# Patient Record
Sex: FEMALE | Race: BLACK OR AFRICAN AMERICAN | NOT HISPANIC OR LATINO | Employment: UNEMPLOYED | URBAN - METROPOLITAN AREA
[De-identification: names, ages, dates, MRNs, and addresses within clinical notes are randomized per-mention and may not be internally consistent; named-entity substitution may affect disease eponyms.]

---

## 2017-06-10 ENCOUNTER — HOSPITAL ENCOUNTER (EMERGENCY)
Facility: HOSPITAL | Age: 22
Discharge: HOME | End: 2017-06-10
Attending: EMERGENCY MEDICINE
Payer: MEDICAID

## 2017-06-10 VITALS
OXYGEN SATURATION: 96 % | DIASTOLIC BLOOD PRESSURE: 71 MMHG | BODY MASS INDEX: 42.74 KG/M2 | SYSTOLIC BLOOD PRESSURE: 135 MMHG | RESPIRATION RATE: 20 BRPM | HEART RATE: 101 BPM | HEIGHT: 68 IN | WEIGHT: 282 LBS | TEMPERATURE: 99 F

## 2017-06-10 DIAGNOSIS — B34.9 VIRAL ILLNESS: Primary | ICD-10-CM

## 2017-06-10 LAB
B-HCG UR QL: NEGATIVE
BILIRUB UR QL STRIP: NEGATIVE
CLARITY UR REFRACT.AUTO: CLEAR
COLOR UR AUTO: YELLOW
GLUCOSE UR QL STRIP: NEGATIVE
HGB UR QL STRIP: NEGATIVE
KETONES UR QL STRIP: NEGATIVE
LEUKOCYTE ESTERASE UR QL STRIP: NEGATIVE
NITRITE UR QL STRIP: NEGATIVE
PH UR STRIP: 6 [PH] (ref 5–8)
PROT UR QL STRIP: NEGATIVE
SP GR UR STRIP: >=1.03 (ref 1–1.03)
URN SPEC COLLECT METH UR: ABNORMAL
UROBILINOGEN UR STRIP-ACNC: NEGATIVE EU/DL

## 2017-06-10 PROCEDURE — 99283 EMERGENCY DEPT VISIT LOW MDM: CPT

## 2017-06-10 PROCEDURE — 81003 URINALYSIS AUTO W/O SCOPE: CPT

## 2017-06-10 PROCEDURE — 81025 URINE PREGNANCY TEST: CPT

## 2017-06-12 NOTE — ED PROVIDER NOTES
"Encounter Date: 6/10/2017       History     Chief Complaint   Patient presents with    Dizziness     Pt states, " Starting yesterday, I have been feeling dizzy and lightheaded with sharp pains going through my stomach and side." Pt ambulatory in triage without difficulty, AAOx4.      Review of patient's allergies indicates:   Allergen Reactions    Penicillins      Patient currently presents with complaint of generalized fatigue.  Patient notes that she was having waves of nausea and abdominal cramping that have been intermittent over the past 48 hours.  She denies fever or chills.  There has been no vomiting or diarrhea.  Patient notes that she was feeling weak yesterday but this has improved.  Patient denies excessive vaginal bleeding.  There has been no blood in the stools.  She denies urinary symptoms.          Past Medical History:   Diagnosis Date    Anxiety      Past Surgical History:   Procedure Laterality Date     SECTION       History reviewed. No pertinent family history.  Social History   Substance Use Topics    Smoking status: Never Smoker    Smokeless tobacco: Never Used    Alcohol use No     Review of Systems   Constitutional: Negative for chills and fever.   HENT: Negative for congestion and rhinorrhea.    Eyes: Negative for visual disturbance.   Respiratory: Negative for cough, chest tightness, shortness of breath and wheezing.    Cardiovascular: Negative for chest pain, palpitations and leg swelling.   Gastrointestinal: Positive for nausea. Negative for abdominal distention, abdominal pain, blood in stool, constipation, diarrhea and vomiting.   Genitourinary: Negative for difficulty urinating, dysuria, frequency, urgency, vaginal bleeding and vaginal discharge.   Skin: Negative for color change and rash.   Allergic/Immunologic: Negative for immunocompromised state.   Neurological: Negative for weakness, numbness and headaches.   Hematological: Negative for adenopathy. Does not " bruise/bleed easily.   All other systems reviewed and are negative.      Physical Exam     Initial Vitals [06/10/17 2014]   BP Pulse Resp Temp SpO2   (!) 141/90 108 20 99 °F (37.2 °C) 96 %     Physical Exam    Nursing note and vitals reviewed.  Constitutional: She appears well-developed and well-nourished. She is not diaphoretic. No distress.   Patient currently appears comfortable; smiling; laughing; browsing through her phone during the encounter.   HENT:   Head: Normocephalic and atraumatic.   Right Ear: External ear normal.   Left Ear: External ear normal.   Nose: Nose normal.   Mouth/Throat: Oropharynx is clear and moist.   Eyes: Conjunctivae and EOM are normal. Pupils are equal, round, and reactive to light. No scleral icterus.   Neck: Neck supple. No JVD present.   Cardiovascular: Normal rate, regular rhythm, normal heart sounds and intact distal pulses. Exam reveals no gallop and no friction rub.    No murmur heard.  Pulmonary/Chest: Breath sounds normal. No respiratory distress. She has no wheezes. She has no rhonchi. She has no rales.   Abdominal: Soft. Bowel sounds are normal. She exhibits no distension. There is no tenderness.   Musculoskeletal: Normal range of motion.   Lymphadenopathy:     She has no cervical adenopathy.   Neurological: She is alert and oriented to person, place, and time. No cranial nerve deficit.   Skin: Skin is warm and dry. No rash noted.   Psychiatric: She has a normal mood and affect. Her behavior is normal.         ED Course   Procedures  Labs Reviewed   URINALYSIS - Abnormal; Notable for the following:        Result Value    Specific Gravity, UA >=1.030 (*)     All other components within normal limits   PREGNANCY TEST, URINE RAPID         Based on vital signs taken here in the emergency room today, the patient was counseled regarding an elevated blood pressure concerning for pre-hypertension/hypertension.  Accordingly the patient has been advised to follow with the primary  care physician for reassessment and management as needed.  Ihsan Pritchard MD  4:51 AM       Medical Decision Making:   ED Management:  All findings were reviewed with the patient/family in detail.  At present I see no evidence of an emergency condition.  Patient appears to be recovering from possible viral illness.  All remaining questions and concerns were addressed at that time.  Patient has been counseled regarding the need for follow-up as well as the indication to return to the emergency room should new or worrisome developments occur.  Ihsan Pritchard MD  4:54 AM                     ED Course     Clinical Impression:   The encounter diagnosis was Viral illness.          Ihsan Pritchard MD  06/12/17 0454

## 2018-03-20 ENCOUNTER — HOSPITAL ENCOUNTER (EMERGENCY)
Facility: HOSPITAL | Age: 23
Discharge: HOME | End: 2018-03-20
Payer: MEDICAID

## 2018-03-20 VITALS
OXYGEN SATURATION: 100 % | DIASTOLIC BLOOD PRESSURE: 81 MMHG | TEMPERATURE: 100 F | SYSTOLIC BLOOD PRESSURE: 136 MMHG | BODY MASS INDEX: 46.22 KG/M2 | RESPIRATION RATE: 16 BRPM | HEART RATE: 93 BPM | WEIGHT: 293 LBS

## 2018-03-20 DIAGNOSIS — R53.83 FATIGUE, UNSPECIFIED TYPE: ICD-10-CM

## 2018-03-20 DIAGNOSIS — R03.0 ELEVATED BLOOD PRESSURE READING: ICD-10-CM

## 2018-03-20 DIAGNOSIS — R68.83 CHILLS: Primary | ICD-10-CM

## 2018-03-20 LAB
B-HCG UR QL: NEGATIVE
BILIRUB UR QL STRIP: NEGATIVE
CLARITY UR REFRACT.AUTO: ABNORMAL
COLOR UR AUTO: YELLOW
GLUCOSE UR QL STRIP: NEGATIVE
HGB UR QL STRIP: NEGATIVE
KETONES UR QL STRIP: NEGATIVE
LEUKOCYTE ESTERASE UR QL STRIP: NEGATIVE
NITRITE UR QL STRIP: NEGATIVE
PH UR STRIP: 6 [PH] (ref 5–8)
PROT UR QL STRIP: NEGATIVE
SP GR UR STRIP: 1.02 (ref 1–1.03)
URN SPEC COLLECT METH UR: ABNORMAL
UROBILINOGEN UR STRIP-ACNC: NEGATIVE EU/DL

## 2018-03-20 PROCEDURE — 81003 URINALYSIS AUTO W/O SCOPE: CPT

## 2018-03-20 PROCEDURE — 99283 EMERGENCY DEPT VISIT LOW MDM: CPT

## 2018-03-20 PROCEDURE — 81025 URINE PREGNANCY TEST: CPT

## 2018-03-20 RX ORDER — ONDANSETRON 4 MG/1
4 TABLET, ORALLY DISINTEGRATING ORAL EVERY 6 HOURS PRN
Qty: 10 TABLET | Refills: 0 | Status: SHIPPED | OUTPATIENT
Start: 2018-03-20 | End: 2018-03-25

## 2018-03-20 NOTE — ED PROVIDER NOTES
History      Chief Complaint   Patient presents with    Fatigue     chills and fatigue approx 1.5 weeks       Review of patient's allergies indicates:   Allergen Reactions    Penicillins         HPI   HPI    3/20/2018, 12:22 PM   History obtained from the patient      History of Present Illness: Lise Qureshi is a 22 y.o. female patient who presents to the Emergency Department for intermittent subj fever, tiredness for a week, since starting a new job with 12 hour shifts.  She denies any cough and cold symptoms, abd pain, vomiting, dysuria.  She admits to a few episodes of diarrhea and nausea. Symptoms are moderate in severity.     No further complaints or concerns at this time.           PCP: Sheron Sousa RN       Past Medical History:  Past Medical History:   Diagnosis Date    Anxiety          Past Surgical History:  Past Surgical History:   Procedure Laterality Date     SECTION             Family History:  History reviewed. No pertinent family history.        Social History:  Social History     Social History Main Topics    Smoking status: Never Smoker    Smokeless tobacco: Never Used    Alcohol use No    Drug use: No    Sexual activity: Not on file       ROS     Review of Systems   Constitutional: Positive for chills and fatigue.   HENT: Negative for rhinorrhea and sore throat.    Respiratory: Negative for cough and shortness of breath.    Cardiovascular: Negative for chest pain.   Gastrointestinal: Positive for diarrhea and nausea. Negative for abdominal pain and vomiting.   Endocrine: Negative for polydipsia and polyuria.   Genitourinary: Negative for dysuria, flank pain, pelvic pain and vaginal discharge.   Musculoskeletal: Negative for back pain, neck pain and neck stiffness.   Skin: Negative for color change and rash.   Neurological: Negative for weakness and headaches.   Hematological: Does not bruise/bleed easily.   All other systems reviewed and are  negative.      Physical Exam      Initial Vitals [03/20/18 1212]   BP Pulse Resp Temp SpO2   136/81 93 16 99.5 °F (37.5 °C) 100 %      MAP       99.33         Physical Exam  Vital signs and nursing notes reviewed.  Constitutional: Patient is in NAD. Awake and alert. Well-developed and well-nourished.  Head: Atraumatic. Normocephalic.  Eyes: PERRL. EOM intact. Conjunctivae nl. No scleral icterus.  ENT: Mucous membranes are moist. Oropharynx is clear.  Neck: Supple. No JVD. No lymphadenopathy.  No meningismus  Cardiovascular: Regular rate and rhythm. No murmurs, rubs, or gallops. Distal pulses are 2+ and symmetric.  Pulmonary/Chest: No respiratory distress. Clear to auscultation bilaterally. No wheezing, rales, or rhonchi.  Abdominal: Soft. Non-distended. No TTP. No rebound, guarding, or rigidity. Good bowel sounds.  Genitourinary: No CVA tenderness  Musculoskeletal: Moves all extremities. No edema.   Skin: Warm and dry.  Neurological: Awake and alert. No acute focal neurological deficits are appreciated.  Psychiatric: Normal affect. Good eye contact. Appropriate in content.      ED Course          Procedures  ED Vital Signs:  Vitals:    03/20/18 1212   BP: 136/81   Pulse: 93   Resp: 16   Temp: 99.5 °F (37.5 °C)   TempSrc: Oral   SpO2: 100%   Weight: (!) 137.9 kg (304 lb)         Results for orders placed or performed during the hospital encounter of 03/20/18   Urinalysis   Result Value Ref Range    Specimen UA Urine, Clean Catch     Color, UA Yellow Yellow, Straw, Katherine    Appearance, UA Hazy (A) Clear    pH, UA 6.0 5.0 - 8.0    Specific Gravity, UA 1.025 1.005 - 1.030    Protein, UA Negative Negative    Glucose, UA Negative Negative    Ketones, UA Negative Negative    Bilirubin (UA) Negative Negative    Occult Blood UA Negative Negative    Nitrite, UA Negative Negative    Urobilinogen, UA Negative <2.0 EU/dL    Leukocytes, UA Negative Negative   Pregnancy, urine rapid   Result Value Ref Range    Preg Test, Ur  Negative              Imaging Results:  Imaging Results    None            The Emergency Provider reviewed the vital signs and test results, which are outlined above.    ED Discussion             Medication(s) given in the ER:  Medications - No data to display        Follow-up Information     [facility] [place] in 2 days.    Contact information:  [id number] [facility]  [place] [id number]  [phone]                       New Prescriptions    ONDANSETRON (ZOFRAN-ODT) 4 MG TBDL    Take 1 tablet (4 mg total) by mouth every 6 (six) hours as needed (nausea/vomiting).          Medical Decision Making      Pre-hypertension/Hypertension: The pt has been informed that they may have pre-hypertension or hypertension based on a blood pressure reading in the ED. I recommend that the pt call the PCP listed on their discharge instructions or a physician of their choice this week to arrange f/u for further evaluation of possible pre-hypertension or hypertension.       All findings were reviewed with the patient/family in detail.   All remaining questions and concerns were addressed at that time.  Patient/family has been counseled regarding the need for follow-up as well as the indication to return to the emergency room should new or worrisome developments occur.        MDM   Additional MDM:   Hypertension: The patient has hypertension (no treatment required at this time). The patient's condition was felt to be stable.              Clinical Impression:        ICD-10-CM ICD-9-CM   1. Chills R68.83 780.64   2. Elevated blood pressure reading R03.0 796.2   3. Fatigue, unspecified type R53.83 780.79             Poonam Nixon PA-C  03/20/18 0205

## 2018-03-20 NOTE — ED NOTES
Level of Consciousness: The patient is awake, alert, and oriented with appropriate affect and speech; oriented to person, place and time.  Skin: Skin is warm and dry with good skin turgor; intact; color consistent with ethnicity.  Mucous membranes are moist.   Musculoskeletal: Moves all extremities well in full range of motion. No obvious deformities or swelling noted.  Respiratory: Airway open and patent, respirations spontaneous, even and unlabored. No accessory muscles in use. Breath sounds clear.  Cardiac: Regular rate and rhythm, no peripheral edema noted, good pulses palpated peripherally, capillary refill < 3 seconds.  Abdomen: Soft, non-tender to palpation. No distention noted.  Neurologic: PERRLA, face exhibits symmetrical expression, hand grasps equal and even bilaterally, reports normal sensation to all extremities and face.  Reports generalized weakness and chills. Ambulates with strong, steady gait. Denies pain, SOB, N/V/D.

## 2018-03-25 ENCOUNTER — HOSPITAL ENCOUNTER (EMERGENCY)
Facility: HOSPITAL | Age: 23
Discharge: HOME | End: 2018-03-25
Attending: EMERGENCY MEDICINE
Payer: MEDICAID

## 2018-03-25 VITALS
HEIGHT: 68 IN | BODY MASS INDEX: 44.41 KG/M2 | HEART RATE: 97 BPM | OXYGEN SATURATION: 100 % | WEIGHT: 293 LBS | DIASTOLIC BLOOD PRESSURE: 76 MMHG | TEMPERATURE: 98 F | RESPIRATION RATE: 20 BRPM | SYSTOLIC BLOOD PRESSURE: 119 MMHG

## 2018-03-25 DIAGNOSIS — L50.0 ALLERGIC URTICARIA: Primary | ICD-10-CM

## 2018-03-25 PROCEDURE — 96372 THER/PROPH/DIAG INJ SC/IM: CPT

## 2018-03-25 PROCEDURE — 63600175 PHARM REV CODE 636 W HCPCS: Performed by: EMERGENCY MEDICINE

## 2018-03-25 PROCEDURE — 99283 EMERGENCY DEPT VISIT LOW MDM: CPT | Mod: 25

## 2018-03-25 PROCEDURE — 25000003 PHARM REV CODE 250: Performed by: EMERGENCY MEDICINE

## 2018-03-25 RX ORDER — DIPHENHYDRAMINE HCL 25 MG
50 CAPSULE ORAL
Status: COMPLETED | OUTPATIENT
Start: 2018-03-25 | End: 2018-03-25

## 2018-03-25 RX ORDER — DEXAMETHASONE SODIUM PHOSPHATE 4 MG/ML
8 INJECTION, SOLUTION INTRA-ARTICULAR; INTRALESIONAL; INTRAMUSCULAR; INTRAVENOUS; SOFT TISSUE
Status: COMPLETED | OUTPATIENT
Start: 2018-03-25 | End: 2018-03-25

## 2018-03-25 RX ADMIN — DEXAMETHASONE SODIUM PHOSPHATE 8 MG: 4 INJECTION, SOLUTION INTRAMUSCULAR; INTRAVENOUS at 12:03

## 2018-03-25 RX ADMIN — DIPHENHYDRAMINE HYDROCHLORIDE 50 MG: 25 CAPSULE ORAL at 12:03

## 2018-03-25 NOTE — ED PROVIDER NOTES
Encounter Date: 3/25/2018       History     Chief Complaint   Patient presents with    Allergic Reaction     Patient reports that she ate crawfish around 5 pm and approx 30 mins qgo she noticed hives. hives noted to arms, neck and left cheek. Denies feeling short of breath.     Patient currently presents with concerns regarding rash.  Onset noted a few hours ago.  Process is generalized.  Rash is described as hives.  There is not associated fever.  Patient/family reports associated itching.  There is not a recent history of new medications or foods but the patient does note crawfish intake prior to the onset of symptoms.  This has not occurred previously.           Review of patient's allergies indicates:   Allergen Reactions    Penicillins      Past Medical History:   Diagnosis Date    Anxiety      Past Surgical History:   Procedure Laterality Date     SECTION       History reviewed. No pertinent family history.  Social History   Substance Use Topics    Smoking status: Never Smoker    Smokeless tobacco: Never Used    Alcohol use No     Review of Systems   Constitutional: Negative for chills and fever.   HENT: Negative for congestion and rhinorrhea.    Respiratory: Negative for cough, chest tightness, shortness of breath and wheezing.    Cardiovascular: Negative for chest pain, palpitations and leg swelling.   Gastrointestinal: Negative for abdominal pain, constipation, diarrhea, nausea and vomiting.   Genitourinary: Negative for dysuria, frequency, urgency, vaginal bleeding and vaginal discharge.   Skin: Negative for color change and rash.   Allergic/Immunologic: Negative for immunocompromised state.   Neurological: Negative for dizziness, weakness and numbness.   Hematological: Negative for adenopathy. Does not bruise/bleed easily.   All other systems reviewed and are negative.      Physical Exam     Initial Vitals [18 0010]   BP Pulse Resp Temp SpO2   119/76 97 20 98.3 °F (36.8 °C) 100 %       MAP       90.33             Physical Exam    Nursing note and vitals reviewed.  Constitutional: She appears well-developed and well-nourished. She is not diaphoretic. No distress.   HENT:   Head: Normocephalic and atraumatic.   Right Ear: External ear normal.   Left Ear: External ear normal.   Nose: Nose normal.   Mouth/Throat: Oropharynx is clear and moist.   Eyes: Conjunctivae and EOM are normal. Pupils are equal, round, and reactive to light. No scleral icterus.   Neck: Neck supple. No tracheal deviation present. No JVD present.   Cardiovascular: Normal rate, regular rhythm, normal heart sounds and intact distal pulses. Exam reveals no gallop and no friction rub.    No murmur heard.  Pulmonary/Chest: Breath sounds normal. No respiratory distress. She has no wheezes. She has no rhonchi. She has no rales.   Abdominal: Soft. Bowel sounds are normal. She exhibits no distension. There is no tenderness.   Musculoskeletal: Normal range of motion. She exhibits no edema.   Neurological: She is alert and oriented to person, place, and time. She has normal strength. No cranial nerve deficit or sensory deficit.   Skin: Skin is warm and dry. Rash noted. Rash is urticarial.   Psychiatric: She has a normal mood and affect. Her behavior is normal.         ED Course   Procedures  Labs Reviewed - No data to display          Medical Decision Making:   ED Management:  All findings were reviewed with the patient/family in detail along with the diagnosis of allergic urticaria.  I see no indication of an emergent process beyond that addressed during our encounter but have duly counseled the patient/family regarding the need for prompt follow-up as well as the indications that should prompt immediate return to the emergency room should new or worrisome developments occur.  The patient/family communicates understanding of all this information and all remaining questions and concerns were addressed at this time.                           Clinical Impression:   The encounter diagnosis was Allergic urticaria.                           Ihsan Pritchard MD  03/25/18 0032

## 2018-07-13 ENCOUNTER — HOSPITAL ENCOUNTER (EMERGENCY)
Facility: HOSPITAL | Age: 23
Discharge: HOME | End: 2018-07-13
Attending: EMERGENCY MEDICINE
Payer: MEDICAID

## 2018-07-13 VITALS
DIASTOLIC BLOOD PRESSURE: 86 MMHG | TEMPERATURE: 99 F | RESPIRATION RATE: 18 BRPM | SYSTOLIC BLOOD PRESSURE: 138 MMHG | HEART RATE: 95 BPM | BODY MASS INDEX: 44.41 KG/M2 | WEIGHT: 293 LBS | OXYGEN SATURATION: 100 % | HEIGHT: 68 IN
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
## 2018-07-13 DIAGNOSIS — R03.0 ELEVATED BLOOD PRESSURE READING WITHOUT DIAGNOSIS OF HYPERTENSION: ICD-10-CM

## 2018-07-13 DIAGNOSIS — S09.90XA INJURY OF HEAD, INITIAL ENCOUNTER: ICD-10-CM

## 2018-07-13 DIAGNOSIS — S00.03XA CONTUSION OF SCALP, INITIAL ENCOUNTER: ICD-10-CM

## 2018-07-13 LAB — B-HCG UR QL: NEGATIVE

## 2018-07-13 PROCEDURE — 99284 EMERGENCY DEPT VISIT MOD MDM: CPT | Mod: 25

## 2018-07-13 PROCEDURE — 81025 URINE PREGNANCY TEST: CPT

## 2018-07-13 NOTE — ED PROVIDER NOTES
Encounter Date: 2018       History     Chief Complaint   Patient presents with    Fall     fell and hit head approx 30 min PTA, +nausea and dizziness, denies LOC, pt reports tripped and hit right head near temple      She was walking at home when she tripped over a chair in the kitchen and fell forward, striking the left forehead against the corner of an iron stove as she fell, ultimately landing on the floor on her left shoulder.  No loss of consciousness or laceration.  Light is bothering her eyes and she has minimal nausea.  No neck injury, back pain, or other significant localizing complaints. Denies change in level of consciousness, amnesia, or other localizing neurologic complaint.  Does not believe that she would be pregnant currently.  Did not drive here.  Concerned about the possibility of intracranial injury. No other complaints.      The history is provided by the patient. No  was used.     Review of patient's allergies indicates:   Allergen Reactions    Penicillins      Past Medical History:   Diagnosis Date    Anxiety      Past Surgical History:   Procedure Laterality Date     SECTION       History reviewed. No pertinent family history.  Social History   Substance Use Topics    Smoking status: Current Some Day Smoker    Smokeless tobacco: Never Used    Alcohol use No     Review of Systems   Constitutional: Negative for activity change, fatigue and fever.   HENT: Negative for congestion, ear pain, facial swelling, nosebleeds, sinus pressure and sore throat.    Eyes: Negative for pain, discharge, redness and visual disturbance.   Respiratory: Negative for cough, choking, chest tightness, shortness of breath and wheezing.    Cardiovascular: Negative for chest pain, palpitations and leg swelling.   Gastrointestinal: Negative for abdominal distention, abdominal pain, nausea and vomiting.   Endocrine: Negative for heat intolerance, polydipsia and polyuria.    Genitourinary: Negative for difficulty urinating, dysuria, flank pain, hematuria and urgency.   Musculoskeletal: Negative for back pain, gait problem, joint swelling and myalgias.   Skin: Negative for color change and rash.   Allergic/Immunologic: Negative for environmental allergies and food allergies.   Neurological: Positive for headaches. Negative for dizziness, weakness and numbness.   Hematological: Negative for adenopathy. Does not bruise/bleed easily.   Psychiatric/Behavioral: Negative for agitation and behavioral problems. The patient is not nervous/anxious.    All other systems reviewed and are negative.      Physical Exam     Initial Vitals [07/13/18 1724]   BP Pulse Resp Temp SpO2   (!) 170/98 (!) 122 20 98.6 °F (37 °C) 99 %      MAP       --         Physical Exam    Nursing note and vitals reviewed.  Constitutional: She appears well-developed and well-nourished. She is not diaphoretic. No distress.   HENT:   Head: Normocephalic.   Mouth/Throat: No oropharyngeal exudate.   2.5 cm circular raised tender swelling over the left frontal forehead region without crepitus or laceration.  No other findings.   Eyes: Conjunctivae and EOM are normal. Pupils are equal, round, and reactive to light. Right eye exhibits no discharge. Left eye exhibits no discharge. No scleral icterus.   Neck: Normal range of motion. Neck supple. No thyromegaly present. No tracheal deviation present. No JVD present.   Cardiovascular: Normal rate, regular rhythm, normal heart sounds and intact distal pulses. Exam reveals no gallop and no friction rub.    No murmur heard.  Pulmonary/Chest: Breath sounds normal. No stridor. No respiratory distress. She has no wheezes. She has no rhonchi. She has no rales. She exhibits no tenderness.   Abdominal: Soft. Bowel sounds are normal. She exhibits no distension and no mass. There is no tenderness. There is no rebound and no guarding.   Musculoskeletal: Normal range of motion. She exhibits no  edema or tenderness.   Neurological: She is alert and oriented to person, place, and time. She has normal strength.   Normal exam   Skin: Skin is warm and dry. No rash and no abscess noted. No erythema.   Psychiatric: She has a normal mood and affect. Her behavior is normal. Judgment and thought content normal.         ED Course   Procedures  Labs Reviewed   PREGNANCY TEST, URINE RAPID          Imaging Results          CT Head Without Contrast (Final result)  Result time 07/13/18 18:32:09    Final result by Margarito Torres Jr., MD (07/13/18 18:32:09)                 Impression:      1. No acute intracranial findings.  All CT scans at this facility are performed  using dose modulation techniques as appropriate to performed exam including the following:  automated exposure control; adjustment of mA and/or kV according to the patients size (this includes techniques or standardized protocols for targeted exams where dose is matched to indication/reason for exam: i.e. extremities or head);  iterative reconstruction technique.      Electronically signed by: Margarito Torres Jr., MD  Date:    07/13/2018  Time:    18:32             Narrative:    EXAMINATION:  CT HEAD WITHOUT CONTRAST    CLINICAL HISTORY:  Head trauma, minor, GCS>=13, NOC/NEXUS/CCR neg, first study;    TECHNIQUE:  CT scan was obtained of the head without administration of contrast.    COMPARISON:  None    FINDINGS:  Ventricles and basal cisterns are normal.  No hemorrhage, mass effect or midline shift.  No cerebral or cerebellar parenchymal abnormality.  Paranasal sinuses are clear.  Mastoid air cells are clear.  Calvarium is intact.                                                      Clinical Impression:     1. Fall from standing, initial encounter    2. Injury of head, initial encounter    3. Contusion of scalp, initial encounter    4. Elevated blood pressure reading without diagnosis of hypertension            Disposition:   Condition:  Stable                        Eyal Hinkle MD  07/17/18 6842

## 2018-09-06 ENCOUNTER — HOSPITAL ENCOUNTER (EMERGENCY)
Facility: HOSPITAL | Age: 23
Discharge: HOME | End: 2018-09-06
Attending: EMERGENCY MEDICINE
Payer: MEDICAID

## 2018-09-06 VITALS
HEIGHT: 68 IN | SYSTOLIC BLOOD PRESSURE: 148 MMHG | BODY MASS INDEX: 43.65 KG/M2 | TEMPERATURE: 98 F | OXYGEN SATURATION: 99 % | HEART RATE: 100 BPM | DIASTOLIC BLOOD PRESSURE: 87 MMHG | WEIGHT: 288 LBS | RESPIRATION RATE: 20 BRPM

## 2018-09-06 DIAGNOSIS — R10.2 PELVIC CRAMPING: Primary | ICD-10-CM

## 2018-09-06 LAB — B-HCG UR QL: NEGATIVE

## 2018-09-06 PROCEDURE — 81025 URINE PREGNANCY TEST: CPT

## 2018-09-06 PROCEDURE — 25000003 PHARM REV CODE 250: Performed by: NURSE PRACTITIONER

## 2018-09-06 PROCEDURE — 99283 EMERGENCY DEPT VISIT LOW MDM: CPT

## 2018-09-06 RX ORDER — IBUPROFEN 600 MG/1
600 TABLET ORAL
Status: COMPLETED | OUTPATIENT
Start: 2018-09-06 | End: 2018-09-06

## 2018-09-06 RX ORDER — IBUPROFEN 600 MG/1
600 TABLET ORAL EVERY 8 HOURS PRN
Qty: 20 TABLET | Refills: 0 | Status: SHIPPED | OUTPATIENT
Start: 2018-09-06 | End: 2018-12-27

## 2018-09-06 RX ADMIN — IBUPROFEN 600 MG: 600 TABLET ORAL at 01:09

## 2018-09-06 NOTE — ED PROVIDER NOTES
Encounter Date: 2018       History     Chief Complaint   Patient presents with    Dysmenorrhea     c/o pelvic pain and nausea for the last 3 days following the start of her cycle      22 year old female with complaint of lower pelvic cramping X 3 days.  Pt reports that is started her menses 3 days ago.  Reports light bleeding at present.  Reports no pain radiation.  NO trial of over the counter pain medication.  Reports mild nausea.  NO alleviating factors.           Review of patient's allergies indicates:   Allergen Reactions    Penicillins      Past Medical History:   Diagnosis Date    Anxiety      Past Surgical History:   Procedure Laterality Date     SECTION       History reviewed. No pertinent family history.  Social History     Tobacco Use    Smoking status: Current Some Day Smoker    Smokeless tobacco: Never Used   Substance Use Topics    Alcohol use: No    Drug use: No     Review of Systems   Constitutional: Negative for fever.   HENT: Negative for sore throat.    Respiratory: Negative for shortness of breath.    Cardiovascular: Negative for chest pain.   Gastrointestinal: Negative for nausea.   Genitourinary: Positive for pelvic pain. Negative for dysuria.   Musculoskeletal: Negative for back pain.   Skin: Negative for rash.   Neurological: Negative for weakness.   Hematological: Does not bruise/bleed easily.       Physical Exam     Initial Vitals [18 1315]   BP Pulse Resp Temp SpO2   (!) 148/87 100 20 98.2 °F (36.8 °C) 99 %      MAP       --         Physical Exam    Nursing note and vitals reviewed.  Constitutional: She appears well-developed and well-nourished.   HENT:   Head: Normocephalic and atraumatic.   Eyes: Conjunctivae and EOM are normal. Pupils are equal, round, and reactive to light.   Neck: Normal range of motion. Neck supple.   Cardiovascular: Normal rate, regular rhythm, normal heart sounds and intact distal pulses.   Pulmonary/Chest: Breath sounds normal.    Abdominal: Soft. There is no tenderness. There is no rebound and no guarding.   Musculoskeletal: Normal range of motion.   Neurological: She is alert and oriented to person, place, and time. She has normal strength and normal reflexes.   Skin: Skin is warm and dry.   Psychiatric: She has a normal mood and affect. Her behavior is normal. Thought content normal.         ED Course   Procedures  Labs Reviewed   PREGNANCY TEST, URINE RAPID          Imaging Results    None           Labs Reviewed   PREGNANCY TEST, URINE RAPID     3:05 PM  Pt reports feeling better                       Clinical Impression:   The encounter diagnosis was Pelvic cramping.                             Jefferson Castro NP  09/06/18 [id number]

## 2018-12-27 ENCOUNTER — HOSPITAL ENCOUNTER (EMERGENCY)
Facility: HOSPITAL | Age: 23
Discharge: HOME | End: 2018-12-27
Attending: EMERGENCY MEDICINE
Payer: MEDICAID

## 2018-12-27 VITALS
TEMPERATURE: 99 F | OXYGEN SATURATION: 99 % | SYSTOLIC BLOOD PRESSURE: 168 MMHG | WEIGHT: 293 LBS | RESPIRATION RATE: 18 BRPM | DIASTOLIC BLOOD PRESSURE: 90 MMHG | HEART RATE: 96 BPM | BODY MASS INDEX: 45.59 KG/M2

## 2018-12-27 DIAGNOSIS — M25.572 LEFT ANKLE PAIN: ICD-10-CM

## 2018-12-27 DIAGNOSIS — S93.402A MILD SPRAIN OF LEFT ANKLE, INITIAL ENCOUNTER: Primary | ICD-10-CM

## 2018-12-27 DIAGNOSIS — R03.0 ELEVATED BLOOD PRESSURE READING WITHOUT DIAGNOSIS OF HYPERTENSION: ICD-10-CM

## 2018-12-27 PROCEDURE — 25000003 PHARM REV CODE 250: Performed by: NURSE PRACTITIONER

## 2018-12-27 PROCEDURE — 99283 EMERGENCY DEPT VISIT LOW MDM: CPT

## 2018-12-27 RX ORDER — NAPROXEN 500 MG/1
500 TABLET ORAL 2 TIMES DAILY PRN
Qty: 30 TABLET | Refills: 0 | Status: SHIPPED | OUTPATIENT
Start: 2018-12-27 | End: 2020-03-01

## 2018-12-27 RX ORDER — ACETAMINOPHEN 500 MG
1000 TABLET ORAL
Status: COMPLETED | OUTPATIENT
Start: 2018-12-27 | End: 2018-12-27

## 2018-12-27 RX ADMIN — ACETAMINOPHEN 1000 MG: 500 TABLET ORAL at 03:12

## 2018-12-27 NOTE — ED PROVIDER NOTES
Encounter Date: 2018       History     Chief Complaint   Patient presents with    Ankle Pain     twisted left ankle about an hour ago     The history is provided by the patient.   Leg Pain    The incident occurred in the yard. The injury mechanism was torsion (twisted her left ankle going outdoors in the rain to close the windows of her vehicle). The incident occurred 2 to 3 hours ago. The pain is present in the left ankle. The pain is at a severity of 10/10. The pain has been constant since onset. Pertinent negatives include no numbness, no inability to bear weight, no loss of motion, no muscle weakness, no loss of sensation and no tingling. The symptoms are aggravated by bearing weight, activity and palpation. She has tried NSAIDs and ice (ibuprofen) for the symptoms. The treatment provided no relief.   This is the patient's SIXTH visit to the emergency department in 2018.       PCP:    Primary Doctor No        Review of patient's allergies indicates:   Allergen Reactions    Penicillins Hives     Past Medical History:   Diagnosis Date    Acanthosis nigricans 3/27/2014    Anxiety     Obesity      Past Surgical History:   Procedure Laterality Date     SECTION       History reviewed. No pertinent family history.  Social History     Tobacco Use    Smoking status: Current Some Day Smoker    Smokeless tobacco: Never Used   Substance Use Topics    Alcohol use: No    Drug use: No     Review of Systems   Constitutional: Negative for chills and fever.   HENT: Negative for congestion and sore throat.    Eyes: Negative for visual disturbance.   Respiratory: Negative for cough, chest tightness, shortness of breath and wheezing.    Cardiovascular: Negative for chest pain and palpitations.   Gastrointestinal: Negative for abdominal pain, diarrhea, nausea and vomiting.   Genitourinary: Negative for dysuria.   Musculoskeletal: Negative for back pain and neck pain.        Positive for left ankle pain.     Skin: Negative for rash.   Neurological: Negative for dizziness, tingling, weakness, numbness and headaches.   Hematological: Does not bruise/bleed easily.       Physical Exam     Initial Vitals [12/27/18 1413]   BP Pulse Resp Temp SpO2   (!) 149/101 109 18 99.4 °F (37.4 °C) 98 %      MAP       --         Physical Exam    Nursing note and vitals reviewed.  Constitutional: She appears well-developed and well-nourished. She is Obese . She is cooperative. She does not appear ill. No distress.   HENT:   Head: Normocephalic and atraumatic.   Nose: Nose normal.   Mouth/Throat: Uvula is midline, oropharynx is clear and moist and mucous membranes are normal.   Eyes: Conjunctivae, EOM and lids are normal. Pupils are equal, round, and reactive to light.   Neck: Trachea normal and normal range of motion. Neck supple.   Cardiovascular: Normal rate, regular rhythm, intact distal pulses and normal pulses.   Pulmonary/Chest: Effort normal. No respiratory distress.   Musculoskeletal: Normal range of motion. She exhibits no edema.        Left ankle: She exhibits swelling (mild swelling noted just below lateral malleolus). She exhibits normal range of motion, no ecchymosis, no deformity and normal pulse. Tenderness. Lateral malleolus tenderness found.        Feet:    Neurological: She is alert and oriented to person, place, and time. She has normal strength. No sensory deficit. GCS eye subscore is 4. GCS verbal subscore is 5. GCS motor subscore is 6.   Neurovascular intact to all extremities.    Skin: Skin is warm and dry. Capillary refill takes less than 2 seconds. Abrasion (small abrasion noted to tip of the left third toe - no bleeding or foreign body noted - abrasion measures less than 0.5 cm) noted. No rash noted.   Psychiatric: She has a normal mood and affect. Her speech is normal and behavior is normal. Cognition and memory are normal.         ED Course   Splint Application  Date/Time: 12/27/2018 3:30 PM  Performed by:  "Greg Carter NP  Authorized by: Greg Carter NP   Consent Done: Yes  Consent: Verbal consent obtained.  Risks and benefits: risks, benefits and alternatives were discussed  Consent given by: patient  Patient understanding: patient states understanding of the procedure being performed  Site marked: the operative site was marked  Imaging studies: imaging studies available  Patient identity confirmed: , MRN, name and verbally with patient  Time out: Immediately prior to procedure a "time out" was called to verify the correct patient, procedure, equipment, support staff and site/side marked as required.  Location details: left ankle  Splint type: ace bandage.  Supplies used: elastic bandage  Post-procedure: The splinted body part was neurovascularly unchanged following the procedure.  Patient tolerance: Patient tolerated the procedure well with no immediate complications             ED Imaging Results:   Imaging Results          X-Ray Ankle Complete Left (Final result)  Result time 18 15:06:55    Final result by Endy Jackson MD (18 15:06:55)                 Impression:      No acute fracture or dislocation.      Electronically signed by: Endy Jackson MD  Date:    2018  Time:    15:06             Narrative:    EXAMINATION:  XR ANKLE COMPLETE 3 VIEW LEFT    CLINICAL HISTORY:  XR ANKLE COMPLETE 3 VIEW LEFTPain in left ankle and joints of left foot    COMPARISON:  None    FINDINGS:  Three views of the left ankle were obtained.    No evidence of acute fracture or dislocation.  Bony mineralization is normal.  Soft tissues are unremarkable.                                ED Medications:   Medications   acetaminophen tablet 1,000 mg (1,000 mg Oral Given 18 1539)       ED Course Vitals  Vitals:    18 1413 18 1440   BP: (!) 149/101 (!) 168/90   BP Location: Left arm Right arm   Patient Position: Sitting Sitting   Pulse: 109 96   Resp: 18    Temp: 99.4 °F (37.4 °C)  "   TempSrc: Oral    SpO2: 98% 99%   Weight: 136 kg (299 lb 13.2 oz)          1530 HOURS RE-EVALUATION & DISPOSITION:   Reassessment at the time of disposition demonstrates that the patient is resting comfortably in no acute distress.  She has remained hemodynamically stable throughout the entire ED visit and is without objective evidence for acute process requiring urgent intervention or hospitalization. I discussed test results and provided counseling to patient with regard to condition, the treatment plan, specific conditions for return, and the importance of follow up.  Answered questions at this time. The patient is stable for discharge.       X-Rays:   Independently Interpreted Readings:   Other Readings:  Radiographs of the left ankle reveal no acute findings.     Medical Decision Making:   History:   Old Records Summarized: records from clinic visits.  Clinical Tests:   Radiological Study: Ordered and Reviewed                      Clinical Impression:       ICD-10-CM ICD-9-CM   1. Mild sprain of left ankle, initial encounter S93.402A 845.00   2. Left ankle pain M25.572 719.47   3. Elevated blood pressure reading without diagnosis of hypertension R03.0 796.2           Disposition:   Disposition: Discharged  Condition: Stable  I discussed with patient that the evaluation in the emergency department does not suggest any emergent or life threatening medical condition requiring immediate intervention beyond what was provided in the ED, and I believe patient is safe for discharge.  Regardless, an unremarkable evaluation in the ED does not preclude the development or presence of a serious of life threatening condition. As such, patient was instructed to return immediately for any worsening or change in current symptoms. I also discussed the results of my evaluation and diagnosis with patient and she concurs with the evaluation and management plan.  Detailed written and verbal instructions provided to patient and she  expressed a verbal understanding of the discharge instructions and overall management plan. Reiterated the importance of medication administration and safety and advised patient to follow up with primary care provider in 3-5 days or sooner if needed.  Also advised patient to return to the ER for any complications.     Regarding ANKLE PAIN, for treatment, patient instructed to: rest ankle for several days without applying weight on ankle; use an ACE bandage or ankle brace; consider using crutches or a cane to help take the weight off a sore or unsteady ankle; keep foot/ankle elevated; apply ice to area immediately and for 10-15 minutes every hour for the first day, then, apply ice every 3-4 hours for 2 more days; and try over-the-counter Tylenol or Ibuprofen for pain and swelling. Patient advised to notify primary care provider or return to ED if swelling does not decrease within 2-3 days or notice signs or symptoms of infection (redness, increased pain, fever, and warmth around ankle); or if they notice a popping sound and have immediate trouble using or moving ankle. For prevention, patient advised to obtain/maintain healthy weight; warm up before exercising; avoid sports and activities in which proper conditioning has not been achieved; ensure that shoes fit properly; use ankle support braces, work on balance, and do agility exercises.    Regarding ELEVATED BLOOD PRESSURE WITHOUT DIAGNOSIS OF HYPERTENSION/PRE-HYPERTENSION, I advised patient to: keep a record of blood pressure results; avoid medications that contain heart stimulants, including over-the-counter drugs such as decongestants; maintain a healthy weight; cut back on sodium intake (i.e., limit canned, dried, packaged, and fast foods and dont add salt to food); follow the DASH (Dietary Approaches to Stop Hypertension) eating plan which recommends vegetables, fruits, whole gains, and other heart healthy foods; begin an exercise program that includes   aerobic exercise 3 to 4 times a week for an average of 40 minutes at a time (with approval of cardiologist or primary care provider); limit drinks that contain alcohol and caffeine; control levels of emotional stress; and seek emergency care for any shortness of breath, chest pain, difficulty speaking, confusion, or visual changes.  I also recommended following up with the primary care provider for re-evaluation of blood pressure and determine if further treatment may be required.           Follow-up Information     Schedule an appointment as soon as possible for a visit  with [facility] [place].    Why:  To obtain a primary care provider  Contact information:  [address] [facility]  [place] [address] [phone]             Go to  [facility].    Why:  As needed  Contact information:  [address] [facility] [address]                   Discharge Medication List as of 12/27/2018  3:32 PM      START taking these medications    Details   naproxen (NAPROSYN) 500 MG tablet Take 1 tablet (500 mg total) by mouth 2 (two) times daily as needed (Pain)., Starting Thu 12/27/2018, Normal                          Greg Carter, CHRIS  12/27/18 5558

## 2018-12-27 NOTE — DISCHARGE INSTRUCTIONS
Your prescription has been sent electronically to [facility].     RIGHT CARE - RIGHT PLACE - RIGHT TIME! Save the emergency room for EMERGENCIES! Utilize your primary care provider or urgent care center for non-emergent conditions including those in which you were just treated for.     Your primary care provider is who you should call to schedule checkups and other non-urgent medical appointments. Remember that your primary care provider knows you, your medical history and what medications you are on, providing continuity of care for you and your family.  If you do not have a primary care provider, please refer to the information below on how to obtain a primary care provider.     If it's an emergency, then you should utilize the Emergency Department (ED). An emergency is when a condition arises that you deem severe, oftentimes a life or death situation. Good examples are heart attack symptoms, stroke or a compound fracture - a bone break that protrudes through the skin. The ED is set up with the resources needed to effectively diagnose and treat life or death situations.

## 2019-05-04 ENCOUNTER — HOSPITAL ENCOUNTER (EMERGENCY)
Facility: HOSPITAL | Age: 24
Discharge: HOME | End: 2019-05-04
Attending: EMERGENCY MEDICINE
Payer: MEDICAID

## 2019-05-04 VITALS
HEIGHT: 68 IN | RESPIRATION RATE: 20 BRPM | BODY MASS INDEX: 43.64 KG/M2 | TEMPERATURE: 99 F | WEIGHT: 287.94 LBS | HEART RATE: 86 BPM | SYSTOLIC BLOOD PRESSURE: 147 MMHG | OXYGEN SATURATION: 99 % | DIASTOLIC BLOOD PRESSURE: 79 MMHG

## 2019-05-04 DIAGNOSIS — N89.8 VAGINAL DISCHARGE: Primary | ICD-10-CM

## 2019-05-04 DIAGNOSIS — R03.0 ELEVATED BLOOD PRESSURE READING: ICD-10-CM

## 2019-05-04 DIAGNOSIS — Z72.0 TOBACCO ABUSE: ICD-10-CM

## 2019-05-04 LAB
B-HCG UR QL: POSITIVE
BACTERIA #/AREA URNS AUTO: NORMAL /HPF
BACTERIA GENITAL QL WET PREP: ABNORMAL
BILIRUB UR QL STRIP: NEGATIVE
CLARITY UR REFRACT.AUTO: CLEAR
CLUE CELLS VAG QL WET PREP: ABNORMAL
COLOR UR AUTO: YELLOW
FILAMENT FUNGI VAG WET PREP-#/AREA: ABNORMAL
GLUCOSE UR QL STRIP: NEGATIVE
HGB UR QL STRIP: NEGATIVE
KETONES UR QL STRIP: NEGATIVE
LEUKOCYTE ESTERASE UR QL STRIP: ABNORMAL
MICROSCOPIC COMMENT: NORMAL
NITRITE UR QL STRIP: NEGATIVE
PH UR STRIP: 8 [PH] (ref 5–8)
PROT UR QL STRIP: NEGATIVE
RBC #/AREA URNS AUTO: 3 /HPF (ref 0–4)
SP GR UR STRIP: 1.01 (ref 1–1.03)
SPECIMEN SOURCE: ABNORMAL
SQUAMOUS #/AREA URNS AUTO: 8 /HPF
T VAGINALIS GENITAL QL WET PREP: ABNORMAL
URN SPEC COLLECT METH UR: ABNORMAL
UROBILINOGEN UR STRIP-ACNC: <2 EU/DL
WBC #/AREA URNS AUTO: 4 /HPF (ref 0–5)
WBC #/AREA VAG WET PREP: ABNORMAL
YEAST GENITAL QL WET PREP: ABNORMAL

## 2019-05-04 PROCEDURE — 99284 EMERGENCY DEPT VISIT MOD MDM: CPT | Mod: ER

## 2019-05-04 PROCEDURE — 81000 URINALYSIS NONAUTO W/SCOPE: CPT | Mod: ER

## 2019-05-04 PROCEDURE — 25000003 PHARM REV CODE 250: Mod: ER | Performed by: EMERGENCY MEDICINE

## 2019-05-04 PROCEDURE — 87491 CHLMYD TRACH DNA AMP PROBE: CPT

## 2019-05-04 PROCEDURE — 87210 SMEAR WET MOUNT SALINE/INK: CPT | Mod: ER

## 2019-05-04 PROCEDURE — 81025 URINE PREGNANCY TEST: CPT | Mod: ER

## 2019-05-04 RX ORDER — DOXYCYCLINE 100 MG/1
100 CAPSULE ORAL 2 TIMES DAILY
COMMUNITY
End: 2020-03-01

## 2019-05-04 RX ORDER — SERTRALINE HYDROCHLORIDE 25 MG/1
25 TABLET, FILM COATED ORAL DAILY
COMMUNITY
End: 2020-03-01

## 2019-05-04 RX ORDER — FLUCONAZOLE 50 MG/1
150 TABLET ORAL ONCE
Status: COMPLETED | OUTPATIENT
Start: 2019-05-04 | End: 2019-05-04

## 2019-05-04 RX ORDER — FLUCONAZOLE 200 MG/1
200 TABLET ORAL ONCE
Qty: 1 TABLET | Refills: 0 | Status: SHIPPED | OUTPATIENT
Start: 2019-05-04 | End: 2019-05-04 | Stop reason: CLARIF

## 2019-05-04 RX ADMIN — FLUCONAZOLE 150 MG: 50 TABLET ORAL at 07:05

## 2019-05-05 LAB
C TRACH DNA SPEC QL NAA+PROBE: NOT DETECTED
N GONORRHOEA DNA SPEC QL NAA+PROBE: NOT DETECTED

## 2019-05-05 NOTE — ED PROVIDER NOTES
History     Chief Complaint   Patient presents with    Vaginal Irritation     Patient had a DNC, needed blood now she reports vaginal irritation and thinks she might have an STD and wants to get checked. Denies vaginal discharge but reports irritated and rash.        Review of patient's allergies indicates:   Allergen Reactions    Penicillins Hives       History of Present Illness   HPI    2019, 7:15 PM   The history is provided by the patient    Lise Qureshi is a 23 y.o. female presenting to the ED for vaginal discharge. Patient notes that she started having vaginal irritation approximately 5 days ago.  Patient's past medical history is significant for a D&C performed on or about 2019 at [facility] for spontaneous miscarriage.  Patient then returned back to the treatment center at [facility] for fever, lightheadedness, and dizziness.  She spent 3 days in the hospital and was discharged approximately 1 week ago.  She did receive a blood transfusion.  Currently patient notes that she is having vaginal irritation that started approximately 5 days ago.  She notes a white nonodorous discharge. Patient denies any body aches, chills, shortness of breath, abdominal pain, difficulty breathing, lightheadedness, dizziness, odorous discharge, vaginal bleeding, chest pain or chest pressure.  Nothing makes it better, nothing makes it worse.  Patient currently on doxycycline.      Arrival mode:  Personal Vehicle    PCP: Primary Doctor No     Allergies:  Review of patient's allergies indicates:   Allergen Reactions    Penicillins Hives       Past Medical History:  Past Medical History:   Diagnosis Date    Acanthosis nigricans 3/27/2014    Anxiety     Obesity        Past Surgical History:  Past Surgical History:   Procedure Laterality Date     SECTION      DILATION AND CURETTAGE OF UTERUS           Family History:  History reviewed. No pertinent family history.    Social  History:  Social History     Tobacco Use    Smoking status: Current Some Day Smoker    Smokeless tobacco: Never Used   Substance and Sexual Activity    Alcohol use: No    Drug use: No    Sexual activity: Never        Review of Systems   Review of Systems   Constitutional: Negative for diaphoresis, fatigue and fever.   HENT: Negative for sore throat.    Respiratory: Negative for shortness of breath.    Cardiovascular: Negative for chest pain.   Gastrointestinal: Negative for abdominal distention, abdominal pain, nausea and vomiting.   Genitourinary: Positive for vaginal discharge. Negative for decreased urine volume, difficulty urinating, dysuria, frequency, urgency, vaginal bleeding and vaginal pain.   Musculoskeletal: Negative for back pain.   Skin: Negative for rash.   Neurological: Negative for weakness and light-headedness.   Hematological: Does not bruise/bleed easily.        Physical Exam     Initial Vitals [19 1906]   BP Pulse Resp Temp SpO2   (!) 147/79 86 20 99.1 °F (37.3 °C) 99 %      MAP       --          Physical Exam    Nursing Notes and Vital Signs Reviewed.  Constitutional: Patient is in no apparent distress. Well-developed and well-nourished.  Obese.  Head: Atraumatic. Normocephalic.  Eyes: PERRL. EOM intact. Conjunctivae are not pale. No scleral icterus.  ENT: Mucous membranes are moist. Oropharynx is clear and symmetric.    Neck: Supple. Full ROM. No lymphadenopathy.  Cardiovascular: Regular rate. Regular rhythm. No murmurs, rubs, or gallops. Distal pulses are 2+ and symmetric.  Pulmonary/Chest: No respiratory distress. Clear to auscultation bilaterally. No wheezing or rales.  Abdominal: Soft and non-distended.  There is no tenderness.  No rebound, guarding, or rigidity. Good bowel sounds.  scar.  Genitourinary: No CVA tenderness  Musculoskeletal: Moves all extremities. No obvious deformities. No edema. No calf tenderness.  Skin: Warm and dry. Ingrown hair on  "mons.  Neurological:  Alert, awake, and appropriate.  Normal speech.  No acute focal neurological deficits are appreciated.  Psychiatric: Normal affect. Good eye contact. Appropriate in content.    7:33 PM Chaperoned Pelvic Exam (Dorie Ray): Ingrown hairs on mons,  White, non-odorous discharge at introitus.  Cervix closed.  No bleeding noted. Uterus nontender to palpation. No adnexal tenderness.     ED Course     Procedures    ED Vital Signs:  Vitals:    05/04/19 1906   BP: (!) 147/79   Pulse: 86   Resp: 20   Temp: 99.1 °F (37.3 °C)   TempSrc: Oral   SpO2: 99%   Weight: 130.6 kg (287 lb 14.7 oz)   Height: 5' 8" (1.727 m)       Abnormal Lab Results:  Labs Reviewed   URINALYSIS, REFLEX TO URINE CULTURE - Abnormal; Notable for the following components:       Result Value    Leukocytes, UA 1+ (*)     All other components within normal limits    Narrative:     Preferred Collection Type->Urine, Clean Catch   PREGNANCY TEST, URINE RAPID - Abnormal; Notable for the following components:    Preg Test, Ur Positive (*)     All other components within normal limits   VAGINAL SCREEN - Abnormal; Notable for the following components:    Budding Yeast Rare (*)     WBC - Vaginal Screen Rare (*)     Bacteria - Vaginal Screen Rare (*)     All other components within normal limits   C. TRACHOMATIS/N. GONORRHOEAE BY AMP DNA   URINALYSIS MICROSCOPIC    Narrative:     Preferred Collection Type->Urine, Clean Catch        All Lab Results:  Results for orders placed or performed during the hospital encounter of 05/04/19   Urinalysis, Reflex to Urine Culture Urine, Clean Catch   Result Value Ref Range    Specimen UA Urine, Clean Catch     Color, UA Yellow Yellow, Straw, Katherine    Appearance, UA Clear Clear    pH, UA 8.0 5.0 - 8.0    Specific Gravity, UA 1.015 1.005 - 1.030    Protein, UA Negative Negative    Glucose, UA Negative Negative    Ketones, UA Negative Negative    Bilirubin (UA) Negative Negative    Occult Blood UA Negative " Negative    Nitrite, UA Negative Negative    Urobilinogen, UA <2.0 <2.0 EU/dL    Leukocytes, UA 1+ (A) Negative   Pregnancy, urine rapid   Result Value Ref Range    Preg Test, Ur Positive (A)    Vaginal Screen   Result Value Ref Range    Trichomonas None None    Clue Cells None None    Budding Yeast Rare (A) None    Fungal Hyphae None None    WBC - Vaginal Screen Rare (A) None    Bacteria - Vaginal Screen Rare (A) None    Wet Prep Source VAG None   Urinalysis Microscopic   Result Value Ref Range    RBC, UA 3 0 - 4 /hpf    WBC, UA 4 0 - 5 /hpf    Bacteria Rare None-Occ /hpf    Squam Epithel, UA 8 /hpf    Microscopic Comment SEE COMMENT              Imaging Results:  Imaging Results    None          The Emergency Provider reviewed the vital signs and test results, which are outlined above.     ED Discussion     .7:47 PM  Reassessment: Dr. Yo reassessed the pt.  The pt is resting comfortably and is NAD.  Pt states their sx have improved. Discussed test results, shared treatment plan, specific conditions for return, and the need for f/u.  Answered their questions at this time.  Pt understands and agrees to the plan.  The pt has remained hemodynamically stable through ED course and is stable for discharge.    Note pregnancy test is positive-patient did have a D&C performed approximately 8 days ago.   Believe that this positive pregnancy test is a remnant from the miscarriage.    I discussed with patient and/or family/caretaker that evaluation in the ED does not suggest any emergent or life threatening medical conditions requiring immediate intervention beyond what was provided in the ED, and I believe patient is safe for discharge.  Regardless, an unremarkable evaluation in the ED does not preclude the development or presence of a serious of life threatening condition. As such, patient was instructed to return immediately for any worsening or change in current symptoms.      ED Medication(s):  Medications  "  fluconazole tablet 150 mg (150 mg Oral Given 5/4/19 1948)          Medication List      ASK your doctor about these medications    doxycycline 100 MG Cap  Commonly known as:  VIBRAMYCIN     naproxen 500 MG tablet  Commonly known as:  NAPROSYN  Take 1 tablet (500 mg total) by mouth 2 (two) times daily as needed (Pain).     sertraline 25 MG tablet  Commonly known as:  ZOLOFT            Follow-up Information     [facility] In 2 days.    Specialty:  Obstetrics and Gynecology  Why:  Return to emergency department for:  Fever, odorous vaginal discharge, pelvic pain, fatigue, vaginal bleeding, or other concerns.  Contact information:  [address] [address] [address] [phone]                      MIPS Measures     Smoker? Yes     Hypertension: Pre-hypertension/Hypertension: The pt has been informed that they may have pre-hypertension or hypertension based on a blood pressure reading in the ED. I recommend that the pt call the PCP listed on their discharge instructions or a physician of their choice this week to arrange f/u for further evaluation of possible pre-hypertension or hypertension.          Medical Decision Making           Additional MDM:   Smoking Cessation: The patient is a smoker. The patient was counseled on smoking cessation for: 3 minutes. The patient was counseled on tobacco related  health complications. Appropriate patient literature was given to the patient concerning tobacco cessation.        MDM  Reviewed: nursing note and vitals  Interpretation: labs          Portions of this note may have been created with voice recognition software. Occasional "wrong-word" or "sound-a-like" substitutions may have occurred due to the inherent limitations of voice recognition software. Please, read the note carefully and recognize, using context, where substitutions have occurred.        Clinical Impression       ICD-10-CM ICD-9-CM   1. Vaginal discharge N89.8 623.5   2. Tobacco abuse " Z72.0 305.1   3. Elevated blood pressure reading R03.0 796.2            Disposition: Discharge to home  Patient condition: Good           Ayse Yo,   05/04/19 1959       Ayse Yo,   05/04/19 2205

## 2019-05-05 NOTE — DISCHARGE INSTRUCTIONS
Your blood pressure was elevated in the ED.  You may have high blood pressure.   Keep a log of your blood pressure and follow up with a Primary Care Provider within the next two weeks. Call [phone] for appointment.     Consider using over-the-counter Monistat topically as needed for vaginal itching.

## 2019-05-05 NOTE — ED NOTES
Patient to Er wanting to be examined for a possible STD or reaction to medications due to vaginal irritation. She reports that she miscarried, need to get a blood transfusion post D&C, then started with fevers and was placed on a lot of antibiotics. She denies a vaginal discharge but reports peeling skin possible rash and irritation. Abd soft and non tender, amb with steady gait, skin warm and dry resp even and unlabored. Urine collected, patient placed in hospital gown. Awaiting provider evaluation. Will continue to monitor.

## 2019-11-12 ENCOUNTER — HOSPITAL ENCOUNTER (EMERGENCY)
Facility: HOSPITAL | Age: 24
Discharge: HOME | End: 2019-11-12
Attending: EMERGENCY MEDICINE
Payer: MEDICAID

## 2019-11-12 VITALS
HEART RATE: 100 BPM | DIASTOLIC BLOOD PRESSURE: 73 MMHG | WEIGHT: 293 LBS | BODY MASS INDEX: 44.98 KG/M2 | RESPIRATION RATE: 20 BRPM | SYSTOLIC BLOOD PRESSURE: 146 MMHG | OXYGEN SATURATION: 99 % | TEMPERATURE: 98 F

## 2019-11-12 DIAGNOSIS — T78.40XA ALLERGIC REACTION, INITIAL ENCOUNTER: Primary | ICD-10-CM

## 2019-11-12 PROCEDURE — 99283 EMERGENCY DEPT VISIT LOW MDM: CPT | Mod: ER

## 2019-11-12 RX ORDER — CETIRIZINE HYDROCHLORIDE 10 MG/1
10 TABLET ORAL DAILY
Qty: 30 TABLET | Refills: 0 | COMMUNITY
Start: 2019-11-12 | End: 2020-03-01

## 2019-11-12 NOTE — ED PROVIDER NOTES
Encounter Date: 2019       History     Chief Complaint   Patient presents with    Eye Problem     swelling to right eye for 2 days.      Patient currently presents with concern regarding eyelid swelling. This is localized to the right.  This has been ongoing for about 36 hr.  Patient is unsure what may have caused this to occur.  She notes itching and swelling of the lid but minimal discomfort.  No visual changes or reported.  No discharge from the eyes noted.        Review of patient's allergies indicates:   Allergen Reactions    Penicillins Hives     Past Medical History:   Diagnosis Date    Acanthosis nigricans 3/27/2014    Anxiety     Obesity      Past Surgical History:   Procedure Laterality Date     SECTION      DILATION AND CURETTAGE OF UTERUS       No family history on file.  Social History     Tobacco Use    Smoking status: Current Some Day Smoker    Smokeless tobacco: Never Used   Substance Use Topics    Alcohol use: No    Drug use: No     Review of Systems   Constitutional: Negative for chills and fever.   HENT: Negative for congestion and rhinorrhea.    Eyes: Positive for itching. Negative for photophobia, pain, discharge, redness and visual disturbance.   Respiratory: Negative for cough, chest tightness, shortness of breath and wheezing.    Cardiovascular: Negative for chest pain, palpitations and leg swelling.   Gastrointestinal: Negative for abdominal pain, constipation, diarrhea, nausea and vomiting.   Genitourinary: Negative for dysuria, frequency, urgency, vaginal bleeding and vaginal discharge.   Skin: Negative for color change and rash.   Allergic/Immunologic: Negative for immunocompromised state.   Neurological: Negative for dizziness, weakness and numbness.   Hematological: Negative for adenopathy. Does not bruise/bleed easily.   All other systems reviewed and are negative.    Physical Exam     Initial Vitals [19 0804]   BP Pulse Resp Temp SpO2   (!) 139/101 100  20 98 °F (36.7 °C) 99 %      MAP       --           Physical Exam    Nursing note and vitals reviewed.  Constitutional: She appears well-developed and well-nourished. She is not diaphoretic. No distress.   HENT:   Head: Normocephalic and atraumatic.   Eyes: EOM are normal. Pupils are equal, round, and reactive to light. Right eye exhibits no chemosis, no discharge, no exudate and no hordeolum. No foreign body present in the right eye. Left eye exhibits no chemosis, no discharge, no exudate and no hordeolum. No foreign body present in the left eye. Right conjunctiva is not injected. Right conjunctiva has no hemorrhage. Left conjunctiva is not injected. Left conjunctiva has no hemorrhage.   RIGHT upper lid edema; nontender   Cardiovascular: Normal rate, regular rhythm, normal heart sounds and intact distal pulses.   Pulmonary/Chest: Breath sounds normal. No respiratory distress.   Neurological: She is alert and oriented to person, place, and time.   Skin: Skin is warm and dry.         ED Course   Procedures  Labs Reviewed - No data to display       Imaging Results    None          Medical Decision Making:   ED Management:  All findings were reviewed with the patient/family in detail.  I see no indication of an emergent process beyond that addressed during our encounter but have duly counseled the patient/family regarding the need for prompt follow-up as well as the indications that should prompt immediate return to the emergency room should new or worrisome developments occur.  The patient has additionally been provided with printed information regarding diagnosis as well as instructions regarding follow up and any medications intended to manage the patient's aforementioned conditions.  The patient/family communicates understanding of all this information and all remaining questions and concerns were addressed at this time.                                       Clinical Impression:       ICD-10-CM ICD-9-CM   1.  Allergic reaction, initial encounter T78.40XA 995.3                             Ihsan Pritchard MD  11/12/19 0820

## 2020-01-14 ENCOUNTER — HOSPITAL ENCOUNTER (EMERGENCY)
Facility: HOSPITAL | Age: 25
Discharge: HOME | End: 2020-01-14
Attending: FAMILY MEDICINE
Payer: MEDICAID

## 2020-01-14 VITALS
HEART RATE: 97 BPM | OXYGEN SATURATION: 100 % | TEMPERATURE: 98 F | HEIGHT: 69 IN | SYSTOLIC BLOOD PRESSURE: 161 MMHG | BODY MASS INDEX: 43.4 KG/M2 | RESPIRATION RATE: 18 BRPM | DIASTOLIC BLOOD PRESSURE: 93 MMHG | WEIGHT: 293 LBS

## 2020-01-14 DIAGNOSIS — R33.9 URINARY RETENTION: Primary | ICD-10-CM

## 2020-01-14 LAB
ALBUMIN SERPL BCP-MCNC: 3.4 G/DL (ref 3.5–5.2)
ALP SERPL-CCNC: 83 U/L (ref 55–135)
ALT SERPL W/O P-5'-P-CCNC: 15 U/L (ref 10–44)
ANION GAP SERPL CALC-SCNC: 7 MMOL/L (ref 8–16)
AST SERPL-CCNC: 17 U/L (ref 10–40)
B-HCG UR QL: POSITIVE
BASOPHILS # BLD AUTO: 0.01 K/UL (ref 0–0.2)
BASOPHILS NFR BLD: 0.1 % (ref 0–1.9)
BILIRUB SERPL-MCNC: 0.3 MG/DL (ref 0.1–1)
BILIRUB UR QL STRIP: NEGATIVE
BUN SERPL-MCNC: 11 MG/DL (ref 6–20)
CALCIUM SERPL-MCNC: 9.4 MG/DL (ref 8.7–10.5)
CHLORIDE SERPL-SCNC: 108 MMOL/L (ref 95–110)
CLARITY UR REFRACT.AUTO: CLEAR
CO2 SERPL-SCNC: 26 MMOL/L (ref 23–29)
COLOR UR AUTO: YELLOW
CREAT SERPL-MCNC: 0.7 MG/DL (ref 0.5–1.4)
DIFFERENTIAL METHOD: ABNORMAL
EOSINOPHIL # BLD AUTO: 0.1 K/UL (ref 0–0.5)
EOSINOPHIL NFR BLD: 1 % (ref 0–8)
ERYTHROCYTE [DISTWIDTH] IN BLOOD BY AUTOMATED COUNT: 16.1 % (ref 11.5–14.5)
EST. GFR  (AFRICAN AMERICAN): >60 ML/MIN/1.73 M^2
EST. GFR  (NON AFRICAN AMERICAN): >60 ML/MIN/1.73 M^2
GLUCOSE SERPL-MCNC: 127 MG/DL (ref 70–110)
GLUCOSE UR QL STRIP: NEGATIVE
HCG INTACT+B SERPL-ACNC: 2410 MIU/ML
HCT VFR BLD AUTO: 32.9 % (ref 37–48.5)
HGB BLD-MCNC: 9.9 G/DL (ref 12–16)
HGB UR QL STRIP: NEGATIVE
IMM GRANULOCYTES # BLD AUTO: 0.02 K/UL (ref 0–0.04)
IMM GRANULOCYTES NFR BLD AUTO: 0.3 % (ref 0–0.5)
KETONES UR QL STRIP: NEGATIVE
LEUKOCYTE ESTERASE UR QL STRIP: NEGATIVE
LIPASE SERPL-CCNC: 20 U/L (ref 4–60)
LYMPHOCYTES # BLD AUTO: 2.7 K/UL (ref 1–4.8)
LYMPHOCYTES NFR BLD: 35.2 % (ref 18–48)
MCH RBC QN AUTO: 24.8 PG (ref 27–31)
MCHC RBC AUTO-ENTMCNC: 30.1 G/DL (ref 32–36)
MCV RBC AUTO: 82 FL (ref 82–98)
MONOCYTES # BLD AUTO: 1.1 K/UL (ref 0.3–1)
MONOCYTES NFR BLD: 13.9 % (ref 4–15)
NEUTROPHILS # BLD AUTO: 3.8 K/UL (ref 1.8–7.7)
NEUTROPHILS NFR BLD: 49.5 % (ref 38–73)
NITRITE UR QL STRIP: NEGATIVE
NRBC BLD-RTO: 0 /100 WBC
PH UR STRIP: 6 [PH] (ref 5–8)
PLATELET # BLD AUTO: 361 K/UL (ref 150–350)
PMV BLD AUTO: 10.4 FL (ref 9.2–12.9)
POTASSIUM SERPL-SCNC: 3.8 MMOL/L (ref 3.5–5.1)
PROT SERPL-MCNC: 6.8 G/DL (ref 6–8.4)
PROT UR QL STRIP: NEGATIVE
RBC # BLD AUTO: 4 M/UL (ref 4–5.4)
SODIUM SERPL-SCNC: 141 MMOL/L (ref 136–145)
SP GR UR STRIP: >=1.03 (ref 1–1.03)
URN SPEC COLLECT METH UR: ABNORMAL
UROBILINOGEN UR STRIP-ACNC: <2 EU/DL
WBC # BLD AUTO: 7.65 K/UL (ref 3.9–12.7)

## 2020-01-14 PROCEDURE — 81003 URINALYSIS AUTO W/O SCOPE: CPT | Mod: ER

## 2020-01-14 PROCEDURE — 81025 URINE PREGNANCY TEST: CPT | Mod: ER

## 2020-01-14 PROCEDURE — 83690 ASSAY OF LIPASE: CPT | Mod: ER

## 2020-01-14 PROCEDURE — 85025 COMPLETE CBC W/AUTO DIFF WBC: CPT | Mod: ER

## 2020-01-14 PROCEDURE — 99283 EMERGENCY DEPT VISIT LOW MDM: CPT | Mod: ER

## 2020-01-14 PROCEDURE — 80053 COMPREHEN METABOLIC PANEL: CPT | Mod: ER

## 2020-01-14 PROCEDURE — 84702 CHORIONIC GONADOTROPIN TEST: CPT | Mod: ER

## 2020-01-14 NOTE — ED PROVIDER NOTES
Encounter Date: 2020       History     Chief Complaint   Patient presents with    Abdominal Pain     took a pregnancy test last night and it was positive. 2 miscarriages recently. cramping started about an hour ago.      This is a 24-year-old  female presents emergency department for pelvic discomfort.  Patient states that she took a pregnancy test yesterday and was found positive.  States that she has had 2 miscarriages however has 1 had 1 living birth.  Denies any vaginal bleeding or discharge. Denies any fevers chills or dysuria.  States that she has felt a cramping in her pelvic region however it has resolved after she urinated in the waiting room.  Denies any complaints at this time.        Review of patient's allergies indicates:   Allergen Reactions    Penicillins Hives     Past Medical History:   Diagnosis Date    Acanthosis nigricans 3/27/2014    Anxiety     Miscarriage within last 12 months     Obesity      Past Surgical History:   Procedure Laterality Date     SECTION      DILATION AND CURETTAGE OF UTERUS       History reviewed. No pertinent family history.  Social History     Tobacco Use    Smoking status: Former Smoker    Smokeless tobacco: Never Used   Substance Use Topics    Alcohol use: No    Drug use: No     Review of Systems   Constitutional: Negative for chills, diaphoresis and fever.   HENT: Negative for congestion, postnasal drip, rhinorrhea, sneezing and sore throat.    Eyes: Negative for visual disturbance.   Respiratory: Negative for cough, chest tightness and shortness of breath.    Cardiovascular: Negative for chest pain, palpitations and leg swelling.   Gastrointestinal: Negative for abdominal pain, constipation, diarrhea, nausea and vomiting.   Genitourinary: Positive for pelvic pain. Negative for dysuria, frequency, hematuria and urgency.   Musculoskeletal: Negative for back pain, gait problem and myalgias.   Skin: Negative for rash.   Neurological:  "Negative for weakness, light-headedness, numbness and headaches.   Hematological: Does not bruise/bleed easily.   All other systems reviewed and are negative.      Physical Exam     Initial Vitals [01/14/20 0019]   BP Pulse Resp Temp SpO2   (!) 171/87 (!) 111 18 99 °F (37.2 °C) 100 %      MAP       --         Vitals:    01/14/20 0019 01/14/20 0217   BP: (!) 171/87 (!) 161/93   Pulse: (!) 111 97   Resp: 18 18   Temp: 99 °F (37.2 °C) 98 °F (36.7 °C)   TempSrc: Oral Oral   SpO2: 100% 100%   Weight: (!) 143.2 kg (315 lb 9.4 oz)    Height: 5' 9" (1.753 m)        Physical Exam    Nursing note and vitals reviewed.  Constitutional: She appears well-developed and well-nourished. She is not diaphoretic. No distress.   HENT:   Head: Normocephalic.   Right Ear: External ear normal.   Left Ear: External ear normal.   Mouth/Throat: Oropharynx is clear and moist.   Eyes: Conjunctivae and EOM are normal. Pupils are equal, round, and reactive to light.   Neck: Normal range of motion. Neck supple. No JVD present.   Cardiovascular: Normal rate, regular rhythm and normal heart sounds.   Pulmonary/Chest: Breath sounds normal. No respiratory distress. She has no wheezes.   Abdominal: Soft. Bowel sounds are normal. She exhibits no distension. There is no tenderness. There is no rebound and no guarding.   Musculoskeletal: Normal range of motion. She exhibits no edema or tenderness.   Neurological: She is alert and oriented to person, place, and time. She has normal strength. No cranial nerve deficit or sensory deficit.   Skin: Skin is warm and dry. Capillary refill takes less than 2 seconds. No rash noted.   Psychiatric: She has a normal mood and affect. Thought content normal.         ED Course   Procedures  Labs Reviewed   CBC W/ AUTO DIFFERENTIAL - Abnormal; Notable for the following components:       Result Value    Hemoglobin 9.9 (*)     Hematocrit 32.9 (*)     Mean Corpuscular Hemoglobin 24.8 (*)     Mean Corpuscular Hemoglobin Conc " 30.1 (*)     RDW 16.1 (*)     Platelets 361 (*)     Mono # 1.1 (*)     All other components within normal limits   COMPREHENSIVE METABOLIC PANEL - Abnormal; Notable for the following components:    Glucose 127 (*)     Albumin 3.4 (*)     Anion Gap 7 (*)     All other components within normal limits   URINALYSIS, REFLEX TO URINE CULTURE - Abnormal; Notable for the following components:    Specific Gravity, UA >=1.030 (*)     All other components within normal limits    Narrative:     Preferred Collection Type->Urine, Clean Catch   LIPASE   PREGNANCY TEST, URINE RAPID   HCG, QUANTITATIVE, PREGNANCY           Results for orders placed or performed during the hospital encounter of 01/14/20   CBC auto differential   Result Value Ref Range    WBC 7.65 3.90 - 12.70 K/uL    RBC 4.00 4.00 - 5.40 M/uL    Hemoglobin 9.9 (L) 12.0 - 16.0 g/dL    Hematocrit 32.9 (L) 37.0 - 48.5 %    Mean Corpuscular Volume 82 82 - 98 fL    Mean Corpuscular Hemoglobin 24.8 (L) 27.0 - 31.0 pg    Mean Corpuscular Hemoglobin Conc 30.1 (L) 32.0 - 36.0 g/dL    RDW 16.1 (H) 11.5 - 14.5 %    Platelets 361 (H) 150 - 350 K/uL    MPV 10.4 9.2 - 12.9 fL    Immature Granulocytes 0.3 0.0 - 0.5 %    Gran # (ANC) 3.8 1.8 - 7.7 K/uL    Immature Grans (Abs) 0.02 0.00 - 0.04 K/uL    Lymph # 2.7 1.0 - 4.8 K/uL    Mono # 1.1 (H) 0.3 - 1.0 K/uL    Eos # 0.1 0.0 - 0.5 K/uL    Baso # 0.01 0.00 - 0.20 K/uL    nRBC 0 0 /100 WBC    Gran% 49.5 38.0 - 73.0 %    Lymph% 35.2 18.0 - 48.0 %    Mono% 13.9 4.0 - 15.0 %    Eosinophil% 1.0 0.0 - 8.0 %    Basophil% 0.1 0.0 - 1.9 %    Differential Method Automated    Comprehensive metabolic panel   Result Value Ref Range    Sodium 141 136 - 145 mmol/L    Potassium 3.8 3.5 - 5.1 mmol/L    Chloride 108 95 - 110 mmol/L    CO2 26 23 - 29 mmol/L    Glucose 127 (H) 70 - 110 mg/dL    BUN, Bld 11 6 - 20 mg/dL    Creatinine 0.7 0.5 - 1.4 mg/dL    Calcium 9.4 8.7 - 10.5 mg/dL    Total Protein 6.8 6.0 - 8.4 g/dL    Albumin 3.4 (L) 3.5 - 5.2 g/dL     Total Bilirubin 0.3 0.1 - 1.0 mg/dL    Alkaline Phosphatase 83 55 - 135 U/L    AST 17 10 - 40 U/L    ALT 15 10 - 44 U/L    Anion Gap 7 (L) 8 - 16 mmol/L    eGFR if African American >60.0 >60 mL/min/1.73 m^2    eGFR if non African American >60.0 >60 mL/min/1.73 m^2   Lipase   Result Value Ref Range    Lipase 20 4 - 60 U/L   Urinalysis, Reflex to Urine Culture Urine, Clean Catch   Result Value Ref Range    Specimen UA Urine, Clean Catch     Color, UA Yellow Yellow, Straw, Katherine    Appearance, UA Clear Clear    pH, UA 6.0 5.0 - 8.0    Specific Gravity, UA >=1.030 (A) 1.005 - 1.030    Protein, UA Negative Negative    Glucose, UA Negative Negative    Ketones, UA Negative Negative    Bilirubin (UA) Negative Negative    Occult Blood UA Negative Negative    Nitrite, UA Negative Negative    Urobilinogen, UA <2.0 <2.0 EU/dL    Leukocytes, UA Negative Negative   Pregnancy, urine rapid   Result Value Ref Range    Preg Test, Ur positive    hCG, quantitative, pregnancy   Result Value Ref Range    hCG Quant 2410 See Text mIU/mL       Imaging Results    None                                    2:18 AM RE-EVALUATION & DISPOSITION:   Reassessment at the time of disposition demonstrates that the patient is resting comfortably in no acute distress.  She has remained hemodynamically stable throughout the entire ED visit and is without objective evidence for acute process requiring urgent intervention or hospitalization. I discussed test results and provided counseling to patient with regard to condition, the treatment plan, specific conditions for return, and the importance of follow up.  Answered questions at this time. The patient is stable for discharge.     I discussed with patient that the evaluation in the emergency department does not suggest any emergent or life threatening medical condition requiring immediate intervention beyond what was provided in the ED, and I believe patient is safe for discharge.  Regardless, an  "unremarkable evaluation in the ED does not preclude the development or presence of a serious of life threatening condition. As such, patient was instructed to return immediately for any worsening or change in current symptoms. I also discussed the results of my evaluation and diagnosis with patient and she concurs with the evaluation and management plan.  Detailed written and verbal instructions provided to patient and she expressed a verbal understanding of the discharge instructions and overall management plan. Reiterated the importance of medication administration and safety and advised patient to follow up with primary care provider in 3-5 days or sooner if needed.  Also advised patient to return to the ER for any complications.         Clinical Impression:       ICD-10-CM ICD-9-CM   1. Urinary retention R33.9 788.20         Disposition:   Disposition: Discharged  Condition: Stable    Follow-up Information     [facility]. Schedule an appointment as soon as possible for a visit in 3 days.    Contact information:  [id number]  [place] [address]  [phone]                     Portions of this note may have been created with voice recognition software. Occasional "wrong-word" or "sound-a-like" substitutions may have occurred due to the inherent limitations of voice recognition software. Please, read the note carefully and recognize, using context, where substitutions have occurred.                    Alfreda Rhoades MD  01/14/20 0218    "

## 2020-01-14 NOTE — DISCHARGE INSTRUCTIONS
Please follow up with her primary care physician.  This time your pregnancy test came back positive. You do not have a UTI.  If symptoms worsen you have vaginal bleeding please come back emergency or go to the OBGYN.

## 2020-03-01 ENCOUNTER — HOSPITAL ENCOUNTER (OUTPATIENT)
Facility: HOSPITAL | Age: 25
Discharge: HOME | DRG: 770 | End: 2020-03-03
Attending: EMERGENCY MEDICINE | Admitting: OBSTETRICS & GYNECOLOGY
Payer: MEDICAID

## 2020-03-01 DIAGNOSIS — D64.9 ANEMIA, UNSPECIFIED TYPE: ICD-10-CM

## 2020-03-01 DIAGNOSIS — O03.4 RETAINED PRODUCTS OF CONCEPTION AFTER MISCARRIAGE: ICD-10-CM

## 2020-03-01 DIAGNOSIS — O03.87 SPONTANEOUS ABORTION WITH SEPTICEMIA: ICD-10-CM

## 2020-03-01 DIAGNOSIS — N71.9 ENDOMETRITIS: ICD-10-CM

## 2020-03-01 DIAGNOSIS — O03.39: ICD-10-CM

## 2020-03-01 DIAGNOSIS — R50.9 FEVER: ICD-10-CM

## 2020-03-01 DIAGNOSIS — A41.9 SEPSIS, DUE TO UNSPECIFIED ORGANISM, UNSPECIFIED WHETHER ACUTE ORGAN DYSFUNCTION PRESENT: Primary | ICD-10-CM

## 2020-03-01 LAB
ALBUMIN SERPL BCP-MCNC: 3.1 G/DL (ref 3.5–5.2)
ALP SERPL-CCNC: 71 U/L (ref 55–135)
ALT SERPL W/O P-5'-P-CCNC: 23 U/L (ref 10–44)
AMORPH CRY UR QL COMP ASSIST: ABNORMAL
ANION GAP SERPL CALC-SCNC: 11 MMOL/L (ref 8–16)
AST SERPL-CCNC: 21 U/L (ref 10–40)
B-HCG UR QL: POSITIVE
BACTERIA #/AREA URNS AUTO: ABNORMAL /HPF
BASOPHILS # BLD AUTO: 0.02 K/UL (ref 0–0.2)
BASOPHILS NFR BLD: 0.1 % (ref 0–1.9)
BILIRUB SERPL-MCNC: 0.3 MG/DL (ref 0.1–1)
BILIRUB UR QL STRIP: NEGATIVE
BUN SERPL-MCNC: 6 MG/DL (ref 6–20)
CALCIUM SERPL-MCNC: 9 MG/DL (ref 8.7–10.5)
CHLORIDE SERPL-SCNC: 105 MMOL/L (ref 95–110)
CLARITY UR REFRACT.AUTO: ABNORMAL
CO2 SERPL-SCNC: 23 MMOL/L (ref 23–29)
COLOR UR AUTO: YELLOW
CREAT SERPL-MCNC: 0.7 MG/DL (ref 0.5–1.4)
DIFFERENTIAL METHOD: ABNORMAL
EOSINOPHIL # BLD AUTO: 0 K/UL (ref 0–0.5)
EOSINOPHIL NFR BLD: 0 % (ref 0–8)
ERYTHROCYTE [DISTWIDTH] IN BLOOD BY AUTOMATED COUNT: 16.5 % (ref 11.5–14.5)
EST. GFR  (AFRICAN AMERICAN): >60 ML/MIN/1.73 M^2
EST. GFR  (NON AFRICAN AMERICAN): >60 ML/MIN/1.73 M^2
GLUCOSE SERPL-MCNC: 98 MG/DL (ref 70–110)
GLUCOSE UR QL STRIP: NEGATIVE
HCG INTACT+B SERPL-ACNC: 5784 MIU/ML
HCT VFR BLD AUTO: 27.2 % (ref 37–48.5)
HGB BLD-MCNC: 8.5 G/DL (ref 12–16)
HGB UR QL STRIP: ABNORMAL
IMM GRANULOCYTES # BLD AUTO: 0.05 K/UL (ref 0–0.04)
IMM GRANULOCYTES NFR BLD AUTO: 0.3 % (ref 0–0.5)
INFLUENZA A, MOLECULAR: NEGATIVE
INFLUENZA B, MOLECULAR: NEGATIVE
KETONES UR QL STRIP: ABNORMAL
LACTATE SERPL-SCNC: 1 MMOL/L (ref 0.5–2.2)
LEUKOCYTE ESTERASE UR QL STRIP: ABNORMAL
LYMPHOCYTES # BLD AUTO: 0.8 K/UL (ref 1–4.8)
LYMPHOCYTES NFR BLD: 4.3 % (ref 18–48)
MCH RBC QN AUTO: 25.1 PG (ref 27–31)
MCHC RBC AUTO-ENTMCNC: 31.3 G/DL (ref 32–36)
MCV RBC AUTO: 80 FL (ref 82–98)
MICROSCOPIC COMMENT: ABNORMAL
MONOCYTES # BLD AUTO: 1.1 K/UL (ref 0.3–1)
MONOCYTES NFR BLD: 6 % (ref 4–15)
NEUTROPHILS # BLD AUTO: 16.3 K/UL (ref 1.8–7.7)
NEUTROPHILS NFR BLD: 89.3 % (ref 38–73)
NITRITE UR QL STRIP: NEGATIVE
NRBC BLD-RTO: 0 /100 WBC
PH UR STRIP: >8 [PH] (ref 5–8)
PLATELET # BLD AUTO: 287 K/UL (ref 150–350)
PMV BLD AUTO: 9.8 FL (ref 9.2–12.9)
POTASSIUM SERPL-SCNC: 3.5 MMOL/L (ref 3.5–5.1)
PROT SERPL-MCNC: 6.6 G/DL (ref 6–8.4)
PROT UR QL STRIP: ABNORMAL
RBC # BLD AUTO: 3.39 M/UL (ref 4–5.4)
RBC #/AREA URNS AUTO: 80 /HPF (ref 0–4)
SODIUM SERPL-SCNC: 139 MMOL/L (ref 136–145)
SP GR UR STRIP: 1.01 (ref 1–1.03)
SPECIMEN SOURCE: NORMAL
SQUAMOUS #/AREA URNS AUTO: 4 /HPF
URN SPEC COLLECT METH UR: ABNORMAL
UROBILINOGEN UR STRIP-ACNC: <2 EU/DL
WBC # BLD AUTO: 18.26 K/UL (ref 3.9–12.7)
WBC #/AREA URNS AUTO: 25 /HPF (ref 0–5)

## 2020-03-01 PROCEDURE — 93010 ELECTROCARDIOGRAM REPORT: CPT | Mod: ,,, | Performed by: INTERNAL MEDICINE

## 2020-03-01 PROCEDURE — 96365 THER/PROPH/DIAG IV INF INIT: CPT

## 2020-03-01 PROCEDURE — 83605 ASSAY OF LACTIC ACID: CPT | Mod: PO,ER

## 2020-03-01 PROCEDURE — S0077 INJECTION, CLINDAMYCIN PHOSP: HCPCS | Mod: ER | Performed by: NURSE PRACTITIONER

## 2020-03-01 PROCEDURE — 96367 TX/PROPH/DG ADDL SEQ IV INF: CPT

## 2020-03-01 PROCEDURE — 84702 CHORIONIC GONADOTROPIN TEST: CPT | Mod: PO,ER

## 2020-03-01 PROCEDURE — 80053 COMPREHEN METABOLIC PANEL: CPT | Mod: PO,ER

## 2020-03-01 PROCEDURE — 81025 URINE PREGNANCY TEST: CPT | Mod: ER

## 2020-03-01 PROCEDURE — 63600175 PHARM REV CODE 636 W HCPCS: Mod: ER | Performed by: NURSE PRACTITIONER

## 2020-03-01 PROCEDURE — 96361 HYDRATE IV INFUSION ADD-ON: CPT

## 2020-03-01 PROCEDURE — G0378 HOSPITAL OBSERVATION PER HR: HCPCS

## 2020-03-01 PROCEDURE — 96366 THER/PROPH/DIAG IV INF ADDON: CPT

## 2020-03-01 PROCEDURE — 85025 COMPLETE CBC W/AUTO DIFF WBC: CPT | Mod: PO,ER

## 2020-03-01 PROCEDURE — 87040 BLOOD CULTURE FOR BACTERIA: CPT

## 2020-03-01 PROCEDURE — 11000001 HC ACUTE MED/SURG PRIVATE ROOM

## 2020-03-01 PROCEDURE — 87502 INFLUENZA DNA AMP PROBE: CPT | Mod: ER

## 2020-03-01 PROCEDURE — 93010 EKG 12-LEAD: ICD-10-PCS | Mod: ,,, | Performed by: INTERNAL MEDICINE

## 2020-03-01 PROCEDURE — 87086 URINE CULTURE/COLONY COUNT: CPT

## 2020-03-01 PROCEDURE — 25000003 PHARM REV CODE 250: Mod: ER | Performed by: NURSE PRACTITIONER

## 2020-03-01 PROCEDURE — 99285 EMERGENCY DEPT VISIT HI MDM: CPT | Mod: 25

## 2020-03-01 PROCEDURE — 93005 ELECTROCARDIOGRAM TRACING: CPT | Mod: ER

## 2020-03-01 PROCEDURE — 81000 URINALYSIS NONAUTO W/SCOPE: CPT | Mod: ER

## 2020-03-01 RX ORDER — GENTAMICIN SULFATE 80 MG/100ML
80 INJECTION, SOLUTION INTRAVENOUS ONCE
Status: DISCONTINUED | OUTPATIENT
Start: 2020-03-01 | End: 2020-03-01

## 2020-03-01 RX ORDER — GENTAMICIN SULFATE 80 MG/100ML
80 INJECTION, SOLUTION INTRAVENOUS ONCE
Status: COMPLETED | OUTPATIENT
Start: 2020-03-01 | End: 2020-03-01

## 2020-03-01 RX ORDER — ACETAMINOPHEN 500 MG
1000 TABLET ORAL
Status: COMPLETED | OUTPATIENT
Start: 2020-03-01 | End: 2020-03-01

## 2020-03-01 RX ORDER — CLINDAMYCIN PHOSPHATE 900 MG/50ML
900 INJECTION, SOLUTION INTRAVENOUS
Status: COMPLETED | OUTPATIENT
Start: 2020-03-01 | End: 2020-03-01

## 2020-03-01 RX ORDER — METHYLERGONOVINE MALEATE 0.2 MG/1
0.2 TABLET ORAL 4 TIMES DAILY
COMMUNITY
Start: 2020-02-27 | End: 2020-03-02

## 2020-03-01 RX ADMIN — SODIUM CHLORIDE 4104 ML: 0.9 INJECTION, SOLUTION INTRAVENOUS at 04:03

## 2020-03-01 RX ADMIN — ACETAMINOPHEN 1000 MG: 500 TABLET ORAL at 03:03

## 2020-03-01 RX ADMIN — CLINDAMYCIN IN 5 PERCENT DEXTROSE 900 MG: 18 INJECTION, SOLUTION INTRAVENOUS at 05:03

## 2020-03-01 RX ADMIN — GENTAMICIN SULFATE 80 MG: 80 INJECTION, SOLUTION INTRAVENOUS at 06:03

## 2020-03-01 NOTE — ED PROVIDER NOTES
Encounter Date: 3/1/2020       History     Chief Complaint   Patient presents with    Fever     temp of 101.5, recent miscarriage on 2/27/2020. reports abdominal cramping has resolved. has c/o body aches     The history is provided by the patient.   Fever   Primary symptoms of the febrile illness include fever, fatigue, headaches, abdominal pain (intermittent abdominal cramping), nausea and myalgias (generalized body aches). Primary symptoms do not include visual change, cough, wheezing, shortness of breath, vomiting, diarrhea, dysuria or rash. The current episode started today. This is a new problem. The problem has not changed since onset.  The maximum temperature recorded prior to her arrival was 101 to 101.9 F. The temperature was taken by an oral thermometer.   The fatigue began today.   The headache began today. The pain from the headache is at a severity of 7/10. Location/region(s) of the headache: frontal. The headache is not associated with aura, photophobia, eye pain, visual change, neck stiffness, paresthesias, weakness or loss of balance.   The abdominal pain began more than 2 days ago. The abdominal pain has been resolved since its onset. The abdominal pain is located in the suprapubic region.   Myalgias began today. The myalgias have been unchanged since their onset. The myalgias are generalized. The myalgias are aching. The myalgias are not associated with weakness, tenderness or swelling. The myalgia pain is at a severity of 7/10.   Ms. Qureshi presents to the emergency department with complaints of a fever of 101.5° F that began this morning accompanied by frontal headache and generalized body aches.  The patient denies having a cough, nasal congestion, dysuria, nausea, vomiting, or weakness. She reports that her port friend is also sick with a fever and body aches.  The patient notes she was treated at [facility] on 02/27/2020 for a miscarriage.  She reports that she has been having mild  "abdominal cramping since; however, she states that her abdominal cramping has resolved since this morning.  Ms. Qureshi states that this was her 3rd miscarriage and she is a  4, para 1, Ab 3. She was prescribed methylergonovine 0.2 mg (one tablet four times daily x 2 days) on 2020 upon her discharge from [facility].  She states that she has not completed the prescription yet "because she keeps forgetting to take four tablets a day". She denies any further complaints or concerns at this time.       PCP:      Primary Doctor No        Review of patient's allergies indicates:   Allergen Reactions    Penicillins Hives    Vancomycin analogues      Past Medical History:   Diagnosis Date    Acanthosis nigricans 3/27/2014    Anxiety     Miscarriage within last 12 months     Obesity      Past Surgical History:   Procedure Laterality Date     SECTION      DILATION AND CURETTAGE OF UTERUS       History reviewed. No pertinent family history.  Social History     Tobacco Use    Smoking status: Former Smoker    Smokeless tobacco: Never Used   Substance Use Topics    Alcohol use: No    Drug use: No     Review of Systems   Constitutional: Positive for chills, fatigue and fever.   HENT: Negative for congestion, postnasal drip, rhinorrhea, sinus pressure, sneezing and sore throat.    Eyes: Negative for photophobia and pain.   Respiratory: Negative for cough, chest tightness, shortness of breath and wheezing.    Cardiovascular: Negative for chest pain and palpitations.   Gastrointestinal: Positive for abdominal pain (intermittent abdominal cramping) and nausea. Negative for diarrhea and vomiting.   Endocrine: Negative for polydipsia, polyphagia and polyuria.   Genitourinary: Positive for hematuria and vaginal discharge. Negative for difficulty urinating, dysuria and flank pain.   Musculoskeletal: Positive for myalgias (generalized body aches). Negative for back pain and neck stiffness.   Skin: " Negative for color change and rash.   Neurological: Positive for headaches. Negative for dizziness, weakness, light-headedness, paresthesias and loss of balance.   Hematological: Does not bruise/bleed easily.   Psychiatric/Behavioral: Negative for confusion.       Physical Exam     Initial Vitals [03/01/20 1437]   BP Pulse Resp Temp SpO2   139/78 (!) 129 20 (!) 101.2 °F (38.4 °C) 99 %      MAP       --         Physical Exam    Nursing note and vitals reviewed.  Constitutional: She appears well-developed and well-nourished. She is Obese . She is cooperative. She does not appear ill. No distress.   HENT:   Head: Normocephalic and atraumatic.   Right Ear: Hearing, tympanic membrane, external ear and ear canal normal.   Left Ear: Hearing, tympanic membrane, external ear and ear canal normal.   Nose: Nose normal.   Mouth/Throat: Uvula is midline, oropharynx is clear and moist and mucous membranes are normal.   Eyes: Conjunctivae, EOM and lids are normal. Pupils are equal, round, and reactive to light.   Neck: Trachea normal and normal range of motion. Neck supple.   Cardiovascular: Regular rhythm, intact distal pulses and normal pulses. Tachycardia present.    Pulmonary/Chest: Effort normal and breath sounds normal. No accessory muscle usage. No respiratory distress. She has no decreased breath sounds. She has no wheezes. She has no rhonchi. She has no rales.   Abdominal: Soft. Normal appearance and bowel sounds are normal. She exhibits no distension and no mass. There is no tenderness. There is no rebound and no guarding.   Musculoskeletal: Normal range of motion. She exhibits no edema or tenderness.   Patient has subjective complaints of generalized body aches.    Neurological: She is alert and oriented to person, place, and time. She has normal strength. No sensory deficit. Gait normal. GCS eye subscore is 4. GCS verbal subscore is 5. GCS motor subscore is 6.   Neurovascular intact to all extremities.    Skin: Skin is  warm, dry and intact. Capillary refill takes less than 2 seconds. No rash noted.   Normal color and turgor.    Psychiatric: She has a normal mood and affect. Her speech is normal and behavior is normal. Cognition and memory are normal.         ED Course   Procedures     ED Abnormal Lab Results:   Labs Reviewed   URINALYSIS, REFLEX TO URINE CULTURE - Abnormal; Notable for the following components:       Result Value    Appearance, UA Cloudy (*)     pH, UA >8.0 (*)     Protein, UA Trace (*)     Ketones, UA Trace (*)     Occult Blood UA 3+ (*)     Leukocytes, UA 3+ (*)     All other components within normal limits    Narrative:     Preferred Collection Type->Urine, Clean Catch   PREGNANCY TEST, URINE RAPID - Abnormal; Notable for the following components:    Preg Test, Ur Positive (*)     All other components within normal limits   URINALYSIS MICROSCOPIC - Abnormal; Notable for the following components:    RBC, UA 80 (*)     WBC, UA 25 (*)     All other components within normal limits    Narrative:     Preferred Collection Type->Urine, Clean Catch   CBC W/ AUTO DIFFERENTIAL - Abnormal; Notable for the following components:    WBC 18.26 (*)     RBC 3.39 (*)     Hemoglobin 8.5 (*)     Hematocrit 27.2 (*)     Mean Corpuscular Volume 80 (*)     Mean Corpuscular Hemoglobin 25.1 (*)     Mean Corpuscular Hemoglobin Conc 31.3 (*)     RDW 16.5 (*)     Gran # (ANC) 16.3 (*)     Immature Grans (Abs) 0.05 (*)     Lymph # 0.8 (*)     Mono # 1.1 (*)     Gran% 89.3 (*)     Lymph% 4.3 (*)     All other components within normal limits   COMPREHENSIVE METABOLIC PANEL - Abnormal; Notable for the following components:    Albumin 3.1 (*)     All other components within normal limits   INFLUENZA A & B BY MOLECULAR   CULTURE, BLOOD   CULTURE, BLOOD   CULTURE, URINE   LACTIC ACID, PLASMA   HCG, QUANTITATIVE, PREGNANCY         ED Lab Results:   Results for orders placed or performed during the hospital encounter of 03/01/20   Influenza A & B  by Molecular   Result Value Ref Range    Influenza A, Molecular Negative Negative    Influenza B, Molecular Negative Negative    Flu A & B Source NP    Urinalysis, Reflex to Urine Culture Urine, Clean Catch   Result Value Ref Range    Specimen UA Urine, Clean Catch     Color, UA Yellow Yellow, Straw, Katherine    Appearance, UA Cloudy (A) Clear    pH, UA >8.0 (A) 5.0 - 8.0    Specific Gravity, UA 1.015 1.005 - 1.030    Protein, UA Trace (A) Negative    Glucose, UA Negative Negative    Ketones, UA Trace (A) Negative    Bilirubin (UA) Negative Negative    Occult Blood UA 3+ (A) Negative    Nitrite, UA Negative Negative    Urobilinogen, UA <2.0 <2.0 EU/dL    Leukocytes, UA 3+ (A) Negative   Pregnancy, urine rapid   Result Value Ref Range    Preg Test, Ur Positive (A)    Urinalysis Microscopic   Result Value Ref Range    RBC, UA 80 (H) 0 - 4 /hpf    WBC, UA 25 (H) 0 - 5 /hpf    Bacteria Occasional None-Occ /hpf    Squam Epithel, UA 4 /hpf    Amorphous, UA Rare None-Moderate    Microscopic Comment SEE COMMENT    CBC auto differential   Result Value Ref Range    WBC 18.26 (H) 3.90 - 12.70 K/uL    RBC 3.39 (L) 4.00 - 5.40 M/uL    Hemoglobin 8.5 (L) 12.0 - 16.0 g/dL    Hematocrit 27.2 (L) 37.0 - 48.5 %    Mean Corpuscular Volume 80 (L) 82 - 98 fL    Mean Corpuscular Hemoglobin 25.1 (L) 27.0 - 31.0 pg    Mean Corpuscular Hemoglobin Conc 31.3 (L) 32.0 - 36.0 g/dL    RDW 16.5 (H) 11.5 - 14.5 %    Platelets 287 150 - 350 K/uL    MPV 9.8 9.2 - 12.9 fL    Immature Granulocytes 0.3 0.0 - 0.5 %    Gran # (ANC) 16.3 (H) 1.8 - 7.7 K/uL    Immature Grans (Abs) 0.05 (H) 0.00 - 0.04 K/uL    Lymph # 0.8 (L) 1.0 - 4.8 K/uL    Mono # 1.1 (H) 0.3 - 1.0 K/uL    Eos # 0.0 0.0 - 0.5 K/uL    Baso # 0.02 0.00 - 0.20 K/uL    nRBC 0 0 /100 WBC    Gran% 89.3 (H) 38.0 - 73.0 %    Lymph% 4.3 (L) 18.0 - 48.0 %    Mono% 6.0 4.0 - 15.0 %    Eosinophil% 0.0 0.0 - 8.0 %    Basophil% 0.1 0.0 - 1.9 %    Differential Method Automated    Comprehensive metabolic  panel   Result Value Ref Range    Sodium 139 136 - 145 mmol/L    Potassium 3.5 3.5 - 5.1 mmol/L    Chloride 105 95 - 110 mmol/L    CO2 23 23 - 29 mmol/L    Glucose 98 70 - 110 mg/dL    BUN, Bld 6 6 - 20 mg/dL    Creatinine 0.7 0.5 - 1.4 mg/dL    Calcium 9.0 8.7 - 10.5 mg/dL    Total Protein 6.6 6.0 - 8.4 g/dL    Albumin 3.1 (L) 3.5 - 5.2 g/dL    Total Bilirubin 0.3 0.1 - 1.0 mg/dL    Alkaline Phosphatase 71 55 - 135 U/L    AST 21 10 - 40 U/L    ALT 23 10 - 44 U/L    Anion Gap 11 8 - 16 mmol/L    eGFR if African American >60.0 >60 mL/min/1.73 m^2    eGFR if non African American >60.0 >60 mL/min/1.73 m^2   Lactic acid, plasma #1   Result Value Ref Range    Lactate (Lactic Acid) 1.0 0.5 - 2.2 mmol/L   hCG, quantitative, pregnancy   Result Value Ref Range    hCG Quant 5784 See Text mIU/mL         ED Course Vitals  Vitals:    03/01/20 1602 03/01/20 1624 03/01/20 1702 03/01/20 1709   BP: 115/63 (!) 125/58 (!) 134/58    BP Location:       Patient Position:       Pulse: (!) 119 (!) 114 105    Resp: (!) 22 (!) 22 (!) 21    Temp:    99 °F (37.2 °C)   TempSrc:    Oral   SpO2: 100% 100% 100%    Weight:           ED Medications:   Medications   clindamycin 900 MG/50 ML D5W 900 mg/50 mL IVPB 900 mg (has no administration in time range)   gentamicin 80 mg/100ml NACL IVPB IVPB 80 mg (has no administration in time range)     Followed by   gentamicin 80 mg/100ml NACL IVPB IVPB 80 mg (has no administration in time range)     Followed by   gentamicin 80 mg/100ml NACL IVPB IVPB 80 mg (has no administration in time range)   acetaminophen tablet 1,000 mg (1,000 mg Oral Given 3/1/20 1502)   sodium chloride 0.9% bolus 4,104 mL (4,104 mLs Intravenous New Bag 3/1/20 1625)         1600 HOURS RE-EVALUATION: The patient is resting comfortably and in NAD. Updated patient on case.  Answered questions at this time.      1715 HOURS EVALUATION & CONSULT Discussed case with Dr. Sherlyn Jesus (OB/GYN), who agrees with the treatment plan and recommends  that the patient be transported to the ED in [place] for an ultrasound to rule out endometritis and retained products of conception . The patient is resting comfortably and in NAD. Updated patient on case. She has requested to be transferred to [facility] for ultrasound.  Answered questions at this time.      1750 HOURS: The patient has been stabilized such that, within reasonable medical probability, no material deterioration of the patients condition is likely to result from transfer.The receiving facility, [facility], has available space and qualified personnel for treatment as acknowledged by Maida Mishra MD.The receiving physician, Dr. Maida Mishra, has agreed to accept transfer and to provide appropriate medical treatment. Appropriate medical records of the examination and treatment of the patient will be provided at the time of transfer. The patient will be transferred via [facility] by qualified personnel and transportation equipment as required, including the use of necessary and medically appropriate life support measures. The patient has a verbal understanding of the need for tranfer and agrees with the plan as discussed.              EKG Readings: (Independently Interpreted)   Initial Reading: No STEMI. Previous EKG: Compared with most recent EKG Previous EKG Date: 06/06/2016. Rhythm: Sinus Tachycardia. Heart Rate: 116. Ectopy: No Ectopy. Conduction: Normal. Axis: Normal. Clinical Impression: Sinus Tachycardia            Medical Decision Making:   History:   Old Records Summarized: records from clinic visits.  Clinical Tests:   Lab Tests: Ordered and Reviewed  Medical Tests: Ordered and Reviewed  Other:   I have discussed this case with another health care provider.       <> Summary of the Discussion:   Attending Physician:   Endy Flannery MD  OB/GYN:                     Sherlyn Jesus MD  Accepting Physician:   Maida Rojas  MD Tad                                 Clinical Impression:       ICD-10-CM ICD-9-CM   1. Fever R50.9 780.60   2. Anemia, unspecified type D64.9 285.9   3. Endometritis N71.9 615.9           Disposition:   Disposition: Transferred  Condition: Fair  Reason for referral: Ultrasound to rule out endometritis and retained products of conception  Patient will be transferred from [facility] to [facility] to the emergency department for ultrasound.      ED Disposition Condition    ED to ED Transfer                           Greg Carter NP  03/01/20 5877

## 2020-03-01 NOTE — LETTER
March 3, 2020         [id number] [place] [id number]  Phone: [phone]  Fax: [phone]       Patient: Lise Qureshi   YOB: 1995  Date of Visit: 03/03/2020    To Whom It May Concern:    Rosie Qureshi  was at [facility] on 03/02/2020. She may return to work on 3/9/2020 with no restrictions. If you have any questions or concerns, or if I can be of further assistance, please do not hesitate to contact me.    Sincerely,          Ping Isabel PA-C

## 2020-03-02 ENCOUNTER — ANESTHESIA EVENT (OUTPATIENT)
Dept: SURGERY | Facility: HOSPITAL | Age: 25
DRG: 770 | End: 2020-03-02
Payer: MEDICAID

## 2020-03-02 ENCOUNTER — ANESTHESIA (OUTPATIENT)
Dept: SURGERY | Facility: HOSPITAL | Age: 25
DRG: 770 | End: 2020-03-02
Payer: MEDICAID

## 2020-03-02 PROBLEM — A41.9 SEPSIS: Status: ACTIVE | Noted: 2020-03-02

## 2020-03-02 LAB
BASOPHILS # BLD AUTO: 0.02 K/UL (ref 0–0.2)
BASOPHILS NFR BLD: 0.2 % (ref 0–1.9)
DIFFERENTIAL METHOD: ABNORMAL
EOSINOPHIL # BLD AUTO: 0 K/UL (ref 0–0.5)
EOSINOPHIL NFR BLD: 0 % (ref 0–8)
ERYTHROCYTE [DISTWIDTH] IN BLOOD BY AUTOMATED COUNT: 17 % (ref 11.5–14.5)
GENTAMICIN SERPL-MCNC: 1.7 UG/ML
HCT VFR BLD AUTO: 26 % (ref 37–48.5)
HGB BLD-MCNC: 8.1 G/DL (ref 12–16)
IMM GRANULOCYTES # BLD AUTO: 0.1 K/UL (ref 0–0.04)
IMM GRANULOCYTES NFR BLD AUTO: 0.9 % (ref 0–0.5)
LYMPHOCYTES # BLD AUTO: 0.8 K/UL (ref 1–4.8)
LYMPHOCYTES NFR BLD: 7 % (ref 18–48)
MCH RBC QN AUTO: 24.6 PG (ref 27–31)
MCHC RBC AUTO-ENTMCNC: 31.2 G/DL (ref 32–36)
MCV RBC AUTO: 79 FL (ref 82–98)
MONOCYTES # BLD AUTO: 0.3 K/UL (ref 0.3–1)
MONOCYTES NFR BLD: 2.8 % (ref 4–15)
NEUTROPHILS # BLD AUTO: 10 K/UL (ref 1.8–7.7)
NEUTROPHILS NFR BLD: 89.1 % (ref 38–73)
NRBC BLD-RTO: 0 /100 WBC
PLATELET # BLD AUTO: 244 K/UL (ref 150–350)
PMV BLD AUTO: 11.1 FL (ref 9.2–12.9)
RBC # BLD AUTO: 3.29 M/UL (ref 4–5.4)
WBC # BLD AUTO: 11.19 K/UL (ref 3.9–12.7)

## 2020-03-02 PROCEDURE — 37000008 HC ANESTHESIA 1ST 15 MINUTES: Performed by: OBSTETRICS & GYNECOLOGY

## 2020-03-02 PROCEDURE — 59812 PR SURG RX INCOMPLETE ABORTN: ICD-10-PCS | Mod: ,,, | Performed by: OBSTETRICS & GYNECOLOGY

## 2020-03-02 PROCEDURE — S0077 INJECTION, CLINDAMYCIN PHOSP: HCPCS | Performed by: OBSTETRICS & GYNECOLOGY

## 2020-03-02 PROCEDURE — 88305 TISSUE EXAM BY PATHOLOGIST: CPT | Mod: 26,,, | Performed by: PATHOLOGY

## 2020-03-02 PROCEDURE — 36415 COLL VENOUS BLD VENIPUNCTURE: CPT

## 2020-03-02 PROCEDURE — 71000033 HC RECOVERY, INTIAL HOUR: Performed by: OBSTETRICS & GYNECOLOGY

## 2020-03-02 PROCEDURE — 25000003 PHARM REV CODE 250: Performed by: NURSE ANESTHETIST, CERTIFIED REGISTERED

## 2020-03-02 PROCEDURE — 36000705 HC OR TIME LEV I EA ADD 15 MIN: Performed by: OBSTETRICS & GYNECOLOGY

## 2020-03-02 PROCEDURE — G0378 HOSPITAL OBSERVATION PER HR: HCPCS

## 2020-03-02 PROCEDURE — 88305 TISSUE EXAM BY PATHOLOGIST: ICD-10-PCS | Mod: 26,,, | Performed by: PATHOLOGY

## 2020-03-02 PROCEDURE — 80170 ASSAY OF GENTAMICIN: CPT

## 2020-03-02 PROCEDURE — 01965 ANES INCOMPL/MISSED AB PX: CPT | Performed by: OBSTETRICS & GYNECOLOGY

## 2020-03-02 PROCEDURE — 25000003 PHARM REV CODE 250: Performed by: OBSTETRICS & GYNECOLOGY

## 2020-03-02 PROCEDURE — 63600175 PHARM REV CODE 636 W HCPCS: Performed by: OBSTETRICS & GYNECOLOGY

## 2020-03-02 PROCEDURE — 85025 COMPLETE CBC W/AUTO DIFF WBC: CPT

## 2020-03-02 PROCEDURE — 94761 N-INVAS EAR/PLS OXIMETRY MLT: CPT

## 2020-03-02 PROCEDURE — 11000001 HC ACUTE MED/SURG PRIVATE ROOM

## 2020-03-02 PROCEDURE — 88305 TISSUE EXAM BY PATHOLOGIST: CPT | Performed by: PATHOLOGY

## 2020-03-02 PROCEDURE — 37000009 HC ANESTHESIA EA ADD 15 MINS: Performed by: OBSTETRICS & GYNECOLOGY

## 2020-03-02 PROCEDURE — 36000704 HC OR TIME LEV I 1ST 15 MIN: Performed by: OBSTETRICS & GYNECOLOGY

## 2020-03-02 PROCEDURE — 59812 TREATMENT OF MISCARRIAGE: CPT | Mod: ,,, | Performed by: OBSTETRICS & GYNECOLOGY

## 2020-03-02 PROCEDURE — 63600175 PHARM REV CODE 636 W HCPCS: Performed by: NURSE ANESTHETIST, CERTIFIED REGISTERED

## 2020-03-02 RX ORDER — DEXAMETHASONE SODIUM PHOSPHATE 4 MG/ML
INJECTION, SOLUTION INTRA-ARTICULAR; INTRALESIONAL; INTRAMUSCULAR; INTRAVENOUS; SOFT TISSUE
Status: DISCONTINUED | OUTPATIENT
Start: 2020-03-02 | End: 2020-03-02

## 2020-03-02 RX ORDER — PROPOFOL 10 MG/ML
VIAL (ML) INTRAVENOUS
Status: DISCONTINUED | OUTPATIENT
Start: 2020-03-02 | End: 2020-03-02

## 2020-03-02 RX ORDER — KETOROLAC TROMETHAMINE 30 MG/ML
INJECTION, SOLUTION INTRAMUSCULAR; INTRAVENOUS
Status: DISCONTINUED | OUTPATIENT
Start: 2020-03-02 | End: 2020-03-02

## 2020-03-02 RX ORDER — SODIUM CHLORIDE, SODIUM LACTATE, POTASSIUM CHLORIDE, CALCIUM CHLORIDE 600; 310; 30; 20 MG/100ML; MG/100ML; MG/100ML; MG/100ML
INJECTION, SOLUTION INTRAVENOUS CONTINUOUS PRN
Status: DISCONTINUED | OUTPATIENT
Start: 2020-03-02 | End: 2020-03-02

## 2020-03-02 RX ORDER — HYDROCODONE BITARTRATE AND ACETAMINOPHEN 10; 325 MG/1; MG/1
1 TABLET ORAL EVERY 4 HOURS PRN
Status: DISCONTINUED | OUTPATIENT
Start: 2020-03-02 | End: 2020-03-03 | Stop reason: HOSPADM

## 2020-03-02 RX ORDER — ONDANSETRON 2 MG/ML
INJECTION INTRAMUSCULAR; INTRAVENOUS
Status: DISCONTINUED | OUTPATIENT
Start: 2020-03-02 | End: 2020-03-02

## 2020-03-02 RX ORDER — ONDANSETRON 8 MG/1
8 TABLET, ORALLY DISINTEGRATING ORAL EVERY 8 HOURS PRN
Status: DISCONTINUED | OUTPATIENT
Start: 2020-03-02 | End: 2020-03-03 | Stop reason: HOSPADM

## 2020-03-02 RX ORDER — ONDANSETRON 2 MG/ML
4 INJECTION INTRAMUSCULAR; INTRAVENOUS DAILY PRN
Status: DISCONTINUED | OUTPATIENT
Start: 2020-03-02 | End: 2020-03-02 | Stop reason: HOSPADM

## 2020-03-02 RX ORDER — IBUPROFEN 600 MG/1
600 TABLET ORAL EVERY 6 HOURS PRN
Status: DISCONTINUED | OUTPATIENT
Start: 2020-03-02 | End: 2020-03-03 | Stop reason: HOSPADM

## 2020-03-02 RX ORDER — SODIUM CHLORIDE 0.9 % (FLUSH) 0.9 %
10 SYRINGE (ML) INJECTION
Status: DISCONTINUED | OUTPATIENT
Start: 2020-03-02 | End: 2020-03-03 | Stop reason: HOSPADM

## 2020-03-02 RX ORDER — OXYTOCIN 10 [USP'U]/ML
INJECTION, SOLUTION INTRAMUSCULAR; INTRAVENOUS
Status: DISCONTINUED | OUTPATIENT
Start: 2020-03-02 | End: 2020-03-02

## 2020-03-02 RX ORDER — FENTANYL CITRATE 50 UG/ML
INJECTION, SOLUTION INTRAMUSCULAR; INTRAVENOUS
Status: DISCONTINUED | OUTPATIENT
Start: 2020-03-02 | End: 2020-03-02

## 2020-03-02 RX ORDER — FENTANYL CITRATE 50 UG/ML
25 INJECTION, SOLUTION INTRAMUSCULAR; INTRAVENOUS EVERY 5 MIN PRN
Status: DISCONTINUED | OUTPATIENT
Start: 2020-03-02 | End: 2020-03-02 | Stop reason: HOSPADM

## 2020-03-02 RX ORDER — CLINDAMYCIN PHOSPHATE 900 MG/50ML
900 INJECTION, SOLUTION INTRAVENOUS
Status: DISCONTINUED | OUTPATIENT
Start: 2020-03-02 | End: 2020-03-03 | Stop reason: HOSPADM

## 2020-03-02 RX ORDER — HYDROCODONE BITARTRATE AND ACETAMINOPHEN 5; 325 MG/1; MG/1
1 TABLET ORAL EVERY 4 HOURS PRN
Status: DISCONTINUED | OUTPATIENT
Start: 2020-03-02 | End: 2020-03-03 | Stop reason: HOSPADM

## 2020-03-02 RX ORDER — ROCURONIUM BROMIDE 10 MG/ML
INJECTION, SOLUTION INTRAVENOUS
Status: DISCONTINUED | OUTPATIENT
Start: 2020-03-02 | End: 2020-03-02

## 2020-03-02 RX ORDER — SUCCINYLCHOLINE CHLORIDE 20 MG/ML
INJECTION INTRAMUSCULAR; INTRAVENOUS
Status: DISCONTINUED | OUTPATIENT
Start: 2020-03-02 | End: 2020-03-02

## 2020-03-02 RX ORDER — MIDAZOLAM HYDROCHLORIDE 1 MG/ML
INJECTION, SOLUTION INTRAMUSCULAR; INTRAVENOUS
Status: DISCONTINUED | OUTPATIENT
Start: 2020-03-02 | End: 2020-03-02

## 2020-03-02 RX ADMIN — ONDANSETRON 4 MG: 2 INJECTION, SOLUTION INTRAMUSCULAR; INTRAVENOUS at 01:03

## 2020-03-02 RX ADMIN — FENTANYL CITRATE 50 MCG: 50 INJECTION, SOLUTION INTRAMUSCULAR; INTRAVENOUS at 01:03

## 2020-03-02 RX ADMIN — SUCCINYLCHOLINE CHLORIDE 120 MG: 20 INJECTION, SOLUTION INTRAMUSCULAR; INTRAVENOUS at 01:03

## 2020-03-02 RX ADMIN — OXYTOCIN 20 UNITS: 10 INJECTION, SOLUTION INTRAMUSCULAR; INTRAVENOUS at 01:03

## 2020-03-02 RX ADMIN — PROPOFOL 50 MG: 10 INJECTION, EMULSION INTRAVENOUS at 01:03

## 2020-03-02 RX ADMIN — GENTAMICIN SULFATE 660.8 MG: 40 INJECTION, SOLUTION INTRAMUSCULAR; INTRAVENOUS at 03:03

## 2020-03-02 RX ADMIN — SODIUM CHLORIDE, SODIUM LACTATE, POTASSIUM CHLORIDE, AND CALCIUM CHLORIDE: 600; 310; 30; 20 INJECTION, SOLUTION INTRAVENOUS at 01:03

## 2020-03-02 RX ADMIN — PROPOFOL 150 MG: 10 INJECTION, EMULSION INTRAVENOUS at 01:03

## 2020-03-02 RX ADMIN — CLINDAMYCIN IN 5 PERCENT DEXTROSE 900 MG: 18 INJECTION, SOLUTION INTRAVENOUS at 02:03

## 2020-03-02 RX ADMIN — DEXAMETHASONE SODIUM PHOSPHATE 4 MG: 4 INJECTION, SOLUTION INTRA-ARTICULAR; INTRALESIONAL; INTRAMUSCULAR; INTRAVENOUS; SOFT TISSUE at 01:03

## 2020-03-02 RX ADMIN — KETOROLAC TROMETHAMINE 30 MG: 30 INJECTION, SOLUTION INTRAMUSCULAR; INTRAVENOUS at 01:03

## 2020-03-02 RX ADMIN — HYDROCODONE BITARTRATE AND ACETAMINOPHEN 1 TABLET: 5; 325 TABLET ORAL at 07:03

## 2020-03-02 RX ADMIN — MIDAZOLAM 2 MG: 1 INJECTION INTRAMUSCULAR; INTRAVENOUS at 01:03

## 2020-03-02 RX ADMIN — CLINDAMYCIN IN 5 PERCENT DEXTROSE 900 MG: 18 INJECTION, SOLUTION INTRAVENOUS at 09:03

## 2020-03-02 RX ADMIN — CLINDAMYCIN IN 5 PERCENT DEXTROSE 900 MG: 18 INJECTION, SOLUTION INTRAVENOUS at 05:03

## 2020-03-02 RX ADMIN — ROCURONIUM BROMIDE 10 MG: 10 INJECTION, SOLUTION INTRAVENOUS at 01:03

## 2020-03-02 NOTE — PROGRESS NOTES
[facility] -   Obstetrics & Gynecology  Progress Note    Patient Name: Lise Qureshi  MRN: [id number]  Admission Date: 3/1/2020  Primary Care Provider: Primary Doctor No  Principal Problem: Incomplete  with infection    Subjective:     HPI:  24 y.o. female  s/p spontaneous miscarriage 1 week ago, with c/o temp to 102 at home.  Also c/o body aches and chills.  Light vaginal bleeding, slight cramping.  Patient reports she was 10 weeks EGA when she started having heavy bleeding and cramping.  She was seen at [facility] on  and told there was no fetal heart beat noted on ultrasound.        Interval History: Patient reports she is doing well. She is ambulating well. Voiding well. She is eating and tolerating diet. No fever overnight. Pain is very well controlled. Light vaginal bleeding.      Scheduled Meds:   clindamycin (CLEOCIN) IVPB  900 mg Intravenous Q8H     Continuous Infusions:  PRN Meds:HYDROcodone-acetaminophen, HYDROcodone-acetaminophen, ibuprofen, ondansetron, promethazine (PHENERGAN) IVPB, sodium chloride 0.9%, sodium chloride 0.9%    Review of patient's allergies indicates:   Allergen Reactions    Penicillins Hives    Vancomycin analogues        Objective:     Vital Signs (Most Recent):  Temp: 98.2 °F (36.8 °C) (20)  Pulse: 100 (20)  Resp: 19 (20)  BP: 119/65 (20)  SpO2: 100 % (20) Vital Signs (24h Range):  Temp:  [98.2 °F (36.8 °C)-101.2 °F (38.4 °C)] 98.2 °F (36.8 °C)  Pulse:  [] 100  Resp:  [14-25] 19  SpO2:  [95 %-100 %] 100 %  BP: (114-173)/(53-80) 119/65     Weight: (!) 137.7 kg (303 lb 9.2 oz)  Body mass index is 44.83 kg/m².  Patient's last menstrual period was 12/15/2019.    I&O (Last 24H):    Intake/Output Summary (Last 24 hours) at 3/2/2020 0954  Last data filed at 3/2/2020 0932  Gross per 24 hour   Intake 5684 ml   Output 2650 ml   Net 3034 ml       Physical Exam:   Constitutional: She is  oriented to person, place, and time. She appears well-developed and well-nourished. No distress.       Cardiovascular: Normal rate, regular rhythm, normal heart sounds and intact distal pulses.    No murmur heard.   Pulmonary/Chest: Effort normal and breath sounds normal. No respiratory distress. She has no wheezes. She has no rales.        Abdominal: Soft. Bowel sounds are normal. She exhibits no distension. There is no tenderness. There is no guarding.             Musculoskeletal: Moves all extremeties. She exhibits no edema.   No calf tenderness       Neurological: She is alert and oriented to person, place, and time.    Skin: Skin is warm and dry. No rash noted. She is not diaphoretic.        Laboratory:  I have personallly reviewed all pertinent lab results from the last 24 hours.    Diagnostic Results:  Labs: Reviewed    Assessment/Plan:     * Incomplete  with infection  S/p suction D&C for uterine evacuation.  Now on gentamicin being dosed by pharmacy and clindamycin.  Will continue IV antibiotics post op for at least 24 hours.      Fever  Resolved s/p suction D&C and antibiotics        Ping Isabel PA-C  Obstetrics & Gynecology  [facility]

## 2020-03-02 NOTE — H&P
[facility] -   Obstetrics & Gynecology  History & Physical    Patient Name: Lise Qureshi  MRN: [id number]  Admission Date: 3/1/2020  Primary Care Provider: Primary Doctor No    Subjective:     Chief Complaint/Reason for Admission:  Fever with recent miscarriage    History of Present Illness:  24 y.o. female  s/p spontaneous miscarriage 1 week ago, with c/o temp to 102 at home.  Also c/o body aches and chills.  Light vaginal bleeding, slight cramping.  Patient reports she was 10 weeks EGA when she started having heavy bleeding and cramping.  She was seen at [facility] on  and told there was no fetal heart beat noted on ultrasound.            OB History    Para Term  AB Living   4 0 0 0 3 1   SAB TAB Ectopic Multiple Live Births   3 0 0 0 1      # Outcome Date GA Lbr Marko/2nd Weight Sex Delivery Anes PTL Lv   4 SAB 20 10w4d          3 2019           2 2018           1  07/22/15    F CS-Unspec   KEE     Past Medical History:   Diagnosis Date    Acanthosis nigricans 3/27/2014    Anxiety     Miscarriage within last 12 months     Obesity      Past Surgical History:   Procedure Laterality Date     SECTION      DILATION AND CURETTAGE OF UTERUS           (Not in a hospital admission)    Review of patient's allergies indicates:   Allergen Reactions    Penicillins Hives    Vancomycin analogues         Family History     None        Tobacco Use    Smoking status: Former Smoker    Smokeless tobacco: Never Used   Substance and Sexual Activity    Alcohol use: No    Drug use: No    Sexual activity: Yes     Partners: Male     Review of Systems   Constitutional: Positive for chills and fever.   Respiratory: Negative for cough and shortness of breath.    Cardiovascular: Negative for chest pain.   Gastrointestinal: Negative for abdominal pain, nausea and vomiting.   Genitourinary: Positive for vaginal bleeding. Negative for dysuria,  menorrhagia and pelvic pain.      Objective:     Vital Signs (Most Recent):  Temp: 99.9 °F (37.7 °C) (03/01/20 2026)  Pulse: 109 (03/01/20 2233)  Resp: 18 (03/01/20 2233)  BP: (!) 173/80 (03/01/20 2233)  SpO2: 100 % (03/01/20 2233) Vital Signs (24h Range):  Temp:  [99 °F (37.2 °C)-101.2 °F (38.4 °C)] 99.9 °F (37.7 °C)  Pulse:  [103-129] 109  Resp:  [18-23] 18  SpO2:  [99 %-100 %] 100 %  BP: (114-173)/(53-80) 173/80     Weight: (!) 136.8 kg (301 lb 9.4 oz)  Body mass index is 44.54 kg/m².    Patient's last menstrual period was 12/15/2019.    Physical Exam:   Constitutional: She is oriented to person, place, and time. She appears well-developed and well-nourished. No distress.    HENT:   Head: Normocephalic and atraumatic.     Neck: Neck supple.    Cardiovascular: Normal rate and regular rhythm.     Pulmonary/Chest: Effort normal.        Abdominal: Soft. She exhibits no distension. There is no tenderness.             Musculoskeletal: She exhibits no edema or tenderness.       Neurological: She is alert and oriented to person, place, and time.    Skin: Skin is warm and dry.    Psychiatric: She has a normal mood and affect.       Laboratory:  CBC:   Recent Labs   Lab 03/01/20  1600   WBC 18.26*   RBC 3.39*   HGB 8.5*   HCT 27.2*      MCV 80*   MCH 25.1*   MCHC 31.3*       Diagnostic Results:  US Pelvis Comp with Transvag NON-OB (xpd)  Narrative: EXAMINATION:  US PELVIS COMP WITH TRANSVAG NON-OB (XPD)    CLINICAL HISTORY:  possible retained products of conception;    TECHNIQUE:  Transabdominal sonography of the pelvis was performed, followed by transvaginal sonography to better evaluate the uterus and ovaries.    COMPARISON:  None.    FINDINGS:  Uterus measures 9.5 x 5.6 x 6.1 cm with a thickened endometrium measuring 2.5 cm.  There are scattered calcifications and hyperemia within the thickened endometrium concerning for retained products of conception.  Right ovary measures 3 cm in the left ovary 3.4 cm both  demonstrating flow and no focal abnormalities.  No free fluid.  Impression: Findings concerning for retained products of conception.    Electronically signed by: Margarito Torres Jr., MD  Date:    2020  Time:    22:42      Assessment/Plan:     Incomplete  with infection  Discussed ultrasound findings with patient.  Recommend suction D&C for uterine evacuation.  S/P gentamicin and clindamycin.  Will continue IV antibiotics post op.  Consent for D&C reviewed with patient and signed by her.        Sherlyn Jesus MD  Obstetrics & Gynecology  [facility]

## 2020-03-02 NOTE — ASSESSMENT & PLAN NOTE
Discussed ultrasound findings with patient.  Recommend suction D&C for uterine evacuation.  S/P gentamicin and clindamycin.  Will continue IV antibiotics post op.  Consent for D&C reviewed with patient and signed by her.

## 2020-03-02 NOTE — SUBJECTIVE & OBJECTIVE
Interval History: Patient reports she is doing well. She is ambulating well. Voiding well. She is eating and tolerating diet. No fever overnight. Pain is very well controlled. Light vaginal bleeding.      Scheduled Meds:   clindamycin (CLEOCIN) IVPB  900 mg Intravenous Q8H     Continuous Infusions:  PRN Meds:HYDROcodone-acetaminophen, HYDROcodone-acetaminophen, ibuprofen, ondansetron, promethazine (PHENERGAN) IVPB, sodium chloride 0.9%, sodium chloride 0.9%    Review of patient's allergies indicates:   Allergen Reactions    Penicillins Hives    Vancomycin analogues        Objective:     Vital Signs (Most Recent):  Temp: 98.2 °F (36.8 °C) (03/02/20 0722)  Pulse: 100 (03/02/20 0722)  Resp: 19 (03/02/20 0722)  BP: 119/65 (03/02/20 0722)  SpO2: 100 % (03/02/20 0722) Vital Signs (24h Range):  Temp:  [98.2 °F (36.8 °C)-101.2 °F (38.4 °C)] 98.2 °F (36.8 °C)  Pulse:  [] 100  Resp:  [14-25] 19  SpO2:  [95 %-100 %] 100 %  BP: (114-173)/(53-80) 119/65     Weight: (!) 137.7 kg (303 lb 9.2 oz)  Body mass index is 44.83 kg/m².  Patient's last menstrual period was 12/15/2019.    I&O (Last 24H):    Intake/Output Summary (Last 24 hours) at 3/2/2020 0954  Last data filed at 3/2/2020 0932  Gross per 24 hour   Intake 5684 ml   Output 2650 ml   Net 3034 ml       Physical Exam:   Constitutional: She is oriented to person, place, and time. She appears well-developed and well-nourished. No distress.       Cardiovascular: Normal rate, regular rhythm, normal heart sounds and intact distal pulses.    No murmur heard.   Pulmonary/Chest: Effort normal and breath sounds normal. No respiratory distress. She has no wheezes. She has no rales.        Abdominal: Soft. Bowel sounds are normal. She exhibits no distension. There is no tenderness. There is no guarding.             Musculoskeletal: Moves all extremeties. She exhibits no edema.   No calf tenderness       Neurological: She is alert and oriented to person, place, and time.    Skin:  Skin is warm and dry. No rash noted. She is not diaphoretic.        Laboratory:  I have personallly reviewed all pertinent lab results from the last 24 hours.    Diagnostic Results:  Labs: Reviewed

## 2020-03-02 NOTE — SUBJECTIVE & OBJECTIVE
OB History    Para Term  AB Living   4 0 0 0 3 1   SAB TAB Ectopic Multiple Live Births   3 0 0 0 1      # Outcome Date GA Lbr Marko/2nd Weight Sex Delivery Anes PTL Lv   4 SAB 20 10w4d          3 2019           2 2018           1  07/22/15    F CS-Unspec   KEE     Past Medical History:   Diagnosis Date    Acanthosis nigricans 3/27/2014    Anxiety     Miscarriage within last 12 months     Obesity      Past Surgical History:   Procedure Laterality Date     SECTION      DILATION AND CURETTAGE OF UTERUS           (Not in a hospital admission)    Review of patient's allergies indicates:   Allergen Reactions    Penicillins Hives    Vancomycin analogues         Family History     None        Tobacco Use    Smoking status: Former Smoker    Smokeless tobacco: Never Used   Substance and Sexual Activity    Alcohol use: No    Drug use: No    Sexual activity: Yes     Partners: Male     Review of Systems   Constitutional: Positive for chills and fever.   Respiratory: Negative for cough and shortness of breath.    Cardiovascular: Negative for chest pain.   Gastrointestinal: Negative for abdominal pain, nausea and vomiting.   Genitourinary: Positive for vaginal bleeding. Negative for dysuria, menorrhagia and pelvic pain.      Objective:     Vital Signs (Most Recent):  Temp: 99.9 °F (37.7 °C) (20)  Pulse: 109 (20)  Resp: 18 (20)  BP: (!) 173/80 (20)  SpO2: 100 % (20) Vital Signs (24h Range):  Temp:  [99 °F (37.2 °C)-101.2 °F (38.4 °C)] 99.9 °F (37.7 °C)  Pulse:  [103-129] 109  Resp:  [18-23] 18  SpO2:  [99 %-100 %] 100 %  BP: (114-173)/(53-80) 173/80     Weight: (!) 136.8 kg (301 lb 9.4 oz)  Body mass index is 44.54 kg/m².    Patient's last menstrual period was 12/15/2019.    Physical Exam:   Constitutional: She is oriented to person, place, and time. She appears well-developed and well-nourished. No distress.     HENT:   Head: Normocephalic and atraumatic.     Neck: Neck supple.    Cardiovascular: Normal rate and regular rhythm.     Pulmonary/Chest: Effort normal.        Abdominal: Soft. She exhibits no distension. There is no tenderness.             Musculoskeletal: She exhibits no edema or tenderness.       Neurological: She is alert and oriented to person, place, and time.    Skin: Skin is warm and dry.    Psychiatric: She has a normal mood and affect.       Laboratory:  CBC:   Recent Labs   Lab 03/01/20  1600   WBC 18.26*   RBC 3.39*   HGB 8.5*   HCT 27.2*      MCV 80*   MCH 25.1*   MCHC 31.3*       Diagnostic Results:  US Pelvis Comp with Transvag NON-OB (xpd)  Narrative: EXAMINATION:  US PELVIS COMP WITH TRANSVAG NON-OB (XPD)    CLINICAL HISTORY:  possible retained products of conception;    TECHNIQUE:  Transabdominal sonography of the pelvis was performed, followed by transvaginal sonography to better evaluate the uterus and ovaries.    COMPARISON:  None.    FINDINGS:  Uterus measures 9.5 x 5.6 x 6.1 cm with a thickened endometrium measuring 2.5 cm.  There are scattered calcifications and hyperemia within the thickened endometrium concerning for retained products of conception.  Right ovary measures 3 cm in the left ovary 3.4 cm both demonstrating flow and no focal abnormalities.  No free fluid.  Impression: Findings concerning for retained products of conception.    Electronically signed by: Margarito Torres Jr., MD  Date:    03/01/2020  Time:    22:42

## 2020-03-02 NOTE — TRANSFER OF CARE
Anesthesia Transfer of Care Note    Patient: Lise Qureshi    Procedure(s) Performed: Procedure(s) (LRB):  DILATION AND CURETTAGE, UTERUS, USING SUCTION (N/A)    Patient location: PACU    Anesthesia Type: general    Transport from OR: Transported from OR on room air with adequate spontaneous ventilation    Post pain: adequate analgesia    Post assessment: no apparent anesthetic complications    Post vital signs: stable    Level of consciousness: awake    Nausea/Vomiting: no nausea/vomiting    Complications: none    Transfer of care protocol was followed      Last vitals:   Visit Vitals  /72 (BP Location: Right arm, Patient Position: Lying)   Pulse (!) 113   Temp 37.7 °C (99.9 °F) (Oral)   Resp 18   Wt (!) 136.8 kg (301 lb 9.4 oz)   LMP 12/15/2019   SpO2 100%   BMI 44.54 kg/m²

## 2020-03-02 NOTE — PLAN OF CARE
ABT therapy remains in progress. No adverse reactions noted, remains afebrile. Voices no c/o's of pain. Remains free from injury/incident. Call light in reach.

## 2020-03-02 NOTE — PLAN OF CARE
Pt arrived from PACU to room at 255. Pt remained free of injury during shift, stable condition, denied pain, no acute distress, receiving antibiotics, peripad and mesh underwear with moderate amount of sanguinous drainage, and will continue to monitor. 24hr chart review performed.

## 2020-03-02 NOTE — HOSPITAL COURSE
Patient underwent suction D&C for incomplete miscarriage and septic  and was placed on antibiotic coverage with clindamycin and gentamicin. Her fever resolved and she improved clinically on antibiotics. She will be discharged home to complete doxycycline for 1 week and follow up in 2 weeks in clinic.

## 2020-03-02 NOTE — HPI
24 y.o. female  s/p spontaneous miscarriage 1 week ago, with c/o temp to 102 at home.  Also c/o body aches and chills.  Light vaginal bleeding, slight cramping.  Patient reports she was 10 weeks EGA when she started having heavy bleeding and cramping.  She was seen at [facility] on  and told there was no fetal heart beat noted on ultrasound.

## 2020-03-02 NOTE — OP NOTE
OPERATIVE NOTE    2020    PREOPERATIVE DIAGNOSIS:    1.  Incomplete miscarriage  2.  Septic     POSTOPERATIVE DIAGNOSIS    1.  Incomplete miscarriage  2.  Septic       OPERATIVE PROCEDURE:  Suction Dilation and Curettage    SURGEON:  Sherlyn Jesus MD    ASSISTANT:  None    ANESTHESIA:  General    ESTIMATED BLOOD LOSS:  200 ml    FINDINGS:  Large amount of retained tissue within uterus    COMPLICATIONS:  None    SPECIMENS:  Products of conception      PROCEDURE IN DETAIL:    The procedure was explained to the patient and informed consent was obtained.  The patient was brought to the operating room where general anesthesia was administered.  The patient was positioned in the dorsal lithotomy position and she was prepped and draped in the usual sterile manner.  An in and out catheterization was performed.   A time out was performed.  A weighted speculum was placed in the vagina and the anterior lip of the cervix was grasped with a single tooth tenaculum.  The uterus was sounded to 11 cm.  The cervix was already dilated to a number 13 Hegar dilator.  A number 12 suction curette was then gently advanced into the uterus.  The suction curette was rotated to remove the retained products of conception.  Care was taken not to exceed a pressure of 40 mm Hg.  Following the suction curettage, a sharp curettage was also performed, with a gritty texture noted over the entire endometrial surface.  The suction curette was then placed again to remove all remaining blood clot.  Minimal bleeding was noted at the conclusion of the procedure.      At this time all instruments were removed and the procedure was concluded.  The patient tolerated the procedure well and was awakened from anesthesia and brought to the recovery room in stable condition.  All counts were correct x 2.

## 2020-03-02 NOTE — PLAN OF CARE
Pt to Pacu per stretcher.No distress noted. monitors in place. Small amt vaginal bleeding noted to vpad.See Assess sheet.

## 2020-03-02 NOTE — ANESTHESIA PREPROCEDURE EVALUATION
2020  Lise Qureshi is a 24 y.o., female.    Anesthesia Evaluation    I have reviewed the Patient Summary Reports.    I have reviewed the Nursing Notes.   I have reviewed the Medications.     Review of Systems  Anesthesia Hx:  No problems with previous Anesthesia Denies Hx of Anesthetic complications  Denies Family Hx of Anesthesia complications.   Denies Personal Hx of Anesthesia complications.   Social:  Non-Smoker, No Alcohol Use    Cardiovascular:  Cardiovascular Normal  ECG has been reviewed.    Pulmonary:  Pulmonary Normal    Renal/:  Renal/ Normal     Hepatic/GI:  Hepatic/GI Normal    Neurological:  Neurology Normal    Endocrine:  Endocrine Normal    Psych:  Psychiatric Normal         Patient Active Problem List   Diagnosis    Acanthosis nigricans    Excessive hair on females    Obesity    Fever    Spontaneous  with septicemia    Incomplete  with infection    Sepsis     Past Surgical History:   Procedure Laterality Date     SECTION      DILATION AND CURETTAGE OF UTERUS       No current facility-administered medications on file prior to encounter.      Current Outpatient Medications on File Prior to Encounter   Medication Sig Dispense Refill    methylergonovine (METHERGINE) 0.2 mg tablet Take 0.2 mg by mouth 4 (four) times daily.             Physical Exam  General:  Well nourished    Airway/Jaw/Neck:  Airway Findings: Mouth Opening: Normal Tongue: Normal  General Airway Assessment: Adult  Mallampati: II  TM Distance: 4 - 6 cm  Jaw/Neck Findings:  Neck ROM: Normal ROM      Dental:  Dental Findings: In tact   Chest/Lungs:  Chest/Lungs Findings: Clear to auscultation, Normal Respiratory Rate     Heart/Vascular:  Heart Findings: Rate: Normal  Rhythm: Regular Rhythm  Sounds: Normal        Mental Status:  Mental Status Findings:  Cooperative, Alert and  Oriented       Lab Results   Component Value Date    WBC 18.26 (H) 03/01/2020    HGB 8.5 (L) 03/01/2020    HCT 27.2 (L) 03/01/2020    MCV 80 (L) 03/01/2020     03/01/2020         Chemistry        Component Value Date/Time     03/01/2020 1600    K 3.5 03/01/2020 1600     03/01/2020 1600    CO2 23 03/01/2020 1600    BUN 6 03/01/2020 1600    CREATININE 0.7 03/01/2020 1600    GLU 98 03/01/2020 1600        Component Value Date/Time    CALCIUM 9.0 03/01/2020 1600    ALKPHOS 71 03/01/2020 1600    AST 21 03/01/2020 1600    ALT 23 03/01/2020 1600    BILITOT 0.3 03/01/2020 1600    ESTGFRAFRICA >60.0 03/01/2020 1600    EGFRNONAA >60.0 03/01/2020 1600            Anesthesia Plan  Type of Anesthesia, risks & benefits discussed:  Anesthesia Type:  general  Patient's Preference:   Intra-op Monitoring Plan: standard ASA monitors  Intra-op Monitoring Plan Comments:   Post Op Pain Control Plan: per primary service following discharge from PACU  Post Op Pain Control Plan Comments:   Induction:   IV  Beta Blocker:  Patient is not currently on a Beta-Blocker (No further documentation required).       Informed Consent: Patient understands risks and agrees with Anesthesia plan.  Questions answered. Anesthesia consent signed with patient.  ASA Score: 2  emergent   Day of Surgery Review of History & Physical: I have interviewed and examined the patient. I have reviewed the patient's H&P dated:  There are no significant changes.  H&P update referred to the surgeon.         Ready For Surgery From Anesthesia Perspective.

## 2020-03-02 NOTE — PROVIDER PROGRESS NOTES - EMERGENCY DEPT.
Encounter Date: 3/1/2020    ED Physician Progress Notes       SCRIBE NOTE: Susanne BROWER, am scribing for, and in the presence of,  Alfreda Rhoades MD.  Physician Statement: IAlfreda MD, personally performed the services described in this documentation as scribed by Susanne Ludwig in my presence, and it is both accurate and complete.          9:29 PM: Evaluated pt at this time. Pt has PMHx of G4:P1:Ab3. States she was dx with spontaneous miscarriage at 10 weeks on 2/27/2020 at [facility]. US was suspicious for retained products of conception. Had a pelvic exam done at that time. She was prescribed methylergonovine 0.2 mg (one tablet 4 times daily for 2 days). Today pt is c/o vaginal bleeding, fever, and HA. Patient did not have pelvic exam done today. She was seen at [facility] at 6:00 PM and transferred to [facility] for US as recommended by consulting OBGYN. On exam, pt has mild tenderness to palpation in bilateral pelvic region and is c/o HA.     11:24 PM: Discussed pt's case with Dr. Jesus (OB/GYN) who will come evaluate pt in the ED.    12:00 AM: Dr. Jesus at pt's bedside.     12:15 AM: Discussed case with Dr. Jesus (OB/GYN). Dr. Jesus will take pt to OR for D&C procedure to treat septic Ab. Dr. Jesus agrees with management of pt and accepts admission.   Admitting Service: OBGYN  Admitting Physician: Dr. Jesus  Admit to: observation    12:16 AM: Re-evaluated pt. I have discussed test results, shared treatment plan, and the need for admission with patient at bedside. Pt expresses understanding at this time and agree with all information. All questions answered. Pt has no further questions or concerns at this time. Pt is ready for admit.      Critical Care  Date/Time: 3/2/2020 12:17 AM  Performed by: Alfreda Rhoades MD  Authorized by: Alfreda Rhoades MD   Direct patient critical care time: 7 minutes  Additional history critical care time: 8 minutes  Ordering / reviewing critical care  time: 11 minutes  Documentation critical care time: 3 minutes  Consulting other physicians critical care time: 6 minutes  Consult with family critical care time: 0 minutes  Other critical care time: 0 minutes  Total critical care time (exclusive of procedural time) : 35 minutes  Critical care time was exclusive of separately billable procedures and treating other patients and teaching time.  Critical care was necessary to treat or prevent imminent or life-threatening deterioration of the following conditions: sepsis.  Critical care was time spent personally by me on the following activities: blood draw for specimens, development of treatment plan with patient or surrogate, discussions with consultants, evaluation of patient's response to treatment, examination of patient, obtaining history from patient or surrogate, interpretation of cardiac output measurements, ordering and performing treatments and interventions, ordering and review of laboratory studies, ordering and review of radiographic studies, re-evaluation of patient's condition and review of old charts.              Scribe Attestation:   Scribe #1: I performed the above scribed service and the documentation accurately describes the services I performed. I attest to the accuracy of the note.    Attending Attestation:           Physician Attestation for Scribe:  Physician Attestation Statement for Scribe #1: I, Alfreda Rhoades MD, reviewed documentation, as scribed by Susanne Ludwig in my presence, and it is both accurate and complete.             ICD-10-CM ICD-9-CM   1. Sepsis, due to unspecified organism, unspecified whether acute organ dysfunction present A41.9 038.9     995.91   2. Fever R50.9 780.60   3. Anemia, unspecified type D64.9 285.9   4. Endometritis N71.9 615.9   5. Spontaneous  with septicemia O03.87 634.00   6. Retained products of conception after miscarriage O03.4 634.90       Disposition:   Disposition: Placed in  Observation  Condition: Fair

## 2020-03-02 NOTE — ASSESSMENT & PLAN NOTE
S/p suction D&C for uterine evacuation.  Now on gentamicin being dosed by pharmacy and clindamycin.  Will continue IV antibiotics post op for at least 24 hours.

## 2020-03-02 NOTE — PROGRESS NOTES
Pharmacokinetic Initial Assessment: Gentamicin    Assessment:  Weight utilized for dose calculation: Adjusted Body Weight (>130% IBW)  Dosing method utilized: extended interval dosing    Plan: Extended interval dosing regimen: Gentamicin 660.8mg mg IV once, followed by a random level to be drawn on 3/2 at 11:30, 8-12 hours after the first dose.    Pharmacy will continue to monitor.    Please contact pharmacy at extension [phone] with any questions regarding this assessment.    Thank you for the consult,    Brijesh Maldonado       Patient brief summary:  Lise Qureshi is a 24 y.o. female initiated on aminoglycoside therapy for treatment of suspected intra-abdominal infection    Drug Allergies:   Review of patient's allergies indicates:   Allergen Reactions    Penicillins Hives    Vancomycin analogues        Actual Body Weight:   137.7kg    Adjust Body Weight:   94.8kg    Ideal Body Weight:  66.2kg    Renal Function:   Estimated Creatinine Clearance: 187.1 mL/min (based on SCr of 0.7 mg/dL).,     Dialysis Method (if applicable):  N/A    CBC (last 72 hours):  Recent Labs   Lab Result Units 03/01/20  1600   WBC K/uL 18.26*   Hemoglobin g/dL 8.5*   Hematocrit % 27.2*   Platelets K/uL 287   Gran% % 89.3*   Lymph% % 4.3*   Mono% % 6.0   Eosinophil% % 0.0   Basophil% % 0.1   Differential Method  Automated       Metabolic Panel (last 72 hours):  Recent Labs   Lab Result Units 03/01/20  1534 03/01/20  1600   Sodium mmol/L  --  139   Potassium mmol/L  --  3.5   Chloride mmol/L  --  105   CO2 mmol/L  --  23   Glucose mg/dL  --  98   Glucose, UA  Negative  --    BUN, Bld mg/dL  --  6   Creatinine mg/dL  --  0.7   Albumin g/dL  --  3.1*   Total Bilirubin mg/dL  --  0.3   Alkaline Phosphatase U/L  --  71   AST U/L  --  21   ALT U/L  --  23       Microbiologic Results:  Microbiology Results (last 7 days)       Procedure Component Value Units Date/Time    Blood culture x two cultures. Draw prior to antibiotics. [[id number]]  Collected:  03/01/20 1600    Order Status:  Sent Specimen:  Blood from Peripheral, Forearm, Left Updated:  03/02/20 0221    Blood culture x two cultures. Draw prior to antibiotics. [[id number]] Collected:  03/01/20 1616    Order Status:  Sent Specimen:  Blood from Peripheral, Antecubital, Left Updated:  03/02/20 0221    Urine culture [[id number]] Collected:  03/01/20 1534    Order Status:  No result Specimen:  Urine Updated:  03/01/20 1552    Influenza A & B by Molecular [[id number]] Collected:  03/01/20 1501    Order Status:  Completed Specimen:  Nasopharyngeal Swab Updated:  03/01/20 1544     Influenza A, Molecular Negative     Influenza B, Molecular Negative     Flu A & B Source NP

## 2020-03-02 NOTE — PROGRESS NOTES
Pharmacokinetic Follow Up: Gentamicin    Assessment of levels:   Random concentration of 1.7 mcg/mL (8.25 hours post-infusion) corresponds to a dosing interval of every 24 hours per the [place] Nomogram    Regimen Plan:   Continue current dose: Gentamicin 7 mg/kg x 94.8 kg (adjusted BW) = 663.6 mg IV every 24 hours.  Will check trough concentration 30 min prior to second dose on 3/3 at 0300.    Drug levels (last 3 results):    Gentamicin, Random: 1.7 mcg/mL on 3/2/2020 at 1131     Pharmacy will continue to monitor.    Please contact pharmacy at extension [phone] with any questions regarding this assessment.    Thank you for the consult,   Travis Chan,PharmD Candidate  Reviewed and signed off by Hanna PeralesD      Patient brief summary:  Lise Qureshi is a 24 y.o. female initiated on aminoglycoside therapy for treatment of intra-abdominal infection: Incomplete  with infection    Drug Allergies:   Review of patient's allergies indicates:   Allergen Reactions    Penicillins Hives    Vancomycin analogues        Actual Body Weight:   137.7 kg    Adjusted Body Weight: (use for dosing)   94.8 kg    Ideal Body Weight:  66.2 kg    Renal Function:   Estimated Creatinine Clearance: 187.1 mL/min (based on SCr of 0.7 mg/dL).,     Dialysis Method (if applicable):  N/A    CBC (last 72 hours):  Recent Labs   Lab Result Units 20  1600 20  0535   WBC K/uL 18.26* 11.19   Hemoglobin g/dL 8.5* 8.1*   Hematocrit % 27.2* 26.0*   Platelets K/uL 287 244   Gran% % 89.3* 89.1*   Lymph% % 4.3* 7.0*   Mono% % 6.0 2.8*   Eosinophil% % 0.0 0.0   Basophil% % 0.1 0.2   Differential Method  Automated Automated     Metabolic Panel (last 72 hours):  Recent Labs   Lab Result Units 20  1534 20  1600   Sodium mmol/L  --  139   Potassium mmol/L  --  3.5   Chloride mmol/L  --  105   CO2 mmol/L  --  23   Glucose mg/dL  --  98   Glucose, UA  Negative  --    BUN, Bld mg/dL  --  6   Creatinine mg/dL  --  0.7    Albumin g/dL  --  3.1*   Total Bilirubin mg/dL  --  0.3   Alkaline Phosphatase U/L  --  71   AST U/L  --  21   ALT U/L  --  23     Aminoglycoside Administrations:  aminoglycosides given in last 96 hours                   gentamicin (GARAMYCIN) 660.8 mg in sodium chloride 0.9% 100 mL IVPB (mg) 660.8 mg New Bag 03/02/20 0321    gentamicin 80 mg/100ml NACL IVPB IVPB 80 mg (mg) 80 mg New Bag 03/01/20 1830              Microbiologic Results:  Microbiology Results (last 7 days)     Procedure Component Value Units Date/Time    Blood culture x two cultures. Draw prior to antibiotics. [[id number]] Collected:  03/01/20 1600    Order Status:  Completed Specimen:  Blood from Peripheral, Forearm, Left Updated:  03/02/20 1115     Blood Culture, Routine No Growth to date    Narrative:       Aerobic and anaerobic    Blood culture x two cultures. Draw prior to antibiotics. [[id number]] Collected:  03/01/20 1616    Order Status:  Completed Specimen:  Blood from Peripheral, Antecubital, Left Updated:  03/02/20 1115     Blood Culture, Routine No Growth to date    Narrative:       Aerobic and anaerobic    Urine culture [[id number]] Collected:  03/01/20 1534    Order Status:  No result Specimen:  Urine Updated:  03/01/20 1552    Influenza A & B by Molecular [[id number]] Collected:  03/01/20 1501    Order Status:  Completed Specimen:  Nasopharyngeal Swab Updated:  03/01/20 1544     Influenza A, Molecular Negative     Influenza B, Molecular Negative     Flu A & B Source NP        Thank you for allowing us to participate in this patient's care.     Yara Boateng PharmD 03/02/2020 2:24 PM

## 2020-03-02 NOTE — ANESTHESIA PROCEDURE NOTES
Intubation  Performed by: Amy Holstein, CRNA  Authorized by: Amy Holstein, CRNA     Intubation:     Induction:  Rapid sequence induction    Intubated:  Postinduction    Attempts:  1    Attempted By:  CRNA    Method of Intubation:  Direct    Blade:  Fraser 2    Laryngeal View Grade: Grade IIA - cords partially seen      Difficult Airway Encountered?: No      Complications:  None    Airway Device:  Oral endotracheal tube    Airway Device Size:  7.0    Style/Cuff Inflation:  Cuffed (inflated to minimal occlusive pressure)    Tube secured:  22    Secured at:  The lips    Placement Verified By:  Capnometry and Revisualization with laryngoscopy    Complicating Factors:  None

## 2020-03-03 VITALS
SYSTOLIC BLOOD PRESSURE: 137 MMHG | WEIGHT: 293 LBS | TEMPERATURE: 99 F | RESPIRATION RATE: 20 BRPM | OXYGEN SATURATION: 99 % | HEART RATE: 98 BPM | DIASTOLIC BLOOD PRESSURE: 68 MMHG | HEIGHT: 69 IN | BODY MASS INDEX: 43.4 KG/M2

## 2020-03-03 LAB
BACTERIA UR CULT: NORMAL
BACTERIA UR CULT: NORMAL
GENTAMICIN TROUGH SERPL-MCNC: <0.5 UG/ML (ref 0–2)

## 2020-03-03 PROCEDURE — 80170 ASSAY OF GENTAMICIN: CPT

## 2020-03-03 PROCEDURE — 36415 COLL VENOUS BLD VENIPUNCTURE: CPT

## 2020-03-03 PROCEDURE — 25000003 PHARM REV CODE 250: Performed by: OBSTETRICS & GYNECOLOGY

## 2020-03-03 PROCEDURE — G0378 HOSPITAL OBSERVATION PER HR: HCPCS

## 2020-03-03 PROCEDURE — 63600175 PHARM REV CODE 636 W HCPCS: Performed by: OBSTETRICS & GYNECOLOGY

## 2020-03-03 PROCEDURE — S0077 INJECTION, CLINDAMYCIN PHOSP: HCPCS | Performed by: OBSTETRICS & GYNECOLOGY

## 2020-03-03 RX ORDER — IBUPROFEN 600 MG/1
600 TABLET ORAL EVERY 6 HOURS PRN
Start: 2020-03-03 | End: 2020-03-22

## 2020-03-03 RX ORDER — DOXYCYCLINE HYCLATE 100 MG
100 TABLET ORAL 2 TIMES DAILY
Qty: 14 TABLET | Refills: 0 | Status: SHIPPED | OUTPATIENT
Start: 2020-03-03 | End: 2020-03-10

## 2020-03-03 RX ORDER — ACETAMINOPHEN 500 MG
500 TABLET ORAL EVERY 6 HOURS PRN
Refills: 0 | COMMUNITY
Start: 2020-03-03 | End: 2020-03-22

## 2020-03-03 RX ADMIN — GENTAMICIN SULFATE 663.6 MG: 40 INJECTION, SOLUTION INTRAMUSCULAR; INTRAVENOUS at 04:03

## 2020-03-03 RX ADMIN — CLINDAMYCIN IN 5 PERCENT DEXTROSE 900 MG: 18 INJECTION, SOLUTION INTRAVENOUS at 09:03

## 2020-03-03 RX ADMIN — CLINDAMYCIN IN 5 PERCENT DEXTROSE 900 MG: 18 INJECTION, SOLUTION INTRAVENOUS at 02:03

## 2020-03-03 NOTE — PLAN OF CARE
Pt remained free of injury during shift, stable condition, pain adequately controlled, no acute distress, receiving antibiotics, peripad and mesh underwear with small amount of sanguinous drainage, and will continue to monitor. 24hr chart review performed.

## 2020-03-03 NOTE — ANESTHESIA POSTPROCEDURE EVALUATION
Anesthesia Post Evaluation    Patient: Lise Qureshi    Procedure(s) Performed: Procedure(s) (LRB):  DILATION AND CURETTAGE, UTERUS, USING SUCTION (N/A)    Final Anesthesia Type: general    Patient location during evaluation: PACU  Patient participation: Yes- Able to Participate  Level of consciousness: awake and alert  Post-procedure vital signs: reviewed and stable  Pain management: adequate  Airway patency: patent  KEANU mitigation strategies: Extubation while patient is awake  PONV status at discharge: No PONV  Anesthetic complications: no      Cardiovascular status: hemodynamically stable  Respiratory status: spontaneous ventilation  Hydration status: euvolemic  Follow-up not needed.          Vitals Value Taken Time   /68 3/3/2020  7:27 AM   Temp 36.9 °C (98.5 °F) 3/3/2020  7:27 AM   Pulse 98 3/3/2020  7:27 AM   Resp 20 3/3/2020  7:27 AM   SpO2 99 % 3/3/2020  7:27 AM         Event Time     Out of Recovery 03/02/2020 02:52:12          Pain/Karen Score: Pain Rating Prior to Med Admin: 5 (3/2/2020  7:29 PM)  Pain Rating Post Med Admin: 0 (3/2/2020  8:29 PM)  Karen Score: 10 (3/2/2020  2:45 AM)

## 2020-03-03 NOTE — DISCHARGE SUMMARY
[facility] -   Obstetrics & Gynecology  Discharge Summary    Patient Name: Lise Qureshi  MRN: [id number]  Admission Date: 3/1/2020  Hospital Length of Stay: 1 days  Discharge Date and Time:  2020 8:36 AM  Attending Physician: Sherlyn Jesus, *   Discharging Provider: Ping Isabel PA-C  Primary Care Provider: Primary Doctor No    HPI:  24 y.o. female  s/p spontaneous miscarriage 1 week ago, with c/o temp to 102 at home.  Also c/o body aches and chills.  Light vaginal bleeding, slight cramping.  Patient reports she was 10 weeks EGA when she started having heavy bleeding and cramping.  She was seen at [facility] on  and told there was no fetal heart beat noted on ultrasound.        Hospital Course:  Patient underwent suction D&C for incomplete miscarriage and septic  and was placed on antibiotic coverage with clindamycin and gentamicin. Her fever resolved and she improved clinically on antibiotics. She will be discharged home to complete doxycycline for 1 week and follow up in 2 weeks in clinic.          Procedure(s) (LRB):  DILATION AND CURETTAGE, UTERUS, USING SUCTION (N/A)     Consults (From admission, onward)        Status Ordering Provider     Pharmacy to dose Aminoglycosides consult  Once     Provider:  (Not yet assigned)    SHERLYN Redd          Significant Diagnostic Studies: Labs: All labs within the past 24 hours have been reviewed    Pending Diagnostic Studies:     Procedure Component Value Units Date/Time    Specimen to Pathology, Surgery Gynecology and Obstetrics [[id number]] Collected:  20 0141    Order Status:  Sent Lab Status:  In process Updated:  20 0847        Final Active Diagnoses:    Diagnosis Date Noted POA    PRINCIPAL PROBLEM:  Incomplete  with infection [O03.39] 2020 Yes    Sepsis [A41.9] 2020 Yes    Fever [R50.9] 2020 Yes      Problems Resolved During this Admission:         Discharged Condition: good    Disposition: Home or Self Care    Follow Up:  Follow-up Information     Sherlyn Jesus MD In 2 weeks.    Specialties:  Obstetrics, Obstetrics and Gynecology  Why:  Post op  Contact information:  [address] [place] [address] [address] Fernando [place] [address] [phone]                 Patient Instructions:      Diet Adult Regular     Other restrictions (specify):   Order Comments: Pelvic rest x 2 weeks (no tampons, intercourse douching); showers only for 2 wks     Call MD for:  temperature >100.4     Call MD for:  severe uncontrolled pain     Call MD for:  difficulty breathing or increased cough     Call MD for:  severe persistent headache     Call MD for:  persistent dizziness, light-headedness, or visual disturbances     Medications:  Reconciled Home Medications:      Medication List      START taking these medications    acetaminophen 500 MG tablet  Commonly known as:  TYLENOL  Take 1 tablet (500 mg total) by mouth every 6 (six) hours as needed for Pain.     doxycycline 100 MG tablet  Commonly known as:  VIBRA-TABS  Take 1 tablet (100 mg total) by mouth 2 (two) times daily. Note to Pharmacy: Can substitute for Monodox if needed  for 7 days     ibuprofen 600 MG tablet  Commonly known as:  ADVIL,MOTRIN  Take 1 tablet (600 mg total) by mouth every 6 (six) hours as needed for Pain.        STOP taking these medications    methylergonovine 0.2 mg tablet  Commonly known as:  METHERGINE            Ping Isabel PA-C  Obstetrics & Gynecology  [facility] -

## 2020-03-03 NOTE — SUBJECTIVE & OBJECTIVE
Interval History: Patient reports she is doing well. She is ambulating well. Voiding well. No nausea or vomiting. She is eating and tolerating diet. No fever overnight. Pain is very well controlled with Tylenol only. Very minimal vaginal bleeding.      Scheduled Meds:   clindamycin (CLEOCIN) IVPB  900 mg Intravenous Q8H    gentamicin  7 mg/kg (Adjusted) Intravenous Q24H     Continuous Infusions:  PRN Meds:HYDROcodone-acetaminophen, HYDROcodone-acetaminophen, ibuprofen, ondansetron, promethazine (PHENERGAN) IVPB, sodium chloride 0.9%, sodium chloride 0.9%    Review of patient's allergies indicates:   Allergen Reactions    Penicillins Hives    Vancomycin analogues        Objective:     Vital Signs (Most Recent):  Temp: 98.5 °F (36.9 °C) (03/03/20 0727)  Pulse: 98 (03/03/20 0727)  Resp: 20 (03/03/20 0727)  BP: 137/68 (03/03/20 0727)  SpO2: 99 % (03/03/20 0727) Vital Signs (24h Range):  Temp:  [97.9 °F (36.6 °C)-99.3 °F (37.4 °C)] 98.5 °F (36.9 °C)  Pulse:  [] 98  Resp:  [16-20] 20  SpO2:  [99 %-100 %] 99 %  BP: (128-144)/(65-80) 137/68     Weight: (!) 137.7 kg (303 lb 9.2 oz)  Body mass index is 44.83 kg/m².  Patient's last menstrual period was 12/15/2019.    I&O (Last 24H):    Intake/Output Summary (Last 24 hours) at 3/3/2020 0825  Last data filed at 3/3/2020 0800  Gross per 24 hour   Intake 1850 ml   Output 500 ml   Net 1350 ml       Physical Exam:   Constitutional: She is oriented to person, place, and time. She appears well-developed and well-nourished. No distress.       Cardiovascular: Normal rate, regular rhythm and normal heart sounds.    No murmur heard.   Pulmonary/Chest: Effort normal and breath sounds normal. No respiratory distress. She has no wheezes. She has no rales.        Abdominal: Soft. Bowel sounds are normal. She exhibits no distension. There is no tenderness. There is no guarding.   Fundus nontender             Musculoskeletal: She exhibits no edema.   No calf tenderness        Neurological: She is alert and oriented to person, place, and time.    Skin: Skin is warm and dry. No rash noted. She is not diaphoretic.        Laboratory:  I have personallly reviewed all pertinent lab results from the last 24 hours.    Diagnostic Results:  Labs: Reviewed

## 2020-03-03 NOTE — PROGRESS NOTES
[facility] -   Obstetrics & Gynecology  Progress Note    Patient Name: Lise Qureshi  MRN: [id number]  Admission Date: 3/1/2020  Primary Care Provider: Primary Doctor No  Principal Problem: Incomplete  with infection    Subjective:     HPI:  24 y.o. female  s/p spontaneous miscarriage 1 week ago, with c/o temp to 102 at home.  Also c/o body aches and chills.  Light vaginal bleeding, slight cramping.  Patient reports she was 10 weeks EGA when she started having heavy bleeding and cramping.  She was seen at [facility] on  and told there was no fetal heart beat noted on ultrasound.        Interval History: Patient reports she is doing well. She is ambulating well. Voiding well. No nausea or vomiting. She is eating and tolerating diet. No fever overnight. Pain is very well controlled with Tylenol only. Very minimal vaginal bleeding.      Scheduled Meds:   clindamycin (CLEOCIN) IVPB  900 mg Intravenous Q8H    gentamicin  7 mg/kg (Adjusted) Intravenous Q24H     Continuous Infusions:  PRN Meds:HYDROcodone-acetaminophen, HYDROcodone-acetaminophen, ibuprofen, ondansetron, promethazine (PHENERGAN) IVPB, sodium chloride 0.9%, sodium chloride 0.9%    Review of patient's allergies indicates:   Allergen Reactions    Penicillins Hives    Vancomycin analogues        Objective:     Vital Signs (Most Recent):  Temp: 98.5 °F (36.9 °C) (20)  Pulse: 98 (20)  Resp: 20 (20)  BP: 137/68 (20)  SpO2: 99 % (20) Vital Signs (24h Range):  Temp:  [97.9 °F (36.6 °C)-99.3 °F (37.4 °C)] 98.5 °F (36.9 °C)  Pulse:  [] 98  Resp:  [16-20] 20  SpO2:  [99 %-100 %] 99 %  BP: (128-144)/(65-80) 137/68     Weight: (!) 137.7 kg (303 lb 9.2 oz)  Body mass index is 44.83 kg/m².  Patient's last menstrual period was 12/15/2019.    I&O (Last 24H):    Intake/Output Summary (Last 24 hours) at 3/3/2020 0825  Last data filed at 3/3/2020 0800  Gross per 24 hour    Intake 1850 ml   Output 500 ml   Net 1350 ml       Physical Exam:   Constitutional: She is oriented to person, place, and time. She appears well-developed and well-nourished. No distress.       Cardiovascular: Normal rate, regular rhythm and normal heart sounds.    No murmur heard.   Pulmonary/Chest: Effort normal and breath sounds normal. No respiratory distress. She has no wheezes. She has no rales.        Abdominal: Soft. Bowel sounds are normal. She exhibits no distension. There is no tenderness. There is no guarding.   Fundus nontender             Musculoskeletal: She exhibits no edema.   No calf tenderness       Neurological: She is alert and oriented to person, place, and time.    Skin: Skin is warm and dry. No rash noted. She is not diaphoretic.        Laboratory:  I have personallly reviewed all pertinent lab results from the last 24 hours.    Diagnostic Results:  Labs: Reviewed    Assessment/Plan:     * Incomplete  with infection  S/p suction D&C for uterine evacuation.  Now s/p 24 hours gentamicin and clindamycin post op.  Plan to discharge home on Doxycycline x 1 week.      Fever  Resolved s/p suction D&C and antibiotics        Ping Isabel PA-C  Obstetrics & Gynecology  [facility] [place]

## 2020-03-03 NOTE — ASSESSMENT & PLAN NOTE
S/p suction D&C for uterine evacuation.  Now s/p 24 hours gentamicin and clindamycin post op.  Plan to discharge home on Doxycycline x 1 week.

## 2020-03-05 NOTE — PLAN OF CARE
03/05/20 1750   Final Note   Assessment Type Final Discharge Note   Anticipated Discharge Disposition Home   Right Care Referral Info   Post Acute Recommendation No Care

## 2020-03-07 LAB
BACTERIA BLD CULT: NORMAL
BACTERIA BLD CULT: NORMAL

## 2020-03-11 LAB
FINAL PATHOLOGIC DIAGNOSIS: NORMAL
GROSS: NORMAL

## 2020-03-24 ENCOUNTER — HOSPITAL ENCOUNTER (EMERGENCY)
Facility: HOSPITAL | Age: 25
Discharge: HOME | End: 2020-03-24
Attending: EMERGENCY MEDICINE
Payer: MEDICAID

## 2020-03-24 VITALS
SYSTOLIC BLOOD PRESSURE: 143 MMHG | BODY MASS INDEX: 44.41 KG/M2 | TEMPERATURE: 100 F | WEIGHT: 293 LBS | RESPIRATION RATE: 16 BRPM | DIASTOLIC BLOOD PRESSURE: 86 MMHG | OXYGEN SATURATION: 99 % | HEIGHT: 68 IN | HEART RATE: 93 BPM

## 2020-03-24 DIAGNOSIS — B34.9 VIRAL SYNDROME: ICD-10-CM

## 2020-03-24 DIAGNOSIS — R05.9 COUGH: Primary | ICD-10-CM

## 2020-03-24 PROCEDURE — 99282 EMERGENCY DEPT VISIT SF MDM: CPT | Mod: ER

## 2020-03-24 NOTE — ED NOTES
Patient AAOx4, ambulatory without issues, chest rise and fall symmetrical, respirations equal and unlabored. Pt here for cough, body aches, sore throat x1 week.

## 2020-03-24 NOTE — DISCHARGE INSTRUCTIONS
Quarantine 14 days  [place] [facility]  Preventing the Spread of Coronavirus Disease 2019 in Homes and Residential Communities      Prevention steps for people with confirmed or suspected COVID-19 (including persons under investigation) who do not need to be hospitalized and people with confirmed COVID-19 who were hospitalized and determined to be medically stable to go home.    Your healthcare provider and public health staff will evaluate whether you can be cared for at home.  Stay home except to get medical care.  Separate yourself from other people and animals in your home  Call ahead before visiting your doctor.  Wear a facemask.  Cover your coughs and sneezes.  Clean your hands often.  Avoid sharing personal household items.  Clean all high-touch surfaces every day.  Monitor your symptoms. Seek prompt medical attention if your illness is worsening (e.g., difficulty breathing). Before seeking care, call your healthcare provider.  If you have a medical emergency and need to call 911, notify the dispatch personnel that you have, or are being evaluated for COVID-19. If possible, put on a facemask before emergency medical services arrive.  Discontinuing home isolation. Call your provider about guidance to discontinue home isolation.    Recommended precautions for household members, intimate partners, and caregivers in a nonhealthcare setting of a patient with symptomatic laboratory-confirmed COVID-19 or a patient under investigation.  Household members, intimate partners, and caregivers in a nonhealthcare setting may have close contact with a person with symptomatic, laboratory-confirmed COVID-19 or a person under investigation. Close contacts should monitor their health; they should call their healthcare provider right away if they develop symptoms suggestive of COVID-19 (e.g., fever, cough, shortness of breath).    Close contacts should also follow these recommendations:  Make sure  that you understand and can help the patient follow their healthcare provider's instructions for medication(s) and care. You should help the patient with basic needs in the home and provide support for getting groceries, prescriptions, and other personal needs.  Monitor the patient's symptoms. If the patient is getting sicker, call his or her healthcare provider and tell them that the patient has laboratory-confirmed COVID-19. This will help the healthcare provider's office take steps to keep other people in the office or waiting room from getting infected. Ask the healthcare provider to call the local or [facility] health department for additional guidance. If the patient has a medical emergency and you need to call 911, notify the dispatch personnel that the patient has, or is being evaluated for COVID-19.  Household members should stay in another room or be  from the patient as much as possible. Household members should use a separate bedroom and bathroom, if available.  Prohibit visitors who do not have an essential need to be in the home.  Household members should care for any pets in the home. Do not handle pets or other animals while sick.  Make sure that shared spaces in the home have good air flow, such as by an air conditioner or an opened window, weather permitting.  Perform hand hygiene frequently. Wash your hands often with soap and water for at least 20 seconds or use an alcohol-based hand  that contains 60 to 95% alcohol, covering all surfaces of your hands and rubbing them together until they feel dry. Soap and water should be used preferentially if hands are visibly dirty.  Avoid touching your eyes, nose, and mouth with unwashed hands.  The patient should wear a facemask when you are around other people. If the patient is not able to wear a facemask (for example, because it causes trouble breathing), you, as the caregiver should wear a mask when you are in the same room as the  patient.  Wear a disposable facemask and gloves when you touch or have contact with the patient's blood, stool, or body fluids, such as saliva, sputum, nasal mucus, vomit, urine.  Throw out disposable facemasks and gloves after using them. Do not reuse.  When removing personal protective equipment, first remove and dispose of gloves. Then, immediately clean your hands with soap and water or alcohol-based hand . Next, remove and dispose of facemask, and immediately clean your hands again with soap and water or alcohol-based hand .  Avoid sharing household items with the patient. You should not share dishes, drinking glasses, cups, eating utensils, towels, bedding, or other items. After the patient uses these items, you should wash them thoroughly (see below Wash laundry thoroughly).  Clean all high-touch surfaces, such as counters, tabletops, doorknobs, bathroom fixtures, toilets, phones, keyboards, tablets, and bedside tables, every day. Also, clean any surfaces that may have blood, stool, or body fluids on them.  Use a household cleaning spray or wipe, according to the label instructions. Labels contain instructions for safe and effective use of the cleaning product including precautions you should take when applying the product, such as wearing gloves and making sure you have good ventilation during use of the product.  Wash laundry thoroughly.  Immediately remove and wash clothes or bedding that have blood, stool, or body fluids on them.  Wear disposable gloves while handling soiled items and keep soiled items away from your body. Clean your hands (with soap and water or an alcohol-based hand ) immediately after removing your gloves.  Read and follow directions on labels of laundry or clothing items and detergent. In general, using a normal laundry detergent according to washing machine instructions and dry thoroughly using the warmest temperatures recommended on the clothing  label.  Place all used disposable gloves, facemasks, and other contaminated items in a lined container before disposing of them with other household waste. Clean your hands (with soap and water or an alcohol-based hand ) immediately after handling these items. Soap and water should be used preferentially if hands are visibly dirty.  Discuss any additional questions with your state or local health department or healthcare provider. Check available hours when contacting your local health department.    For more information see CDC link below.      https://www.cdc.gov/coronavirus/2019-ncov/hcp/guidance-prevent-spread.html#precautions

## 2020-03-24 NOTE — ED PROVIDER NOTES
Encounter Date: 3/24/2020       History     Chief Complaint   Patient presents with    Cough     body aches, sore throat x 1 week     The history is provided by the patient.   Cough   This is a new problem. The current episode started several days ago. The problem occurs constantly. The problem has been unchanged. The cough is non-productive. There has been no fever. Associated symptoms include chills, rhinorrhea, sore throat and myalgias. Pertinent negatives include no ear congestion, no ear pain, no headaches, no shortness of breath, no wheezing and no eye redness.     Review of patient's allergies indicates:   Allergen Reactions    Penicillins Hives    Vancomycin analogues      Past Medical History:   Diagnosis Date    Acanthosis nigricans 3/27/2014    Anxiety     Miscarriage within last 12 months     Obesity      Past Surgical History:   Procedure Laterality Date     SECTION      DILATION AND CURETTAGE OF UTERUS      DILATION AND CURETTAGE OF UTERUS USING SUCTION N/A 3/2/2020    Procedure: DILATION AND CURETTAGE, UTERUS, USING SUCTION;  Surgeon: Sherlyn Jesus MD;  [facility];  Service: OB/GYN;  Laterality: N/A;     No family history on file.  Social History     Tobacco Use    Smoking status: Former Smoker    Smokeless tobacco: Never Used   Substance Use Topics    Alcohol use: No    Drug use: No     Review of Systems   Constitutional: Positive for chills.   HENT: Positive for rhinorrhea and sore throat. Negative for ear pain.    Eyes: Negative for redness.   Respiratory: Positive for cough. Negative for shortness of breath and wheezing.    Gastrointestinal: Negative for abdominal pain, nausea and vomiting.   Genitourinary: Negative for dysuria, pelvic pain, vaginal discharge and vaginal pain.   Musculoskeletal: Positive for myalgias.   Neurological: Negative for headaches.   All other systems reviewed and are negative.      Physical Exam     Initial Vitals [20 0134]    BP Pulse Resp Temp SpO2   (!) 143/86 93 16 99.5 °F (37.5 °C) 99 %      MAP       --         Physical Exam    Nursing note and vitals reviewed.  Constitutional: She appears well-developed and well-nourished. No distress.   HENT:   Head: Normocephalic and atraumatic.   Mouth/Throat: Oropharynx is clear and moist.   Eyes: Conjunctivae and EOM are normal. Pupils are equal, round, and reactive to light.   Neck: Normal range of motion. Neck supple.   Cardiovascular: Normal rate, regular rhythm and normal heart sounds.   Pulmonary/Chest: No respiratory distress. She has no wheezes.   Abdominal: She exhibits no distension. There is no tenderness.   Musculoskeletal: Normal range of motion.   Neurological: She is alert and oriented to person, place, and time. She has normal strength.   Skin: Skin is warm and dry.   Psychiatric: She has a normal mood and affect. Thought content normal.         ED Course   Procedures  Labs Reviewed - No data to display       Imaging Results    None     1:42 AM - Counseling: Spoke with the patient and discussed todays findings, in addition to providing specific details for the plan of care and counseling regarding the diagnosis and prognosis. Questions are answered at this time.                                       Clinical Impression:       ICD-10-CM ICD-9-CM   1. Cough R05 786.2   2. Viral syndrome B34.9 079.99         Disposition:   Disposition: Discharged  Condition: Stable     ED Disposition Condition    Discharge Stable        ED Prescriptions     None        Follow-up Information     Follow up With Specialties Details Why Contact Info    PCP  Call in 1 day      [facility] [place]  If symptoms worsen [address]  [facility] [id number] [phone]                                     Tor Levy MD  03/24/20 0142       Tor Levy MD  03/24/20 0148

## 2020-06-15 ENCOUNTER — HOSPITAL ENCOUNTER (EMERGENCY)
Facility: HOSPITAL | Age: 25
Discharge: HOME | End: 2020-06-16
Attending: EMERGENCY MEDICINE
Payer: MEDICAID

## 2020-06-15 DIAGNOSIS — D64.9 ANEMIA, UNSPECIFIED TYPE: Primary | ICD-10-CM

## 2020-06-15 DIAGNOSIS — R07.9 CHEST PAIN: ICD-10-CM

## 2020-06-15 LAB
B-HCG UR QL: NEGATIVE
BASOPHILS # BLD AUTO: 0.03 K/UL (ref 0–0.2)
BASOPHILS NFR BLD: 0.4 % (ref 0–1.9)
DIFFERENTIAL METHOD: ABNORMAL
EOSINOPHIL # BLD AUTO: 0.1 K/UL (ref 0–0.5)
EOSINOPHIL NFR BLD: 0.7 % (ref 0–8)
ERYTHROCYTE [DISTWIDTH] IN BLOOD BY AUTOMATED COUNT: 19 % (ref 11.5–14.5)
HCT VFR BLD AUTO: 31 % (ref 37–48.5)
HGB BLD-MCNC: 8.6 G/DL (ref 12–16)
IMM GRANULOCYTES # BLD AUTO: 0.01 K/UL (ref 0–0.04)
IMM GRANULOCYTES NFR BLD AUTO: 0.1 % (ref 0–0.5)
LYMPHOCYTES # BLD AUTO: 2.8 K/UL (ref 1–4.8)
LYMPHOCYTES NFR BLD: 38.2 % (ref 18–48)
MCH RBC QN AUTO: 19.9 PG (ref 27–31)
MCHC RBC AUTO-ENTMCNC: 27.7 G/DL (ref 32–36)
MCV RBC AUTO: 72 FL (ref 82–98)
MONOCYTES # BLD AUTO: 0.7 K/UL (ref 0.3–1)
MONOCYTES NFR BLD: 9 % (ref 4–15)
NEUTROPHILS # BLD AUTO: 3.8 K/UL (ref 1.8–7.7)
NEUTROPHILS NFR BLD: 51.6 % (ref 38–73)
NRBC BLD-RTO: 0 /100 WBC
PLATELET # BLD AUTO: 411 K/UL (ref 150–350)
PMV BLD AUTO: 9.3 FL (ref 9.2–12.9)
RBC # BLD AUTO: 4.33 M/UL (ref 4–5.4)
WBC # BLD AUTO: 7.43 K/UL (ref 3.9–12.7)

## 2020-06-15 PROCEDURE — 80053 COMPREHEN METABOLIC PANEL: CPT

## 2020-06-15 PROCEDURE — 86703 HIV-1/HIV-2 1 RESULT ANTBDY: CPT

## 2020-06-15 PROCEDURE — 93005 ELECTROCARDIOGRAM TRACING: CPT

## 2020-06-15 PROCEDURE — 84484 ASSAY OF TROPONIN QUANT: CPT

## 2020-06-15 PROCEDURE — 93010 EKG 12-LEAD: ICD-10-PCS | Mod: ,,, | Performed by: INTERNAL MEDICINE

## 2020-06-15 PROCEDURE — 99284 EMERGENCY DEPT VISIT MOD MDM: CPT | Mod: 25

## 2020-06-15 PROCEDURE — 85379 FIBRIN DEGRADATION QUANT: CPT

## 2020-06-15 PROCEDURE — 85025 COMPLETE CBC W/AUTO DIFF WBC: CPT

## 2020-06-15 PROCEDURE — 93010 ELECTROCARDIOGRAM REPORT: CPT | Mod: ,,, | Performed by: INTERNAL MEDICINE

## 2020-06-15 PROCEDURE — 81025 URINE PREGNANCY TEST: CPT

## 2020-06-16 VITALS
RESPIRATION RATE: 16 BRPM | OXYGEN SATURATION: 100 % | WEIGHT: 293 LBS | SYSTOLIC BLOOD PRESSURE: 123 MMHG | HEIGHT: 68 IN | HEART RATE: 90 BPM | TEMPERATURE: 99 F | DIASTOLIC BLOOD PRESSURE: 77 MMHG | BODY MASS INDEX: 44.41 KG/M2

## 2020-06-16 PROBLEM — R07.9 CHEST PAIN: Status: ACTIVE | Noted: 2020-06-16

## 2020-06-16 PROBLEM — D64.9 ANEMIA: Status: ACTIVE | Noted: 2020-06-16

## 2020-06-16 LAB
ALBUMIN SERPL BCP-MCNC: 3.5 G/DL (ref 3.5–5.2)
ALP SERPL-CCNC: 89 U/L (ref 55–135)
ALT SERPL W/O P-5'-P-CCNC: 15 U/L (ref 10–44)
ANION GAP SERPL CALC-SCNC: 11 MMOL/L (ref 8–16)
AST SERPL-CCNC: 17 U/L (ref 10–40)
BILIRUB SERPL-MCNC: 0.3 MG/DL (ref 0.1–1)
BUN SERPL-MCNC: 10 MG/DL (ref 6–20)
CALCIUM SERPL-MCNC: 8.8 MG/DL (ref 8.7–10.5)
CHLORIDE SERPL-SCNC: 106 MMOL/L (ref 95–110)
CO2 SERPL-SCNC: 23 MMOL/L (ref 23–29)
CREAT SERPL-MCNC: 0.7 MG/DL (ref 0.5–1.4)
D DIMER PPP IA.FEU-MCNC: <0.19 MG/L FEU
EST. GFR  (AFRICAN AMERICAN): >60 ML/MIN/1.73 M^2
EST. GFR  (NON AFRICAN AMERICAN): >60 ML/MIN/1.73 M^2
GLUCOSE SERPL-MCNC: 85 MG/DL (ref 70–110)
HIV 1+2 AB+HIV1 P24 AG SERPL QL IA: NEGATIVE
POTASSIUM SERPL-SCNC: 3.6 MMOL/L (ref 3.5–5.1)
PROT SERPL-MCNC: 7 G/DL (ref 6–8.4)
SODIUM SERPL-SCNC: 140 MMOL/L (ref 136–145)
TROPONIN I SERPL DL<=0.01 NG/ML-MCNC: 0.01 NG/ML (ref 0–0.03)

## 2020-06-16 NOTE — DISCHARGE INSTRUCTIONS
Regarding ANEMIA, I discussed with patient the signs and symptoms related to anemia such as paleness, fatigue, tachycardia, cold hands and feet, brittle nails, headaches, and SOB. Encouraged patient to eat foods high in iron (liver and other meats; seafood; dried fruits, nuts; beans; green leafy vegetables; whole grains; and iron-fortified breads and cereals), take iron supplements with food, increase fiber intake, and take supplement early in day. Advised patient to notify primary care provider of any bothersome side effects (heartburn, constipation, abdominal pain).

## 2020-06-16 NOTE — ED PROVIDER NOTES
"SCRIBE #1 NOTE: I, Jaqui Starr, am scribing for, and in the presence of, Joey Looney MD. I have scribed the HPI, ROS, and PEx.     SCRIBE #2 NOTE: I, Chayo Smallwood, am scribing for, and in the presence of,  Chon Grant Jr., MD. I have scribed the remaining portions of the note not scribed by Scribe #1.     History      Chief Complaint   Patient presents with    Chest Pain     c/o CP that radiates into left arm. describes pain as "sharp" CP x3 days.        Review of patient's allergies indicates:   Allergen Reactions    Penicillins Hives    Vancomycin analogues         HPI   HPI    6/15/2020, 11:08 PM   History obtained from the patient      History of Present Illness: Lise Qureshi is a 24 y.o. female patient who presents to the Emergency Department for CP, onset 3 days PTA. Pt describes the pain as sharp. She states the pain radiates into her L arm. Questionable hx of blood clots. Symptoms are constant and moderate in severity. No mitigating or exacerbating factors reported. Associated sxs include L leg pain and SOB. Patient denies any  cough, congestion, weakness, diaphoresis, leg swelling, nausea, vomiting, and all other sxs at this time. No prior Tx reported. No further complaints or concerns at this time.         Arrival mode: Personal vehicle      PCP: Primary Doctor No       Past Medical History:  Past Medical History:   Diagnosis Date    Acanthosis nigricans 3/27/2014    Anxiety     Miscarriage within last 12 months     Obesity        Past Surgical History:  Past Surgical History:   Procedure Laterality Date     SECTION      DILATION AND CURETTAGE OF UTERUS      DILATION AND CURETTAGE OF UTERUS USING SUCTION N/A 3/2/2020    Procedure: DILATION AND CURETTAGE, UTERUS, USING SUCTION;  Surgeon: Sherlyn Jesus MD;  Location: [facility] OR;  Service: OB/GYN;  Laterality: N/A;         Family History:  History reviewed. No pertinent family history.     Social History:  Social " History     Tobacco Use    Smoking status: Former Smoker    Smokeless tobacco: Never Used   Substance and Sexual Activity    Alcohol use: No    Drug use: No    Sexual activity: Yes     Partners: Male       ROS   Review of Systems   Constitutional: Negative for chills, diaphoresis, fatigue and fever.   HENT: Negative for congestion, rhinorrhea and sore throat.    Respiratory: Positive for shortness of breath. Negative for cough and wheezing.    Cardiovascular: Positive for chest pain. Negative for leg swelling.   Gastrointestinal: Negative for abdominal pain, constipation, diarrhea, nausea and vomiting.   Genitourinary: Negative for dysuria, flank pain and frequency.   Musculoskeletal: Negative for back pain and neck pain.        (+) L leg pain  (+) L arm pain   Skin: Negative for rash.   Neurological: Negative for dizziness, weakness, light-headedness, numbness and headaches.   Hematological: Does not bruise/bleed easily.   All other systems reviewed and are negative.      Physical Exam      Initial Vitals   BP Pulse Resp Temp SpO2   06/15/20 2244 06/15/20 2242 06/15/20 2242 06/15/20 2242 06/15/20 2242   (!) 169/95 95 17 98.1 °F (36.7 °C) 100 %      MAP       --                 Physical Exam  Nursing Notes and Vital Signs Reviewed.  Constitutional: Patient is in mild distress. Well-developed and well-nourished.  Head: Atraumatic. Normocephalic.  Eyes: PERRL. EOM intact. Conjunctivae are not pale. No scleral icterus.  ENT: Mucous membranes are moist. Oropharynx is clear and symmetric.    Neck: Supple. Full ROM. No lymphadenopathy.  Cardiovascular: Regular rate. Regular rhythm. No murmurs, rubs, or gallops. Distal pulses are 2+ and symmetric.  Pulmonary/Chest: No respiratory distress. Clear to auscultation bilaterally. No wheezing or rales.  Abdominal: Soft and non-distended.  There is no tenderness.  No rebound, guarding, or rigidity. Good bowel sounds.  Musculoskeletal: Moves all extremities. No obvious  "deformities. No edema. No calf tenderness.  LLE: no evident deformity. Mild swelling. ROM is normal. Cap refill distally is <2 seconds. DP and PT pulses are equal and 2+ bilaterally. No motor deficit. No distal sensory deficit  Skin: Warm and dry.  Neurological:  Alert, awake, and appropriate.  Normal speech.  No acute focal neurological deficits are appreciated.  Psychiatric: Normal affect. Good eye contact. Appropriate in content.    ED Course    Procedures  ED Vital Signs:  Vitals:    06/15/20 2242 06/15/20 2244 06/15/20 2312 06/16/20 0023   BP:  (!) 169/95     Pulse: 95  94 93   Resp: 17   19   Temp: 98.1 °F (36.7 °C)      TempSrc:       SpO2: 100%   100%   Weight: 135.5 kg (298 lb 11.6 oz)      Height: 5' 8" (1.727 m)       06/16/20 0106   BP: 123/77   Pulse: 90   Resp: 16   Temp: 98.6 °F (37 °C)   TempSrc: Oral   SpO2: 100%   Weight:    Height:        Abnormal Lab Results:  Labs Reviewed   CBC W/ AUTO DIFFERENTIAL - Abnormal; Notable for the following components:       Result Value    Hemoglobin 8.6 (*)     Hematocrit 31.0 (*)     Mean Corpuscular Volume 72 (*)     Mean Corpuscular Hemoglobin 19.9 (*)     Mean Corpuscular Hemoglobin Conc 27.7 (*)     RDW 19.0 (*)     Platelets 411 (*)     All other components within normal limits   HIV 1 / 2 ANTIBODY   COMPREHENSIVE METABOLIC PANEL   TROPONIN I   D DIMER, QUANTITATIVE   PREGNANCY TEST, URINE RAPID    Narrative:     Specimen Source->Urine        All Lab Results:  Results for orders placed or performed during the hospital encounter of 06/15/20   HIV 1/2 Ag/Ab (4th Gen)   Result Value Ref Range    HIV 1/2 Ag/Ab Negative Negative   CBC auto differential   Result Value Ref Range    WBC 7.43 3.90 - 12.70 K/uL    RBC 4.33 4.00 - 5.40 M/uL    Hemoglobin 8.6 (L) 12.0 - 16.0 g/dL    Hematocrit 31.0 (L) 37.0 - 48.5 %    Mean Corpuscular Volume 72 (L) 82 - 98 fL    Mean Corpuscular Hemoglobin 19.9 (L) 27.0 - 31.0 pg    Mean Corpuscular Hemoglobin Conc 27.7 (L) 32.0 - 36.0 " g/dL    RDW 19.0 (H) 11.5 - 14.5 %    Platelets 411 (H) 150 - 350 K/uL    MPV 9.3 9.2 - 12.9 fL    Immature Granulocytes 0.1 0.0 - 0.5 %    Gran # (ANC) 3.8 1.8 - 7.7 K/uL    Immature Grans (Abs) 0.01 0.00 - 0.04 K/uL    Lymph # 2.8 1.0 - 4.8 K/uL    Mono # 0.7 0.3 - 1.0 K/uL    Eos # 0.1 0.0 - 0.5 K/uL    Baso # 0.03 0.00 - 0.20 K/uL    nRBC 0 0 /100 WBC    Gran% 51.6 38.0 - 73.0 %    Lymph% 38.2 18.0 - 48.0 %    Mono% 9.0 4.0 - 15.0 %    Eosinophil% 0.7 0.0 - 8.0 %    Basophil% 0.4 0.0 - 1.9 %    Differential Method Automated    Comprehensive metabolic panel   Result Value Ref Range    Sodium 140 136 - 145 mmol/L    Potassium 3.6 3.5 - 5.1 mmol/L    Chloride 106 95 - 110 mmol/L    CO2 23 23 - 29 mmol/L    Glucose 85 70 - 110 mg/dL    BUN, Bld 10 6 - 20 mg/dL    Creatinine 0.7 0.5 - 1.4 mg/dL    Calcium 8.8 8.7 - 10.5 mg/dL    Total Protein 7.0 6.0 - 8.4 g/dL    Albumin 3.5 3.5 - 5.2 g/dL    Total Bilirubin 0.3 0.1 - 1.0 mg/dL    Alkaline Phosphatase 89 55 - 135 U/L    AST 17 10 - 40 U/L    ALT 15 10 - 44 U/L    Anion Gap 11 8 - 16 mmol/L    eGFR if African American >60 >60 mL/min/1.73 m^2    eGFR if non African American >60 >60 mL/min/1.73 m^2   Troponin I   Result Value Ref Range    Troponin I 0.006 0.000 - 0.026 ng/mL   D dimer, quantitative   Result Value Ref Range    D-Dimer <0.19 <0.50 mg/L FEU   Pregnancy, urine rapid (UPT)   Result Value Ref Range    Preg Test, Ur Negative          Imaging Results:  Imaging Results          X-Ray Chest AP Portable (In process)                Per ED physician, pt's CXR results NAF.    The EKG was ordered, reviewed, and independently interpreted by the ED provider.  Interpretation time: 22:47  Rate: 96 BPM  Rhythm: normal sinus rhythm  Interpretation: No acute ST changes. No STEMI.           The Emergency Provider reviewed the vital signs and test results, which are outlined above.    ED Discussion     11:57 PM: Dr. Looney transfers care of pt to Dr. Grant pending  lab/radiology results.    12:02 AM: Dr. Grant evaluated pt. Agree with Dr. Looney's assessment. Pt is resting comfortably and is in no acute distress. D/w pt all pertinent results. D/w pt any concerns expressed at this time. Answered all questions. Pt expresses understanding at this time.    1:01 AM: Reassessed pt at this time.  Pt states her condition has improved at this time. Discussed with pt all pertinent ED information and results. Discussed pt dx and plan of tx. Gave pt all f/u and return to the ED instructions. All questions and concerns were addressed at this time. Pt expresses understanding of information and instructions, and is comfortable with plan to discharge. Pt is stable for discharge.    I discussed with patient and/or family/caretaker that evaluation in the ED does not suggest any emergent or life threatening medical conditions requiring immediate intervention beyond what was provided in the ED, and I believe patient is safe for discharge.  Regardless, an unremarkable evaluation in the ED does not preclude the development or presence of a serious of life threatening condition. As such, patient was instructed to return immediately for any worsening or change in current symptoms.    Regarding ANEMIA, I discussed with patient the signs and symptoms related to anemia such as paleness, fatigue, tachycardia, cold hands and feet, brittle nails, headaches, and SOB. Encouraged patient to eat foods high in iron (liver and other meats; seafood; dried fruits, nuts; beans; green leafy vegetables; whole grains; and iron-fortified breads and cereals), take iron supplements with food, increase fiber intake, and take supplement early in day. Advised patient to notify primary care provider of any bothersome side effects (heartburn, constipation, abdominal pain).     Regarding CHEST PAIN, I advised the patient that chest pain can be caused by a range of conditions, from not serious to life-threatening such as:  heart attack or a blood clot in your lungs, angina indicating poor blood flow to the heart; infection, inflammation, or a fracture in the bones or cartilage in chest wall; a digestive problem, such as acid reflux or a stomach ulcer; or even an anxiety attack.  Instructed patient to follow up with primary healthcare provider for reevaluation and possible diagnostic studies to find the actual cause of the chest pain. Patient was instructed to call 911 or go to the nearest emergency department if they develop any of the following signs of a heart attack: squeezing, pressure, or pain in the chest that lasts longer than five minutes or returns; discomfort or pain in the back, neck, jaw, stomach, or arm; trouble breathing; nausea or vomiting; lightheadedness;  or a sudden cold sweat, especially with chest pain or trouble breathing.  Also return to the emergency department the chest discomfort gets worse (even with medicine); cough or vomit blood; have bowel movements that are black or bloody; cannot stop vomiting; or develop difficulty swallowing.      ED Medication(s):  Medications - No data to display    Follow-up Information     [facility]. Schedule an appointment as soon as possible for a visit in 1 week.    Contact information:  [address] [facility] [address] [phone]             [facility]. Schedule an appointment as soon as possible for a visit in 1 week.    Specialty: Internal Medicine  Contact information:  [address] [facility] [address] [phone]  Additional information:  2nd Floor - From [address], take the [facility] exit ([address] Enter the facility from the [facility]           [facility].    Specialty: Emergency Medicine  Why: As needed, If symptoms worsen  Contact information:  [address]  [facility] [address] [phone]                 There are no discharge medications for this  patient.      Medical Decision Making    Medical Decision Making:   Clinical Tests:   Lab Tests: Ordered and Reviewed  Radiological Study: Ordered and Reviewed  Medical Tests: Ordered and Reviewed           Scribe Attestation:   Scribe #1: I performed the above scribed service and the documentation accurately describes the services I performed. I attest to the accuracy of the note.    Attending:   Physician Attestation Statement for Scribe #1: I, Joey Looney MD, personally performed the services described in this documentation, as scribed by Jaqui Starr, in my presence, and it is both accurate and complete.       Scribe Attestation:   Scribe #2: I performed the above scribed service and the documentation accurately describes the services I performed. I attest to the accuracy of the note.    Attending Attestation:           Physician Attestation for Scribe:    Physician Attestation Statement for Scribe #2: I, Chon Grant Jr., MD, reviewed documentation, as scribed by Chayo Smallwood in my presence, and it is both accurate and complete. I also acknowledge and confirm the content of the note done by Scribe #1.          Clinical Impression       ICD-10-CM ICD-9-CM   1. Anemia, unspecified type  D64.9 285.9   2. Chest pain  R07.9 786.50       Disposition:   Disposition: Discharged  Condition: Stable         Chon Grant Jr., MD  06/16/20 0514

## 2020-07-30 ENCOUNTER — ANESTHESIA (OUTPATIENT)
Dept: SURGERY | Facility: HOSPITAL | Age: 25
End: 2020-07-30
Payer: MEDICAID

## 2020-07-30 ENCOUNTER — HOSPITAL ENCOUNTER (EMERGENCY)
Facility: HOSPITAL | Age: 25
Discharge: HOME | End: 2020-07-30
Attending: EMERGENCY MEDICINE
Payer: MEDICAID

## 2020-07-30 ENCOUNTER — ANESTHESIA EVENT (OUTPATIENT)
Dept: SURGERY | Facility: HOSPITAL | Age: 25
End: 2020-07-30
Payer: MEDICAID

## 2020-07-30 VITALS
RESPIRATION RATE: 16 BRPM | SYSTOLIC BLOOD PRESSURE: 131 MMHG | WEIGHT: 293 LBS | HEIGHT: 68 IN | DIASTOLIC BLOOD PRESSURE: 65 MMHG | HEART RATE: 74 BPM | TEMPERATURE: 97 F | OXYGEN SATURATION: 100 % | BODY MASS INDEX: 44.41 KG/M2

## 2020-07-30 DIAGNOSIS — O20.9 VAGINAL BLEEDING BEFORE 22 WEEKS GESTATION: ICD-10-CM

## 2020-07-30 DIAGNOSIS — O03.4 INCOMPLETE ABORTION: Primary | ICD-10-CM

## 2020-07-30 DIAGNOSIS — O20.0 THREATENED ABORTION: ICD-10-CM

## 2020-07-30 PROBLEM — O03.39: Status: RESOLVED | Noted: 2020-03-01 | Resolved: 2020-07-30

## 2020-07-30 PROBLEM — R50.9 FEVER: Status: RESOLVED | Noted: 2020-03-01 | Resolved: 2020-07-30

## 2020-07-30 PROBLEM — A41.9 SEPSIS: Status: RESOLVED | Noted: 2020-03-02 | Resolved: 2020-07-30

## 2020-07-30 PROBLEM — N96 RECURRENT PREGNANCY LOSS: Status: ACTIVE | Noted: 2020-07-30

## 2020-07-30 PROBLEM — R07.9 CHEST PAIN: Status: RESOLVED | Noted: 2020-06-16 | Resolved: 2020-07-30

## 2020-07-30 LAB
ABO + RH BLD: NORMAL
ALBUMIN SERPL BCP-MCNC: 3.2 G/DL (ref 3.5–5.2)
ALP SERPL-CCNC: 79 U/L (ref 55–135)
ALT SERPL W/O P-5'-P-CCNC: 15 U/L (ref 10–44)
ANION GAP SERPL CALC-SCNC: 9 MMOL/L (ref 8–16)
AST SERPL-CCNC: 15 U/L (ref 10–40)
BASOPHILS # BLD AUTO: 0.02 K/UL (ref 0–0.2)
BASOPHILS NFR BLD: 0.2 % (ref 0–1.9)
BILIRUB SERPL-MCNC: 0.3 MG/DL (ref 0.1–1)
BLD GP AB SCN CELLS X3 SERPL QL: NORMAL
BUN SERPL-MCNC: 10 MG/DL (ref 6–20)
CALCIUM SERPL-MCNC: 9.1 MG/DL (ref 8.7–10.5)
CHLORIDE SERPL-SCNC: 104 MMOL/L (ref 95–110)
CO2 SERPL-SCNC: 22 MMOL/L (ref 23–29)
CREAT SERPL-MCNC: 0.7 MG/DL (ref 0.5–1.4)
DIFFERENTIAL METHOD: ABNORMAL
EOSINOPHIL # BLD AUTO: 0 K/UL (ref 0–0.5)
EOSINOPHIL NFR BLD: 0.1 % (ref 0–8)
ERYTHROCYTE [DISTWIDTH] IN BLOOD BY AUTOMATED COUNT: 19.9 % (ref 11.5–14.5)
EST. GFR  (AFRICAN AMERICAN): >60 ML/MIN/1.73 M^2
EST. GFR  (NON AFRICAN AMERICAN): >60 ML/MIN/1.73 M^2
GLUCOSE SERPL-MCNC: 112 MG/DL (ref 70–110)
HCG INTACT+B SERPL-ACNC: NORMAL MIU/ML
HCT VFR BLD AUTO: 34.5 % (ref 37–48.5)
HGB BLD-MCNC: 9.7 G/DL (ref 12–16)
IMM GRANULOCYTES # BLD AUTO: 0.03 K/UL (ref 0–0.04)
IMM GRANULOCYTES NFR BLD AUTO: 0.4 % (ref 0–0.5)
LYMPHOCYTES # BLD AUTO: 1.3 K/UL (ref 1–4.8)
LYMPHOCYTES NFR BLD: 15.6 % (ref 18–48)
MCH RBC QN AUTO: 20.8 PG (ref 27–31)
MCHC RBC AUTO-ENTMCNC: 28.1 G/DL (ref 32–36)
MCV RBC AUTO: 74 FL (ref 82–98)
MONOCYTES # BLD AUTO: 0.6 K/UL (ref 0.3–1)
MONOCYTES NFR BLD: 6.7 % (ref 4–15)
NEUTROPHILS # BLD AUTO: 6.6 K/UL (ref 1.8–7.7)
NEUTROPHILS NFR BLD: 77 % (ref 38–73)
NRBC BLD-RTO: 0 /100 WBC
PLATELET # BLD AUTO: 397 K/UL (ref 150–350)
PMV BLD AUTO: 10 FL (ref 9.2–12.9)
POTASSIUM SERPL-SCNC: 3.8 MMOL/L (ref 3.5–5.1)
PROT SERPL-MCNC: 7.1 G/DL (ref 6–8.4)
RBC # BLD AUTO: 4.66 M/UL (ref 4–5.4)
SARS-COV-2 RDRP RESP QL NAA+PROBE: NEGATIVE
SODIUM SERPL-SCNC: 135 MMOL/L (ref 136–145)
WBC # BLD AUTO: 8.51 K/UL (ref 3.9–12.7)

## 2020-07-30 PROCEDURE — 71000033 HC RECOVERY, INTIAL HOUR: Performed by: OBSTETRICS & GYNECOLOGY

## 2020-07-30 PROCEDURE — 88305 TISSUE EXAM BY PATHOLOGIST: ICD-10-PCS | Mod: 26,,, | Performed by: PATHOLOGY

## 2020-07-30 PROCEDURE — 37000009 HC ANESTHESIA EA ADD 15 MINS: Performed by: OBSTETRICS & GYNECOLOGY

## 2020-07-30 PROCEDURE — 81229 CYTOG ALYS CHRML ABNR SNPCGH: CPT

## 2020-07-30 PROCEDURE — 71000015 HC POSTOP RECOV 1ST HR: Performed by: OBSTETRICS & GYNECOLOGY

## 2020-07-30 PROCEDURE — 99285 EMERGENCY DEPT VISIT HI MDM: CPT | Mod: 25

## 2020-07-30 PROCEDURE — 99282 PR EMERGENCY DEPT VISIT,LEVEL II: ICD-10-PCS | Mod: 57,,, | Performed by: OBSTETRICS & GYNECOLOGY

## 2020-07-30 PROCEDURE — 88305 TISSUE EXAM BY PATHOLOGIST: CPT | Performed by: PATHOLOGY

## 2020-07-30 PROCEDURE — 25000003 PHARM REV CODE 250: Performed by: NURSE ANESTHETIST, CERTIFIED REGISTERED

## 2020-07-30 PROCEDURE — 88305 TISSUE EXAM BY PATHOLOGIST: CPT | Mod: 26,,, | Performed by: PATHOLOGY

## 2020-07-30 PROCEDURE — 96374 THER/PROPH/DIAG INJ IV PUSH: CPT

## 2020-07-30 PROCEDURE — 01965 ANES INCOMPL/MISSED AB PX: CPT | Performed by: OBSTETRICS & GYNECOLOGY

## 2020-07-30 PROCEDURE — 63600175 PHARM REV CODE 636 W HCPCS: Performed by: EMERGENCY MEDICINE

## 2020-07-30 PROCEDURE — 80053 COMPREHEN METABOLIC PANEL: CPT

## 2020-07-30 PROCEDURE — 36415 COLL VENOUS BLD VENIPUNCTURE: CPT

## 2020-07-30 PROCEDURE — 37000008 HC ANESTHESIA 1ST 15 MINUTES: Performed by: OBSTETRICS & GYNECOLOGY

## 2020-07-30 PROCEDURE — 36000704 HC OR TIME LEV I 1ST 15 MIN: Performed by: OBSTETRICS & GYNECOLOGY

## 2020-07-30 PROCEDURE — 63600175 PHARM REV CODE 636 W HCPCS: Performed by: NURSE ANESTHETIST, CERTIFIED REGISTERED

## 2020-07-30 PROCEDURE — 59812 TREATMENT OF MISCARRIAGE: CPT | Mod: ,,, | Performed by: OBSTETRICS & GYNECOLOGY

## 2020-07-30 PROCEDURE — 36000705 HC OR TIME LEV I EA ADD 15 MIN: Performed by: OBSTETRICS & GYNECOLOGY

## 2020-07-30 PROCEDURE — 86850 RBC ANTIBODY SCREEN: CPT

## 2020-07-30 PROCEDURE — 99282 EMERGENCY DEPT VISIT SF MDM: CPT | Mod: 57,,, | Performed by: OBSTETRICS & GYNECOLOGY

## 2020-07-30 PROCEDURE — 59812 PR SURG RX INCOMPLETE ABORTN: ICD-10-PCS | Mod: ,,, | Performed by: OBSTETRICS & GYNECOLOGY

## 2020-07-30 PROCEDURE — U0002 COVID-19 LAB TEST NON-CDC: HCPCS

## 2020-07-30 PROCEDURE — 85025 COMPLETE CBC W/AUTO DIFF WBC: CPT

## 2020-07-30 PROCEDURE — 84702 CHORIONIC GONADOTROPIN TEST: CPT

## 2020-07-30 RX ORDER — ONDANSETRON 2 MG/ML
INJECTION INTRAMUSCULAR; INTRAVENOUS
Status: DISCONTINUED | OUTPATIENT
Start: 2020-07-30 | End: 2020-07-30

## 2020-07-30 RX ORDER — KETAMINE HYDROCHLORIDE 50 MG/ML
INJECTION, SOLUTION INTRAMUSCULAR; INTRAVENOUS
Status: DISCONTINUED | OUTPATIENT
Start: 2020-07-30 | End: 2020-07-30

## 2020-07-30 RX ORDER — MORPHINE SULFATE 4 MG/ML
2 INJECTION, SOLUTION INTRAMUSCULAR; INTRAVENOUS
Status: COMPLETED | OUTPATIENT
Start: 2020-07-30 | End: 2020-07-30

## 2020-07-30 RX ORDER — HYDROCODONE BITARTRATE AND ACETAMINOPHEN 5; 325 MG/1; MG/1
1 TABLET ORAL EVERY 6 HOURS PRN
Qty: 15 TABLET | Refills: 0 | Status: ON HOLD | OUTPATIENT
Start: 2020-07-30 | End: 2020-08-09 | Stop reason: HOSPADM

## 2020-07-30 RX ORDER — PROPOFOL 10 MG/ML
VIAL (ML) INTRAVENOUS CONTINUOUS PRN
Status: DISCONTINUED | OUTPATIENT
Start: 2020-07-30 | End: 2020-07-30

## 2020-07-30 RX ORDER — FERROUS SULFATE 325(65) MG
325 TABLET ORAL 2 TIMES DAILY
Qty: 60 TABLET | Refills: 3 | Status: SHIPPED | OUTPATIENT
Start: 2020-07-30

## 2020-07-30 RX ORDER — MIDAZOLAM HYDROCHLORIDE 1 MG/ML
INJECTION INTRAMUSCULAR; INTRAVENOUS
Status: DISCONTINUED | OUTPATIENT
Start: 2020-07-30 | End: 2020-07-30

## 2020-07-30 RX ORDER — SODIUM CHLORIDE, SODIUM LACTATE, POTASSIUM CHLORIDE, CALCIUM CHLORIDE 600; 310; 30; 20 MG/100ML; MG/100ML; MG/100ML; MG/100ML
INJECTION, SOLUTION INTRAVENOUS CONTINUOUS
Status: DISCONTINUED | OUTPATIENT
Start: 2020-07-30 | End: 2020-07-30 | Stop reason: HOSPADM

## 2020-07-30 RX ORDER — FENTANYL CITRATE 50 UG/ML
INJECTION, SOLUTION INTRAMUSCULAR; INTRAVENOUS
Status: DISCONTINUED | OUTPATIENT
Start: 2020-07-30 | End: 2020-07-30

## 2020-07-30 RX ORDER — IBUPROFEN 600 MG/1
600 TABLET ORAL EVERY 8 HOURS PRN
Qty: 30 TABLET | Refills: 0 | Status: ON HOLD | OUTPATIENT
Start: 2020-07-30 | End: 2020-08-09 | Stop reason: HOSPADM

## 2020-07-30 RX ORDER — FENTANYL CITRATE 50 UG/ML
25 INJECTION, SOLUTION INTRAMUSCULAR; INTRAVENOUS EVERY 5 MIN PRN
Status: DISCONTINUED | OUTPATIENT
Start: 2020-07-30 | End: 2020-07-30 | Stop reason: HOSPADM

## 2020-07-30 RX ORDER — PROPOFOL 10 MG/ML
VIAL (ML) INTRAVENOUS
Status: DISCONTINUED | OUTPATIENT
Start: 2020-07-30 | End: 2020-07-30

## 2020-07-30 RX ORDER — ONDANSETRON 2 MG/ML
4 INJECTION INTRAMUSCULAR; INTRAVENOUS DAILY PRN
Status: DISCONTINUED | OUTPATIENT
Start: 2020-07-30 | End: 2020-07-30 | Stop reason: HOSPADM

## 2020-07-30 RX ADMIN — KETAMINE HYDROCHLORIDE 20 MG: 50 INJECTION INTRAMUSCULAR; INTRAVENOUS at 06:07

## 2020-07-30 RX ADMIN — FENTANYL CITRATE 100 MCG: 50 INJECTION, SOLUTION INTRAMUSCULAR; INTRAVENOUS at 06:07

## 2020-07-30 RX ADMIN — MIDAZOLAM HYDROCHLORIDE 2 MG: 1 INJECTION, SOLUTION INTRAMUSCULAR; INTRAVENOUS at 05:07

## 2020-07-30 RX ADMIN — PROPOFOL 100 MG: 10 INJECTION, EMULSION INTRAVENOUS at 06:07

## 2020-07-30 RX ADMIN — PROPOFOL 70 MCG/KG/MIN: 10 INJECTION, EMULSION INTRAVENOUS at 06:07

## 2020-07-30 RX ADMIN — MORPHINE SULFATE 2 MG: 4 INJECTION INTRAVENOUS at 12:07

## 2020-07-30 RX ADMIN — ONDANSETRON 4 MG: 2 INJECTION, SOLUTION INTRAMUSCULAR; INTRAVENOUS at 05:07

## 2020-07-30 NOTE — OP NOTE
Operative Note       SURGERY DATE:  2020     PRE-OP DIAGNOSIS:  Incomplete  [O03.4]    POST-OP DIAGNOSIS:  Incomplete  [O03.4]    Procedure(s) (LRB):  DILATION AND CURETTAGE, UTERUS, USING SUCTION (N/A)    Surgeon(s) and Role:     * Violetta Pak MD - Primary    ASSISTANT:  None    TASKS PERFORMED BY ASSISTANT:  No assistant    ANESTHESIA: General/MAC    FINDINGS: uterus 10 week size.  Cervix already 1.5cm dilated.  Moderate amount of products of conception removed.  Endometrial cavity with gritty texture at the end of the procedure    GRAFTS/IMPLANTS:  None    ESTIMATED BLOOD LOSS: 50 mL              COMPLICATIONS:  None    SPECIMEN:  products of conception for permanent section, and products of conception for genetic analysis    DESCRIPTION OF PROCEDURE:    The patient was taken to the Operating Room where general        endotracheal anesthesia was induced and found to be adequate.  Pre-op antibiotics were given.     Her perineum was then prepped and draped in normal sterile fashion, and      bladder was drained in an in and out fashion with the red rubber             catheter.  Time out was performed.                                                                     A weighted sterile speculum was     then placed in the patient's vagina, and the anterior lip of the cervix      was grasped with single-tooth tenaculum.  The uterine cervix was already dilated at 1.5cm.  A 12mm suction curette was     then advanced into the uterus and suction was applied.  Several passes       were  made and products of conception were removed.  Sharp      curettage was then performed.    Repeated suction curettage was then performed until it felt        that  all products of conception were removed.  Sharp curettage was then           performed again, and a gritty texture was noted on all 4 walls of the uterus.       The patient had no bleeding at the end of the case.    The single-tooth tenaculum was  removed from the anterior lip of the cervix and excellent hemostasis was noted.  Repeat bimanual exam revealed the uterus to be about 8 week size and firm.   Sponge, lap and needle counts were correct  X 2.  She will go to Recovery in stable condition.               CONDITION: Good    DISPOSITION: PACU - hemodynamically stable.

## 2020-07-30 NOTE — ASSESSMENT & PLAN NOTE
Discussed sending tissue for genetic analysis, and she would like to do that.  Will complete remainder of work-up outpatient.

## 2020-07-30 NOTE — ANESTHESIA PREPROCEDURE EVALUATION
2020  Lise Qureshi is a 24 y.o., female.    Anesthesia Evaluation    I have reviewed the Patient Summary Reports.    I have reviewed the Nursing Notes. I have reviewed the NPO Status.   I have reviewed the Medications.     Review of Systems  Anesthesia Hx:  No problems with previous Anesthesia Denies Hx of Anesthetic complications  Denies Family Hx of Anesthesia complications.   Denies Personal Hx of Anesthesia complications.   Social:  Non-Smoker, No Alcohol Use    Cardiovascular:  Cardiovascular Normal  ECG has been reviewed.    Pulmonary:  Pulmonary Normal    Renal/:  Renal/ Normal     Hepatic/GI:  Hepatic/GI Normal    Neurological:  Neurology Normal    Endocrine:  Endocrine Normal    Psych:  Psychiatric Normal         Patient Active Problem List   Diagnosis    Acanthosis nigricans    Excessive hair on females    Obesity    Anemia    Incomplete     Recurrent pregnancy loss     No current facility-administered medications on file prior to encounter.      No current outpatient medications on file prior to encounter.     Past Surgical History:   Procedure Laterality Date     SECTION      x1    DILATION AND CURETTAGE OF UTERUS      x2    DILATION AND CURETTAGE OF UTERUS USING SUCTION N/A 3/2/2020    Procedure: DILATION AND CURETTAGE, UTERUS, USING SUCTION;  Surgeon: Sherlyn Jesus MD;  Location: [facility];  Service: OB/GYN;  Laterality: N/A;         Physical Exam  General:  Well nourished, Morbid Obesity    Airway/Jaw/Neck:  Airway Findings: Mouth Opening: Normal Tongue: Normal  General Airway Assessment: Adult  Mallampati: II  TM Distance: 4 - 6 cm  Jaw/Neck Findings:  Neck ROM: Normal ROM      Dental:  Dental Findings: In tact   Chest/Lungs:  Chest/Lungs Findings: Clear to auscultation, Normal Respiratory Rate     Heart/Vascular:  Heart Findings: Rate:  Normal  Rhythm: Regular Rhythm  Sounds: Normal        Mental Status:  Mental Status Findings:  Cooperative, Alert and Oriented         Anesthesia Plan  Type of Anesthesia, risks & benefits discussed:  Anesthesia Type:  general  Patient's Preference:   Intra-op Monitoring Plan: standard ASA monitors  Intra-op Monitoring Plan Comments:   Post Op Pain Control Plan: per primary service following discharge from PACU  Post Op Pain Control Plan Comments:   Induction:   IV  Beta Blocker:  Patient is not currently on a Beta-Blocker (No further documentation required).       Informed Consent: Patient understands risks and agrees with Anesthesia plan.  Questions answered. Anesthesia consent signed with patient.  ASA Score: 2  emergent   Day of Surgery Review of History & Physical: I have interviewed and examined the patient. I have reviewed the patient's H&P dated:  There are no significant changes.  H&P update referred to the surgeon.         Ready For Surgery From Anesthesia Perspective.

## 2020-07-30 NOTE — SUBJECTIVE & OBJECTIVE
OB History    Para Term  AB Living   5 0 0 0 3 1   SAB TAB Ectopic Multiple Live Births   3 0 0 0 1      # Outcome Date GA Lbr Marko/2nd Weight Sex Delivery Anes PTL Lv   5 Current            4 SAB 20 10w4d          3 2019           2 2018           1  07/22/15    F CS-Unspec   KEE     Past Medical History:   Diagnosis Date    Acanthosis nigricans 3/27/2014    Anemia     Anxiety     Miscarriage within last 12 months     Obesity      Past Surgical History:   Procedure Laterality Date     SECTION      x1    DILATION AND CURETTAGE OF UTERUS      x2    DILATION AND CURETTAGE OF UTERUS USING SUCTION N/A 3/2/2020    Procedure: DILATION AND CURETTAGE, UTERUS, USING SUCTION;  Surgeon: Sherlyn Jesus MD;  [facility]  Service: OB/GYN;  Laterality: N/A;       (Not in a hospital admission)      Review of patient's allergies indicates:   Allergen Reactions    Penicillins Hives    Vancomycin analogues         Family History     None        Tobacco Use    Smoking status: Former Smoker    Smokeless tobacco: Never Used   Substance and Sexual Activity    Alcohol use: No    Drug use: No    Sexual activity: Yes     Partners: Male     Review of Systems   Constitutional: Negative for activity change, fatigue, fever and unexpected weight change.   Gastrointestinal: Positive for abdominal pain. Negative for bloating, constipation, diarrhea, nausea and vomiting.   Endocrine: Negative for hair loss and hot flashes.   Genitourinary: Positive for pelvic pain and vaginal bleeding (heavy, passing large clots and tissue). Negative for dysmenorrhea, dyspareunia, dysuria, frequency, genital sores, hematuria, urgency, vaginal discharge, vaginal pain, postcoital bleeding and vaginal odor.   Integumentary:  Negative for rash, hair changes, breast mass, nipple discharge and breast skin changes.   Hematological: Negative for adenopathy.   Breast: Negative for mass,  mastodynia, nipple discharge and skin changes     Objective:     Vital Signs (Most Recent):  Temp: 98.9 °F (37.2 °C) (07/30/20 1121)  Pulse: 83 (07/30/20 1615)  Resp: 16 (07/30/20 1615)  BP: (!) 142/76 (07/30/20 1615)  SpO2: 100 % (07/30/20 1615) Vital Signs (24h Range):  Temp:  [98.9 °F (37.2 °C)] 98.9 °F (37.2 °C)  Pulse:  [] 83  Resp:  [16-20] 16  SpO2:  [98 %-100 %] 100 %  BP: (121-148)/(60-95) 142/76     Weight: 136 kg (299 lb 13.2 oz)  Body mass index is 45.59 kg/m².        Physical Exam:   Constitutional: She is oriented to person, place, and time. She appears well-developed and well-nourished. She appears distressed (appears to be in pain).       Cardiovascular: Normal rate and regular rhythm.     Pulmonary/Chest: No respiratory distress.        Abdominal: Soft. She exhibits no distension and no mass. There is no abdominal tenderness. There is no rebound and no guarding. Hernia confirmed negative in the right inguinal area and confirmed negative in the left inguinal area.     Genitourinary:    Pelvic exam was performed with patient supine.   There is no rash, tenderness, lesion or injury on the right labia. There is no rash, tenderness, lesion or injury on the left labia. Uterus is not deviated, not enlarged, not fixed, not tender and not experiencing uterine prolapse. Right adnexum displays no mass, no tenderness and no fullness. Left adnexum displays no mass, no tenderness and no fullness. There is bleeding in the vagina. No erythema, tenderness, rectocele, cystocele or unspecified prolapse of vaginal walls in the vagina.    No foreign body in the vagina.      No signs of injury in the vagina.   Cervix exhibits no motion tenderness, no discharge and no friability.    Genitourinary Comments: Large clot (200cc) evacuated from the vagina with a small piece of tissue.  Cervix is about 2cm dilated with palpable tissue in the lower uterine segment.  Unable to remove in the ED.  Still having bleeding    negative for vaginal discharge       Uterus Size: 6 cm       Neurological: She is alert and oriented to person, place, and time.     Psychiatric: She has a normal mood and affect.            Significant Labs:  Lab Results   Component Value Date    GROUPTRH O POS 07/30/2020       Recent Lab Results       07/30/20  1231        Albumin 3.2     Alkaline Phosphatase 79     ALT 15     Anion Gap 9     AST 15     Baso # 0.02     Basophil% 0.2     BILIRUBIN TOTAL 0.3  Comment:  For infants and newborns, interpretation of results should be based  on gestational age, weight and in agreement with clinical  observations.  Premature Infant recommended reference ranges:  Up to 24 hours.............<8.0 mg/dL  Up to 48 hours............<12.0 mg/dL  3-5 days..................<15.0 mg/dL  6-29 days.................<15.0 mg/dL       BUN, Bld 10     Calcium 9.1     Chloride 104     CO2 22     Creatinine 0.7     Differential Method Automated     eGFR if  >60     eGFR if non  >60  Comment:  Calculation used to obtain the estimated glomerular filtration  rate (eGFR) is the CKD-EPI equation.        Eos # 0.0     Eosinophil% 0.1     Glucose 112     Gran # (ANC) 6.6     Gran% 77.0     Group & Rh O POS     hCG Quant [id number]  Comment:  Quantitative HCG Reference Ranges:  Males........................<5.0 mIU/mL  Non-Pregnant Females.........<5.0 mIU/mL  Pregnancy:  Weeks post-LMP...............Range (mIU/mL)  1-10  weeks.....................202-231,000  11-15 weeks..................22,536-234,990  16-22 weeks...................8,007-50,064  23-40 weeks...................1,959-79,088  NOTE:  This assay is not FDA approved for tumor screening,   diagnosis, or monitoring.       Hematocrit 34.5     Hemoglobin 9.7     Immature Grans (Abs) 0.03  Comment:  Mild elevation in immature granulocytes is non specific and   can be seen in a variety of conditions including stress response,   acute inflammation, trauma and  pregnancy. Correlation with other   laboratory and clinical findings is essential.       Immature Granulocytes 0.4     INDIRECT KULDEEP NEG     Lymph # 1.3     Lymph% 15.6     MCH 20.8     MCHC 28.1     MCV 74     Mono # 0.6     Mono% 6.7     MPV 10.0     nRBC 0     Platelets 397     Potassium 3.8     PROTEIN TOTAL 7.1     RBC 4.66     RDW 19.9     Sodium 135     WBC 8.51         Ultrasound reviewed

## 2020-07-30 NOTE — ASSESSMENT & PLAN NOTE
Patient still with active bleeding and tissue present in the lower uterine segment.  Recommend D&C.  Consents were reviewed in detail and signed.  All questions answered.  Proceed with suction D&C.

## 2020-07-30 NOTE — HPI
Presents to the hospital today with a few hours of heavy vaginal bleeding and pelvic pain.  Patient was unaware she was pregnant.  She denies any fever or foul smelling discharge.  OB hx significant for C/S at term followed by 3 first trimester SAB's.  She underwent a suction D&C in March for septic .  Patient is frustrated about the recurrent miscarriages, and would like to initiate a work-up if possible.

## 2020-07-30 NOTE — ED NOTES
MD assisted in bedside pelvic exam. Bleeding noted from vagina and patient reports lower abdominal cramping. Bed pads replaced; warm blankets replaced after exam. Pt is in NAD. Bed replaced back in lowest and locked position, call bell at bedside and patient reassured to call if she is in need of anything.

## 2020-07-30 NOTE — BRIEF OP NOTE
[facility] -   Brief Operative Note    Surgery Date: 2020     Surgeon(s) and Role:     * Violetta Pak MD - Primary    Assisting Surgeon: None    Pre-op Diagnosis:  Incomplete  [O03.4]    Post-op Diagnosis:  Post-Op Diagnosis Codes:     * Incomplete  [O03.4]    Procedure(s) (LRB):  DILATION AND CURETTAGE, UTERUS, USING SUCTION (N/A)    Anesthesia: General/MAC    Description of the findings of the procedure(s): uterus 10 week size.  Cervix already 1.5cm dilated.  Moderate amount of products of conception removed.  Endometrial cavity with gritty texture at the end of the procedure    Estimated Blood Loss: * 50 mL*         Specimens:   1.  Products of conception for permanent section  2.  Products of conception sent fresh for genetic analysis      Discharge Note    OUTCOME: Patient tolerated treatment/procedure well without complication and is now ready for discharge.    DISPOSITION: Home or Self Care    FINAL DIAGNOSIS:  Incomplete     FOLLOWUP: In clinic    DISCHARGE INSTRUCTIONS:    Discharge Procedure Orders   Diet Adult Regular     No dressing needed     Pelvic Rest     Notify your health care provider if you experience any of the following:  temperature >100.4     Notify your health care provider if you experience any of the following:  persistent nausea and vomiting or diarrhea     Notify your health care provider if you experience any of the following:  severe uncontrolled pain     Notify your health care provider if you experience any of the following:  redness, tenderness, or signs of infection (pain, swelling, redness, odor or green/yellow discharge around incision site)     Notify your health care provider if you experience any of the following:  difficulty breathing or increased cough     Notify your health care provider if you experience any of the following:  severe persistent headache     Notify your health care provider if you experience any of the  following:  worsening rash     Notify your health care provider if you experience any of the following:  persistent dizziness, light-headedness, or visual disturbances     Notify your health care provider if you experience any of the following:  increased confusion or weakness

## 2020-07-30 NOTE — H&P
[facility]  Obstetrics  History & Physical    Patient Name: Lise Qureshi  MRN: [id number]  Admission Date: 2020  Primary Care Provider: Primary Doctor No    Subjective:     Principal Problem:Incomplete     History of Present Illness:  Presents to the hospital today with a few hours of heavy vaginal bleeding and pelvic pain.  Patient was unaware she was pregnant.  She denies any fever or foul smelling discharge.  OB hx significant for C/S at term followed by 3 first trimester SAB's.  She underwent a suction D&C in March for septic .  Patient is frustrated about the recurrent miscarriages, and would like to initiate a work-up if possible.        OB History    Para Term  AB Living   5 0 0 0 3 1   SAB TAB Ectopic Multiple Live Births   3 0 0 0 1      # Outcome Date GA Lbr Marko/2nd Weight Sex Delivery Anes PTL Lv   5 Current            4 SAB 20 10w4d          3 2019           2 2018           1  07/22/15    F CS-Unspec   KEE     Past Medical History:   Diagnosis Date    Acanthosis nigricans 3/27/2014    Anemia     Anxiety     Miscarriage within last 12 months     Obesity      Past Surgical History:   Procedure Laterality Date     SECTION      x1    DILATION AND CURETTAGE OF UTERUS      x2    DILATION AND CURETTAGE OF UTERUS USING SUCTION N/A 3/2/2020    Procedure: DILATION AND CURETTAGE, UTERUS, USING SUCTION;  Surgeon: Sherlyn Jesus MD;  Location: [facility];  Service: OB/GYN;  Laterality: N/A;       (Not in a hospital admission)      Review of patient's allergies indicates:   Allergen Reactions    Penicillins Hives    Vancomycin analogues         Family History     None        Tobacco Use    Smoking status: Former Smoker    Smokeless tobacco: Never Used   Substance and Sexual Activity    Alcohol use: No    Drug use: No    Sexual activity: Yes     Partners: Male     Review of Systems   Constitutional:  Negative for activity change, fatigue, fever and unexpected weight change.   Gastrointestinal: Positive for abdominal pain. Negative for bloating, constipation, diarrhea, nausea and vomiting.   Endocrine: Negative for hair loss and hot flashes.   Genitourinary: Positive for pelvic pain and vaginal bleeding (heavy, passing large clots and tissue). Negative for dysmenorrhea, dyspareunia, dysuria, frequency, genital sores, hematuria, urgency, vaginal discharge, vaginal pain, postcoital bleeding and vaginal odor.   Integumentary:  Negative for rash, hair changes, breast mass, nipple discharge and breast skin changes.   Hematological: Negative for adenopathy.   Breast: Negative for mass, mastodynia, nipple discharge and skin changes     Objective:     Vital Signs (Most Recent):  Temp: 98.9 °F (37.2 °C) (07/30/20 1121)  Pulse: 83 (07/30/20 1615)  Resp: 16 (07/30/20 1615)  BP: (!) 142/76 (07/30/20 1615)  SpO2: 100 % (07/30/20 1615) Vital Signs (24h Range):  Temp:  [98.9 °F (37.2 °C)] 98.9 °F (37.2 °C)  Pulse:  [] 83  Resp:  [16-20] 16  SpO2:  [98 %-100 %] 100 %  BP: (121-148)/(60-95) 142/76     Weight: 136 kg (299 lb 13.2 oz)  Body mass index is 45.59 kg/m².        Physical Exam:   Constitutional: She is oriented to person, place, and time. She appears well-developed and well-nourished. She appears distressed (appears to be in pain).       Cardiovascular: Normal rate and regular rhythm.     Pulmonary/Chest: No respiratory distress.        Abdominal: Soft. She exhibits no distension and no mass. There is no abdominal tenderness. There is no rebound and no guarding. Hernia confirmed negative in the right inguinal area and confirmed negative in the left inguinal area.     Genitourinary:    Pelvic exam was performed with patient supine.   There is no rash, tenderness, lesion or injury on the right labia. There is no rash, tenderness, lesion or injury on the left labia. Uterus is not deviated, not enlarged, not fixed, not  tender and not experiencing uterine prolapse. Right adnexum displays no mass, no tenderness and no fullness. Left adnexum displays no mass, no tenderness and no fullness. There is bleeding in the vagina. No erythema, tenderness, rectocele, cystocele or unspecified prolapse of vaginal walls in the vagina.    No foreign body in the vagina.      No signs of injury in the vagina.   Cervix exhibits no motion tenderness, no discharge and no friability.    Genitourinary Comments: Large clot (200cc) evacuated from the vagina with a small piece of tissue.  Cervix is about 2cm dilated with palpable tissue in the lower uterine segment.  Unable to remove in the ED.  Still having bleeding   negative for vaginal discharge       Uterus Size: 6 cm       Neurological: She is alert and oriented to person, place, and time.     Psychiatric: She has a normal mood and affect.            Significant Labs:  Lab Results   Component Value Date    GROUPTRH O POS 07/30/2020       Recent Lab Results       07/30/20  1231        Albumin 3.2     Alkaline Phosphatase 79     ALT 15     Anion Gap 9     AST 15     Baso # 0.02     Basophil% 0.2     BILIRUBIN TOTAL 0.3  Comment:  For infants and newborns, interpretation of results should be based  on gestational age, weight and in agreement with clinical  observations.  Premature Infant recommended reference ranges:  Up to 24 hours.............<8.0 mg/dL  Up to 48 hours............<12.0 mg/dL  3-5 days..................<15.0 mg/dL  6-29 days.................<15.0 mg/dL       BUN, Bld 10     Calcium 9.1     Chloride 104     CO2 22     Creatinine 0.7     Differential Method Automated     eGFR if  >60     eGFR if non  >60  Comment:  Calculation used to obtain the estimated glomerular filtration  rate (eGFR) is the CKD-EPI equation.        Eos # 0.0     Eosinophil% 0.1     Glucose 112     Gran # (ANC) 6.6     Gran% 77.0     Group & Rh O POS     hCG Quant  [id number]  Comment:  Quantitative HCG Reference Ranges:  Males........................<5.0 mIU/mL  Non-Pregnant Females.........<5.0 mIU/mL  Pregnancy:  Weeks post-LMP...............Range (mIU/mL)  1-10  weeks.....................202-231,000  11-15 weeks..................22,536-234,990  16-22 weeks...................8,007-50,064  23-40 weeks...................1,600-49,413  NOTE:  This assay is not FDA approved for tumor screening,   diagnosis, or monitoring.       Hematocrit 34.5     Hemoglobin 9.7     Immature Grans (Abs) 0.03  Comment:  Mild elevation in immature granulocytes is non specific and   can be seen in a variety of conditions including stress response,   acute inflammation, trauma and pregnancy. Correlation with other   laboratory and clinical findings is essential.       Immature Granulocytes 0.4     INDIRECT KULDEEP NEG     Lymph # 1.3     Lymph% 15.6     MCH 20.8     MCHC 28.1     MCV 74     Mono # 0.6     Mono% 6.7     MPV 10.0     nRBC 0     Platelets 397     Potassium 3.8     PROTEIN TOTAL 7.1     RBC 4.66     RDW 19.9     Sodium 135     WBC 8.51         Ultrasound reviewed    Assessment/Plan:     24 y.o. female  at Unknown for:    * Incomplete   Patient still with active bleeding and tissue present in the lower uterine segment.  Recommend D&C.  Consents were reviewed in detail and signed.  All questions answered.  Proceed with suction D&C.    Recurrent pregnancy loss  Discussed sending tissue for genetic analysis, and she would like to do that.  Will complete remainder of work-up outpatient.    Anemia  Asymptomatic.  Proceed with suction D&C to prevent further excessive blood loss.  Iron supplementation at discharge.        Violetta Pak MD  Obstetrics  [facility]

## 2020-07-30 NOTE — ASSESSMENT & PLAN NOTE
Asymptomatic.  Proceed with suction D&C to prevent further excessive blood loss.  Iron supplementation at discharge.

## 2020-07-31 NOTE — ANESTHESIA POSTPROCEDURE EVALUATION
Anesthesia Post Evaluation    Patient: Lise Qureshi    Procedure(s) Performed: Procedure(s) (LRB):  DILATION AND CURETTAGE, UTERUS, USING SUCTION (N/A)    Final Anesthesia Type: MAC    Patient location during evaluation: PACU  Patient participation: Yes- Able to Participate  Level of consciousness: awake and alert, awake and oriented  Post-procedure vital signs: reviewed and stable  Pain management: adequate  Airway patency: patent  KEANU mitigation strategies: Multimodal analgesia  PONV status at discharge: No PONV  Anesthetic complications: no      Cardiovascular status: blood pressure returned to baseline  Respiratory status: unassisted  Hydration status: euvolemic  Follow-up not needed.          Vitals Value Taken Time   /65 07/30/20 1900   Temp 36.1 °C (97 °F) 07/30/20 1900   Pulse 75 07/30/20 1903   Resp 21 07/30/20 1903   SpO2 100 % 07/30/20 1903   Vitals shown include unvalidated device data.      Event Time   Out of Recovery 19:05:00         Pain/Karen Score: Pain Rating Prior to Med Admin: 9 (7/30/2020 12:36 PM)  Karen Score: 10 (7/30/2020  7:13 PM)

## 2020-07-31 NOTE — PLAN OF CARE
Discharge instructions discussed with patient. No family or friends present at hospital. Verbalized understanding.

## 2020-07-31 NOTE — TRANSFER OF CARE
"Anesthesia Transfer of Care Note    Patient: Lise Qureshi    Procedure(s) Performed: Procedure(s) (LRB):  DILATION AND CURETTAGE, UTERUS, USING SUCTION (N/A)    Patient location: PACU    Anesthesia Type: MAC    Transport from OR: Transported from OR on room air with adequate spontaneous ventilation    Post pain: adequate analgesia    Post assessment: no apparent anesthetic complications and tolerated procedure well    Post vital signs: stable    Level of consciousness: awake and alert    Nausea/Vomiting: no nausea/vomiting    Complications: none    Transfer of care protocol was followed      Last vitals:   Visit Vitals  /65   Pulse 74   Temp 36.1 °C (97 °F) (Temporal)   Resp 16   Ht 5' 8" (1.727 m)   Wt 136 kg (299 lb 13.2 oz)   LMP 12/15/2019   SpO2 100%   BMI 45.59 kg/m²     "

## 2020-08-04 LAB
FINAL PATHOLOGIC DIAGNOSIS: NORMAL
GROSS: NORMAL

## 2020-08-06 ENCOUNTER — HOSPITAL ENCOUNTER (INPATIENT)
Facility: HOSPITAL | Age: 25
LOS: 3 days | Discharge: HOME | DRG: 779 | End: 2020-08-09
Attending: STUDENT IN AN ORGANIZED HEALTH CARE EDUCATION/TRAINING PROGRAM | Admitting: STUDENT IN AN ORGANIZED HEALTH CARE EDUCATION/TRAINING PROGRAM
Payer: MEDICAID

## 2020-08-06 DIAGNOSIS — R52 BODY ACHES: Primary | ICD-10-CM

## 2020-08-06 DIAGNOSIS — R68.83 CHILLS: ICD-10-CM

## 2020-08-06 DIAGNOSIS — D50.8 IRON DEFICIENCY ANEMIA SECONDARY TO INADEQUATE DIETARY IRON INTAKE: ICD-10-CM

## 2020-08-06 PROBLEM — N71.0 ACUTE ENDOMETRITIS: Status: ACTIVE | Noted: 2020-08-06

## 2020-08-06 LAB
ALBUMIN SERPL BCP-MCNC: 3.4 G/DL (ref 3.5–5.2)
ALP SERPL-CCNC: 75 U/L (ref 55–135)
ALT SERPL W/O P-5'-P-CCNC: 18 U/L (ref 10–44)
ANION GAP SERPL CALC-SCNC: 10 MMOL/L (ref 8–16)
AST SERPL-CCNC: 22 U/L (ref 10–40)
BACTERIA #/AREA URNS HPF: ABNORMAL /HPF
BASOPHILS # BLD AUTO: 0.03 K/UL (ref 0–0.2)
BASOPHILS NFR BLD: 0.2 % (ref 0–1.9)
BILIRUB SERPL-MCNC: 0.4 MG/DL (ref 0.1–1)
BILIRUB UR QL STRIP: NEGATIVE
BUN SERPL-MCNC: 10 MG/DL (ref 6–20)
CALCIUM SERPL-MCNC: 9 MG/DL (ref 8.7–10.5)
CHLORIDE SERPL-SCNC: 107 MMOL/L (ref 95–110)
CLARITY UR: CLEAR
CO2 SERPL-SCNC: 22 MMOL/L (ref 23–29)
COLOR UR: YELLOW
CREAT SERPL-MCNC: 0.8 MG/DL (ref 0.5–1.4)
DIFFERENTIAL METHOD: ABNORMAL
EOSINOPHIL # BLD AUTO: 0.1 K/UL (ref 0–0.5)
EOSINOPHIL NFR BLD: 0.4 % (ref 0–8)
ERYTHROCYTE [DISTWIDTH] IN BLOOD BY AUTOMATED COUNT: 19.6 % (ref 11.5–14.5)
EST. GFR  (AFRICAN AMERICAN): >60 ML/MIN/1.73 M^2
EST. GFR  (NON AFRICAN AMERICAN): >60 ML/MIN/1.73 M^2
GLUCOSE SERPL-MCNC: 93 MG/DL (ref 70–110)
GLUCOSE UR QL STRIP: NEGATIVE
HCT VFR BLD AUTO: 28.3 % (ref 37–48.5)
HGB BLD-MCNC: 8.4 G/DL (ref 12–16)
HGB UR QL STRIP: ABNORMAL
IMM GRANULOCYTES # BLD AUTO: 0.09 K/UL (ref 0–0.04)
IMM GRANULOCYTES NFR BLD AUTO: 0.5 % (ref 0–0.5)
KETONES UR QL STRIP: ABNORMAL
LACTATE SERPL-SCNC: 1.3 MMOL/L (ref 0.5–2.2)
LACTATE SERPL-SCNC: 1.6 MMOL/L (ref 0.5–2.2)
LEUKOCYTE ESTERASE UR QL STRIP: ABNORMAL
LYMPHOCYTES # BLD AUTO: 0.3 K/UL (ref 1–4.8)
LYMPHOCYTES NFR BLD: 1.9 % (ref 18–48)
MCH RBC QN AUTO: 21.8 PG (ref 27–31)
MCHC RBC AUTO-ENTMCNC: 29.7 G/DL (ref 32–36)
MCV RBC AUTO: 73 FL (ref 82–98)
MICROSCOPIC COMMENT: ABNORMAL
MONOCYTES # BLD AUTO: 0.5 K/UL (ref 0.3–1)
MONOCYTES NFR BLD: 3.2 % (ref 4–15)
NEUTROPHILS # BLD AUTO: 15.6 K/UL (ref 1.8–7.7)
NEUTROPHILS NFR BLD: 93.8 % (ref 38–73)
NITRITE UR QL STRIP: NEGATIVE
NRBC BLD-RTO: 0 /100 WBC
OVALOCYTES BLD QL SMEAR: ABNORMAL
PH UR STRIP: 6 [PH] (ref 5–8)
PLATELET # BLD AUTO: 454 K/UL (ref 150–350)
PMV BLD AUTO: 9.6 FL (ref 9.2–12.9)
POIKILOCYTOSIS BLD QL SMEAR: SLIGHT
POTASSIUM SERPL-SCNC: 3.4 MMOL/L (ref 3.5–5.1)
PROT SERPL-MCNC: 7.1 G/DL (ref 6–8.4)
PROT UR QL STRIP: NEGATIVE
RBC # BLD AUTO: 3.86 M/UL (ref 4–5.4)
RBC #/AREA URNS HPF: 0 /HPF (ref 0–4)
SARS-COV-2 RDRP RESP QL NAA+PROBE: NEGATIVE
SODIUM SERPL-SCNC: 139 MMOL/L (ref 136–145)
SP GR UR STRIP: 1.02 (ref 1–1.03)
URN SPEC COLLECT METH UR: ABNORMAL
UROBILINOGEN UR STRIP-ACNC: NEGATIVE EU/DL
WBC # BLD AUTO: 16.63 K/UL (ref 3.9–12.7)
WBC #/AREA URNS HPF: >100 /HPF (ref 0–5)
WBC CLUMPS URNS QL MICRO: ABNORMAL

## 2020-08-06 PROCEDURE — 83605 ASSAY OF LACTIC ACID: CPT

## 2020-08-06 PROCEDURE — 63600175 PHARM REV CODE 636 W HCPCS: Performed by: STUDENT IN AN ORGANIZED HEALTH CARE EDUCATION/TRAINING PROGRAM

## 2020-08-06 PROCEDURE — 63600175 PHARM REV CODE 636 W HCPCS: Performed by: OBSTETRICS & GYNECOLOGY

## 2020-08-06 PROCEDURE — 99223 PR INITIAL HOSPITAL CARE,LEVL III: ICD-10-PCS | Mod: 24,,, | Performed by: OBSTETRICS & GYNECOLOGY

## 2020-08-06 PROCEDURE — 81000 URINALYSIS NONAUTO W/SCOPE: CPT

## 2020-08-06 PROCEDURE — 99285 EMERGENCY DEPT VISIT HI MDM: CPT | Mod: 25

## 2020-08-06 PROCEDURE — 25000003 PHARM REV CODE 250: Performed by: OBSTETRICS & GYNECOLOGY

## 2020-08-06 PROCEDURE — 80053 COMPREHEN METABOLIC PANEL: CPT

## 2020-08-06 PROCEDURE — 87040 BLOOD CULTURE FOR BACTERIA: CPT

## 2020-08-06 PROCEDURE — 87491 CHLMYD TRACH DNA AMP PROBE: CPT

## 2020-08-06 PROCEDURE — 96365 THER/PROPH/DIAG IV INF INIT: CPT

## 2020-08-06 PROCEDURE — 93010 ELECTROCARDIOGRAM REPORT: CPT | Mod: ,,, | Performed by: INTERNAL MEDICINE

## 2020-08-06 PROCEDURE — 99223 1ST HOSP IP/OBS HIGH 75: CPT | Mod: 24,,, | Performed by: OBSTETRICS & GYNECOLOGY

## 2020-08-06 PROCEDURE — U0002 COVID-19 LAB TEST NON-CDC: HCPCS

## 2020-08-06 PROCEDURE — 25000003 PHARM REV CODE 250: Performed by: STUDENT IN AN ORGANIZED HEALTH CARE EDUCATION/TRAINING PROGRAM

## 2020-08-06 PROCEDURE — 96361 HYDRATE IV INFUSION ADD-ON: CPT

## 2020-08-06 PROCEDURE — 11000001 HC ACUTE MED/SURG PRIVATE ROOM

## 2020-08-06 PROCEDURE — 93010 EKG 12-LEAD: ICD-10-PCS | Mod: ,,, | Performed by: INTERNAL MEDICINE

## 2020-08-06 PROCEDURE — 85025 COMPLETE CBC W/AUTO DIFF WBC: CPT

## 2020-08-06 PROCEDURE — 87086 URINE CULTURE/COLONY COUNT: CPT

## 2020-08-06 PROCEDURE — 93005 ELECTROCARDIOGRAM TRACING: CPT

## 2020-08-06 RX ORDER — SODIUM CHLORIDE 9 MG/ML
INJECTION, SOLUTION INTRAVENOUS CONTINUOUS
Status: DISCONTINUED | OUTPATIENT
Start: 2020-08-06 | End: 2020-08-08

## 2020-08-06 RX ORDER — HYDROCODONE BITARTRATE AND ACETAMINOPHEN 5; 325 MG/1; MG/1
1 TABLET ORAL EVERY 4 HOURS PRN
Status: DISCONTINUED | OUTPATIENT
Start: 2020-08-06 | End: 2020-08-09 | Stop reason: HOSPADM

## 2020-08-06 RX ORDER — IBUPROFEN 600 MG/1
600 TABLET ORAL EVERY 6 HOURS PRN
Status: DISCONTINUED | OUTPATIENT
Start: 2020-08-06 | End: 2020-08-06

## 2020-08-06 RX ORDER — SODIUM CHLORIDE 0.9 % (FLUSH) 0.9 %
10 SYRINGE (ML) INJECTION
Status: DISCONTINUED | OUTPATIENT
Start: 2020-08-06 | End: 2020-08-09 | Stop reason: HOSPADM

## 2020-08-06 RX ORDER — DOCUSATE SODIUM 100 MG/1
100 CAPSULE, LIQUID FILLED ORAL DAILY
Status: DISCONTINUED | OUTPATIENT
Start: 2020-08-07 | End: 2020-08-09 | Stop reason: HOSPADM

## 2020-08-06 RX ORDER — ONDANSETRON 8 MG/1
8 TABLET, ORALLY DISINTEGRATING ORAL EVERY 8 HOURS PRN
Status: DISCONTINUED | OUTPATIENT
Start: 2020-08-06 | End: 2020-08-09 | Stop reason: HOSPADM

## 2020-08-06 RX ORDER — CLINDAMYCIN PHOSPHATE 900 MG/50ML
900 INJECTION, SOLUTION INTRAVENOUS
Status: DISCONTINUED | OUTPATIENT
Start: 2020-08-06 | End: 2020-08-06

## 2020-08-06 RX ORDER — CLINDAMYCIN PHOSPHATE 900 MG/50ML
900 INJECTION, SOLUTION INTRAVENOUS
Status: DISCONTINUED | OUTPATIENT
Start: 2020-08-06 | End: 2020-08-09 | Stop reason: HOSPADM

## 2020-08-06 RX ORDER — IBUPROFEN 600 MG/1
600 TABLET ORAL EVERY 6 HOURS PRN
Status: DISCONTINUED | OUTPATIENT
Start: 2020-08-06 | End: 2020-08-09 | Stop reason: HOSPADM

## 2020-08-06 RX ORDER — ACETAMINOPHEN 325 MG/1
650 TABLET ORAL EVERY 6 HOURS PRN
Status: DISCONTINUED | OUTPATIENT
Start: 2020-08-06 | End: 2020-08-09 | Stop reason: HOSPADM

## 2020-08-06 RX ORDER — HYDROCODONE BITARTRATE AND ACETAMINOPHEN 10; 325 MG/1; MG/1
1 TABLET ORAL EVERY 4 HOURS PRN
Status: DISCONTINUED | OUTPATIENT
Start: 2020-08-06 | End: 2020-08-09

## 2020-08-06 RX ORDER — ENOXAPARIN SODIUM 100 MG/ML
40 INJECTION SUBCUTANEOUS EVERY 12 HOURS
Status: DISCONTINUED | OUTPATIENT
Start: 2020-08-06 | End: 2020-08-09 | Stop reason: HOSPADM

## 2020-08-06 RX ORDER — FERROUS SULFATE 325(65) MG
325 TABLET, DELAYED RELEASE (ENTERIC COATED) ORAL 2 TIMES DAILY
Status: DISCONTINUED | OUTPATIENT
Start: 2020-08-06 | End: 2020-08-09 | Stop reason: HOSPADM

## 2020-08-06 RX ORDER — CLINDAMYCIN PHOSPHATE 900 MG/50ML
900 INJECTION, SOLUTION INTRAVENOUS
Status: COMPLETED | OUTPATIENT
Start: 2020-08-06 | End: 2020-08-06

## 2020-08-06 RX ADMIN — CLINDAMYCIN IN 5 PERCENT DEXTROSE 900 MG: 18 INJECTION, SOLUTION INTRAVENOUS at 02:08

## 2020-08-06 RX ADMIN — SODIUM CHLORIDE 1917 ML: 0.9 INJECTION, SOLUTION INTRAVENOUS at 12:08

## 2020-08-06 RX ADMIN — ACETAMINOPHEN 650 MG: 325 TABLET ORAL at 05:08

## 2020-08-06 RX ADMIN — GENTAMICIN SULFATE 653.2 MG: 40 INJECTION, SOLUTION INTRAMUSCULAR; INTRAVENOUS at 04:08

## 2020-08-06 RX ADMIN — ENOXAPARIN SODIUM 40 MG: 40 INJECTION SUBCUTANEOUS at 08:08

## 2020-08-06 RX ADMIN — SODIUM CHLORIDE: 0.9 INJECTION, SOLUTION INTRAVENOUS at 07:08

## 2020-08-06 RX ADMIN — CLINDAMYCIN IN 5 PERCENT DEXTROSE 900 MG: 18 INJECTION, SOLUTION INTRAVENOUS at 07:08

## 2020-08-06 RX ADMIN — FERROUS SULFATE TAB EC 325 MG (65 MG FE EQUIVALENT) 325 MG: 325 (65 FE) TABLET DELAYED RESPONSE at 08:08

## 2020-08-06 RX ADMIN — HYDROCODONE BITARTRATE AND ACETAMINOPHEN 1 TABLET: 10; 325 TABLET ORAL at 08:08

## 2020-08-06 NOTE — HPI
23 y/o  presents to the ED on POD#7 s/p suction D&C for incomplete  with high fevers, body aches, and pelvic pain.  Her symptoms started early this morning, and progressively worsened through the day so she came in.  Has a headache.  No cough or SOB.  Has lower abdominal pain radiating into her back.  Bleeding from recent miscarriage has completely resolved, but she does note a discharge with odor.  Of note, back in March this year, she required suction D&C and hospital admission for septic .

## 2020-08-06 NOTE — ASSESSMENT & PLAN NOTE
Exam findings most consistent with endometritis.  No retained POC on ultrasound.  Clean catch UTI suspicious for possible UTI; however, this could be contaminant from vagina.  Cath urine culture collected.  Given foul smelling vaginal discharge and uterine tenderness, will treat for endometritis with IV gent and clinda.

## 2020-08-06 NOTE — ED NOTES
Patient placed on continuous cardiac monitor, automatic blood pressure cuff and continuous pulse oximeter.     Ladan Enriquez RN  08/06/20 [id number]

## 2020-08-06 NOTE — ED PROVIDER NOTES
SCRIBE #1 NOTE: I, Miguel Esqueda, am scribing for, and in the presence of, Jayy Hercules MD. I have scribed the entire note.       History     Chief Complaint   Patient presents with    Generalized Body Aches     pt reports having a dnc last week and now feels feverish with bodyaches that began this AM     Review of patient's allergies indicates:   Allergen Reactions    Penicillins Hives    Vancomycin analogues          History of Present Illness     HPI    2020, 12:11 PM   History obtained from the patient      History of Present Illness: Lise Qureshi is a 24 y.o. female patient with a PMHx of acanthosis nigricans, anemia, anxiety, and obesity who presents to the Emergency Department for evaluation of generalized body aches which onset gradually x 1 week ago. Pt notes having a D&C last Thursday and has been having chills, a subjective fever, CP, lower abd pain, white vaginal discharge, scant vaginal bleeding, and HA.     Symptoms are constant and moderate in severity. No mitigating or exacerbating factors reported. Patient denies any cough, SOB, N/V, difficulty urinating, urinary frequency, back pain, and weakness, and all other sxs at this time. No prior Tx reported. No further complaints or concerns at this time.        Arrival mode: Personal vehicle     PCP: Primary Doctor No        Past Medical History:  Past Medical History:   Diagnosis Date    Acanthosis nigricans 3/27/2014    Anemia     Anxiety     Miscarriage within last 12 months     Obesity        Past Surgical History:  Past Surgical History:   Procedure Laterality Date     SECTION      x1    DILATION AND CURETTAGE OF UTERUS      x2    DILATION AND CURETTAGE OF UTERUS USING SUCTION N/A 3/2/2020    Procedure: DILATION AND CURETTAGE, UTERUS, USING SUCTION;  Surgeon: Sherlyn Jesus MD;  Location: [facility];  Service: OB/GYN;  Laterality: N/A;    DILATION AND CURETTAGE OF UTERUS USING SUCTION N/A 2020     Procedure: DILATION AND CURETTAGE, UTERUS, USING SUCTION;  Surgeon: Violetta Pak MD;  Location: [facility] OR;  Service: OB/GYN;  Laterality: N/A;         Family History:  History reviewed. No pertinent family history.    Social History:  Social History     Tobacco Use    Smoking status: Former Smoker    Smokeless tobacco: Never Used   Substance and Sexual Activity    Alcohol use: No    Drug use: No    Sexual activity: Yes     Partners: Male        Review of Systems     Review of Systems   Constitutional: Positive for chills and fever (Subjective).   HENT: Negative for sore throat.    Respiratory: Negative for cough and shortness of breath.    Cardiovascular: Positive for chest pain.   Gastrointestinal: Positive for abdominal pain (lower). Negative for nausea and vomiting.   Genitourinary: Positive for vaginal bleeding and vaginal discharge (white). Negative for difficulty urinating, dysuria and frequency.   Musculoskeletal: Positive for myalgias (Generalized body aches). Negative for back pain.   Skin: Negative for rash.   Neurological: Positive for headaches. Negative for weakness.   Hematological: Does not bruise/bleed easily.   All other systems reviewed and are negative.       Physical Exam     Initial Vitals [08/06/20 1159]   BP Pulse Resp Temp SpO2   (!) 146/95 (!) 133 20 99.6 °F (37.6 °C) 100 %      MAP       --          Physical Exam  Nursing Notes and Vital Signs Reviewed.  Constitutional: Patient is in no acute distress. Well-developed and well-nourished.  Head: Atraumatic. Normocephalic.  Eyes: PERRL. EOM intact. Conjunctivae are not pale. No scleral icterus.  ENT: Mucous membranes are moist. Oropharynx is clear and symmetric.    Neck: Supple. Full ROM. No lymphadenopathy.  Cardiovascular: Tachycardic. Regular rhythm. No murmurs, rubs, or gallops. Distal pulses are 2+ and symmetric.  Pulmonary/Chest: No respiratory distress. Clear to auscultation bilaterally. No wheezing or rales.  Abdominal: Soft and  "non-distended.  There is lower abd tenderness.  No rebound, guarding, or rigidity. Good bowel sounds.  Genitourinary: No CVA tenderness  Pelvic: A female chaperone was present for this examination. Nl external inspection. No lesions or abnormalities were visible on the labia majora or minora. Cervical os is closed. There is no CMT. There is no blood in the vaginal vault. Liquid white discharge noted. No adnexal tenderness. No adnexal masses.   Musculoskeletal: Moves all extremities. No obvious deformities. No edema. No calf tenderness.  Skin: Warm and dry.  Neurological:  Alert, awake, and appropriate.  Normal speech.  No acute focal neurological deficits are appreciated.  Psychiatric: Normal affect. Good eye contact. Appropriate in content.     ED Course   Procedures  ED Vital Signs:  Vitals:    08/06/20 1159 08/06/20 1229 08/06/20 1305 08/06/20 1332   BP: (!) 146/95 129/60  104/63   Pulse: (!) 133 (!) 127 (!) 120    Resp: 20      Temp: 99.6 °F (37.6 °C)      TempSrc: Oral      SpO2: 100% 100%     Weight: (!) 137.3 kg (302 lb 11.1 oz)      Height: 5' 8" (1.727 m)       08/06/20 1342 08/06/20 1502 08/06/20 1504 08/06/20 1612   BP:  111/64  118/60   Pulse: (!) 118  (!) 112    Resp: (!) 22  (!) 21    Temp:       TempSrc:       SpO2: 100%  100%    Weight:       Height:        08/06/20 1613 08/06/20 1656 08/06/20 1711 08/06/20 1926   BP:   131/76 (!) 114/52   Pulse: (!) 116  (!) 113 110   Resp: 11  18 18   Temp:  (!) 100.4 °F (38 °C) 99.7 °F (37.6 °C) 98 °F (36.7 °C)   TempSrc:   Oral Oral   SpO2: 100%  100% 99%   Weight:       Height:        08/06/20 2027   BP:    Pulse:    Resp: 18   Temp:    TempSrc:    SpO2:    Weight:    Height:        Abnormal Lab Results:  Labs Reviewed   CBC W/ AUTO DIFFERENTIAL - Abnormal; Notable for the following components:       Result Value    WBC 16.63 (*)     RBC 3.86 (*)     Hemoglobin 8.4 (*)     Hematocrit 28.3 (*)     Mean Corpuscular Volume 73 (*)     Mean Corpuscular Hemoglobin " 21.8 (*)     Mean Corpuscular Hemoglobin Conc 29.7 (*)     RDW 19.6 (*)     Platelets 454 (*)     Gran # (ANC) 15.6 (*)     Immature Grans (Abs) 0.09 (*)     Lymph # 0.3 (*)     Gran% 93.8 (*)     Lymph% 1.9 (*)     Mono% 3.2 (*)     All other components within normal limits   COMPREHENSIVE METABOLIC PANEL - Abnormal; Notable for the following components:    Potassium 3.4 (*)     CO2 22 (*)     Albumin 3.4 (*)     All other components within normal limits   URINALYSIS, REFLEX TO URINE CULTURE - Abnormal; Notable for the following components:    Ketones, UA 1+ (*)     Occult Blood UA 3+ (*)     Leukocytes, UA 3+ (*)     All other components within normal limits    Narrative:     Specimen Source->Urine   URINALYSIS MICROSCOPIC - Abnormal; Notable for the following components:    WBC, UA >100 (*)     WBC Clumps, UA Many (*)     Bacteria Many (*)     All other components within normal limits    Narrative:     Specimen Source->Urine   CULTURE, URINE   CULTURE, BLOOD   CULTURE, BLOOD   CULTURE, URINE   C. TRACHOMATIS/N. GONORRHOEAE BY AMP DNA   LACTIC ACID, PLASMA   SARS-COV-2 RNA AMPLIFICATION, QUAL        All Lab Results:  Results for orders placed or performed during the hospital encounter of 08/06/20   CBC auto differential   Result Value Ref Range    WBC 16.63 (H) 3.90 - 12.70 K/uL    RBC 3.86 (L) 4.00 - 5.40 M/uL    Hemoglobin 8.4 (L) 12.0 - 16.0 g/dL    Hematocrit 28.3 (L) 37.0 - 48.5 %    Mean Corpuscular Volume 73 (L) 82 - 98 fL    Mean Corpuscular Hemoglobin 21.8 (L) 27.0 - 31.0 pg    Mean Corpuscular Hemoglobin Conc 29.7 (L) 32.0 - 36.0 g/dL    RDW 19.6 (H) 11.5 - 14.5 %    Platelets 454 (H) 150 - 350 K/uL    MPV 9.6 9.2 - 12.9 fL    Immature Granulocytes 0.5 0.0 - 0.5 %    Gran # (ANC) 15.6 (H) 1.8 - 7.7 K/uL    Immature Grans (Abs) 0.09 (H) 0.00 - 0.04 K/uL    Lymph # 0.3 (L) 1.0 - 4.8 K/uL    Mono # 0.5 0.3 - 1.0 K/uL    Eos # 0.1 0.0 - 0.5 K/uL    Baso # 0.03 0.00 - 0.20 K/uL    nRBC 0 0 /100 WBC    Gran%  93.8 (H) 38.0 - 73.0 %    Lymph% 1.9 (L) 18.0 - 48.0 %    Mono% 3.2 (L) 4.0 - 15.0 %    Eosinophil% 0.4 0.0 - 8.0 %    Basophil% 0.2 0.0 - 1.9 %    Poik Slight     Ovalocytes Occasional     Differential Method Automated    Comprehensive metabolic panel   Result Value Ref Range    Sodium 139 136 - 145 mmol/L    Potassium 3.4 (L) 3.5 - 5.1 mmol/L    Chloride 107 95 - 110 mmol/L    CO2 22 (L) 23 - 29 mmol/L    Glucose 93 70 - 110 mg/dL    BUN, Bld 10 6 - 20 mg/dL    Creatinine 0.8 0.5 - 1.4 mg/dL    Calcium 9.0 8.7 - 10.5 mg/dL    Total Protein 7.1 6.0 - 8.4 g/dL    Albumin 3.4 (L) 3.5 - 5.2 g/dL    Total Bilirubin 0.4 0.1 - 1.0 mg/dL    Alkaline Phosphatase 75 55 - 135 U/L    AST 22 10 - 40 U/L    ALT 18 10 - 44 U/L    Anion Gap 10 8 - 16 mmol/L    eGFR if African American >60 >60 mL/min/1.73 m^2    eGFR if non African American >60 >60 mL/min/1.73 m^2   Lactic acid, plasma #1   Result Value Ref Range    Lactate (Lactic Acid) 1.3 0.5 - 2.2 mmol/L   Urinalysis, Reflex to Urine Culture Urine, Clean Catch    Specimen: Urine   Result Value Ref Range    Specimen UA Urine, Clean Catch     Color, UA Yellow Yellow, Straw, Katherine    Appearance, UA Clear Clear    pH, UA 6.0 5.0 - 8.0    Specific Gravity, UA 1.025 1.005 - 1.030    Protein, UA Negative Negative    Glucose, UA Negative Negative    Ketones, UA 1+ (A) Negative    Bilirubin (UA) Negative Negative    Occult Blood UA 3+ (A) Negative    Nitrite, UA Negative Negative    Urobilinogen, UA Negative <2.0 EU/dL    Leukocytes, UA 3+ (A) Negative   COVID-19 Rapid Screening   Result Value Ref Range    SARS-CoV-2 RNA, Amplification, Qual Negative Negative   Urinalysis Microscopic   Result Value Ref Range    RBC, UA 0 0 - 4 /hpf    WBC, UA >100 (H) 0 - 5 /hpf    WBC Clumps, UA Many (A) None-Rare    Bacteria Many (A) None-Occ /hpf    Microscopic Comment SEE COMMENT    Lactic acid, plasma #2   Result Value Ref Range    Lactate (Lactic Acid) 1.6 0.5 - 2.2 mmol/L         Imaging  Results:  Imaging Results          US Pelvis Complete Non OB (Final result)  Result time 08/06/20 14:46:42   Procedure changed from US Pelvis Comp with Transvag NON-OB (xpd     Final result by Jeremias Horn Jr., MD (08/06/20 14:46:42)                 Impression:      No significant sonographic abnormality of the pelvis.      Electronically signed by: Jeremias Horn Jr., MD  Date:    08/06/2020  Time:    14:46             Narrative:    EXAMINATION:  US PELVIS COMPLETE NON OB    CLINICAL HISTORY:  Recent d&c, now with fever/chills... Concer for retained products;    TECHNIQUE:  Transabdominal sonography of the pelvis was performed.    COMPARISON:  None.    FINDINGS:  Uterus:    Size: 10.0 x 4.6 x 6.0 cm.    Masses: None.    Endometrium: Normal measuring 2 mm.    Right ovary:    Size: 3.0 x 1.2 x 1.6 cm.    Appearance: Normal.    Vascular flow: Normal.    Left ovary:    Size: 3.4 x 2.2 x 3.8 cm.    Appearance: Normal.    Vascular Flow: Normal.    Free Fluid:    None.                               X-Ray Chest AP Portable (Final result)  Result time 08/06/20 12:38:52    Final result by Jeremias Horn Jr., MD (08/06/20 12:38:52)                 Impression:      No acute findings.      Electronically signed by: Jeremias Horn Jr., MD  Date:    08/06/2020  Time:    12:38             Narrative:    EXAMINATION:  XR CHEST AP PORTABLE    CLINICAL HISTORY:  Sepsis;    COMPARISON:  Prior radiograph from June 16, 2020.    FINDINGS:  Lungs are clear.  No pleural fluid or pneumothorax.  Heart size within normal limits.  No significant bony findings.                                 The EKG was ordered, reviewed, and independently interpreted by the ED provider.  Interpretation time: 1305  Rate: 120 BPM  Rhythm: Sinus tachycardic with short NV  Interpretation: Nonspecific ST and T wave abnormality. No STEMI.           The Emergency Provider reviewed the vital signs and test results, which are outlined above.     ED Discussion     1:54  PM: Discussed pt's case with Dr. Pak (Ob/Gyn) who recommends a pelvic ultrasound to assess for any possible retained placenta, collect a chlamydia/gonorrhea swab, give a dose of clindamycin 900 mg IV, a dose of gentamycin 2 mg/kg IV, and a catheter urine culture.     2:58 PM: Discussed pt's case with Dr. Pak (Ob/Gyn) who recommends admitting for sepsis, suspected endometritis, and UTI. Also recommends doing clindamycin 900 mg q 8 hours, pharmacy to continue dosing gentamycin, and to add ampicillin 2g IV q 6 hours and that pt is able to have tylenol and ibuprofen as needed for pain.     2:58 PM: Discussed case with Violetta Pak MD (Ob/Gyn). Dr. Pak agrees with current care and management of pt and accepts admission.   Admitting Service: Hospital medicine  Admitting Physician: Dr. Pak  Admit to: Med/surg     3:11 PM: Discussed pt's case with Dr. Pak (Ob/Gyn) who recommends holding the ampicillin due to pt's allergy to penicillins.     3:15 PM: Re-evaluated pt. I have discussed test results, shared treatment plan, and the need for admission with patient at bedside. Pt express understanding at this time and agree with all information. All questions answered. Pt have no further questions or concerns at this time. Pt is ready for admit.     ED Medication(s):  Medications   acetaminophen tablet 650 mg (650 mg Oral Given 8/6/20 1700)   ibuprofen tablet 600 mg (has no administration in time range)   gentamicin (GARAMYCIN) 653.2 mg in sodium chloride 0.9% 100 mL IVPB (653.2 mg Intravenous Trough Due 30 minutes Before Dose 8/7/20 1530)   clindamycin in D5W 900 mg/50 mL IVPB 900 mg (900 mg Intravenous New Bag 8/6/20 1906)   ferrous sulfate EC tablet 325 mg (325 mg Oral Given 8/6/20 2027)   docusate sodium capsule 100 mg (has no administration in time range)   sodium chloride 0.9% flush 10 mL (has no administration in time range)   ondansetron disintegrating tablet 8 mg (has no administration in  time range)   promethazine (PHENERGAN) 12.5 mg in dextrose 5 % 50 mL IVPB (has no administration in time range)   0.9%  NaCl infusion ( Intravenous New Bag 8/6/20 1905)   enoxaparin injection 40 mg (40 mg Subcutaneous Given 8/6/20 2027)   HYDROcodone-acetaminophen 5-325 mg per tablet 1 tablet (has no administration in time range)   HYDROcodone-acetaminophen  mg per tablet 1 tablet (1 tablet Oral Given 8/6/20 2027)   sodium chloride 0.9% bolus 1,917 mL (0 mL/kg × 63.9 kg (Ideal) Intravenous Stopped 8/6/20 1446)   clindamycin in D5W 900 mg/50 mL IVPB 900 mg (0 mg Intravenous Stopped 8/6/20 1546)   gentamicin (GARAMYCIN) 653.2 mg in sodium chloride 0.9% 100 mL IVPB (0 mg/kg × 93.3 kg (Adjusted) Intravenous Stopped 8/6/20 1634)       Current Discharge Medication List                    Medical Decision Making:   History:   Old Records Summarized: records from previous admission(s).       <> Summary of Records: Reviewed records from previous D&C 6 days ago.  Differential Diagnosis:   Sepsis  Endometritis  Gonorrhea/chlamydia  Postprocedural pain  COVID-19  Independently Interpreted Test(s):   I have ordered and independently interpreted X-rays - see summary below.       <> Summary of X-Ray Reading(s): No acute findings  Clinical Tests:   Lab Tests: Ordered and Reviewed  Radiological Study: Ordered and Reviewed  Medical Tests: Ordered and Reviewed  ED Management:  24-year-old  female presents shortly after D&C with fever, chills, and body aches.  Given current climate regionally.  I ruled the patient out for COVID infection.  Over the much more likely scenario was an infection relating to her recent obstetrical procedure.  Obstetrics was consulted and recommendations were given as described above.  Patient to be admitted to the OB service for antibiotics and further evaluation.  Other:   I have discussed this case with another health care provider.       <> Summary of the Discussion: Consulted OB as  described above.             Scribe Attestation:   Scribe #1: I performed the above scribed service and the documentation accurately describes the services I performed. I attest to the accuracy of the note.     Attending:   Physician Attestation Statement for Scribe #1: I, Jayy Hercules MD, personally performed the services described in this documentation, as scribed by Miguel Esqueda, in my presence, and it is both accurate and complete.           Clinical Impression       ICD-10-CM ICD-9-CM   1. Body aches  R52 780.96   2. Sepsis after obstetrical procedure  O86.04 669.40     995.91   3. Chills  R68.83 780.64       Disposition:   Disposition: Admitted  Condition: Fair       Jayy Hercules MD  08/06/20 2040

## 2020-08-06 NOTE — H&P
[facility] -   Obstetrics & Gynecology  History & Physical    Patient Name: Lise Qureshi  MRN: [id number]  Admission Date: 2020  Primary Care Provider: Primary Doctor No    Subjective:     Chief Complaint/Reason for Admission: endometritis, sepsis    History of Present Illness:  25 y/o  presents to the ED on POD#7 s/p suction D&C for incomplete  with high fevers, body aches, and pelvic pain.  Her symptoms started early this morning, and progressively worsened through the day so she came in.  Has a headache.  No cough or SOB.  Has lower abdominal pain radiating into her back.  Bleeding from recent miscarriage has completely resolved, but she does note a discharge with odor.  Of note, back in March this year, she required suction D&C and hospital admission for septic .        OB History    Para Term  AB Living   5 0 0 0 3 1   SAB TAB Ectopic Multiple Live Births   3 0 0 0 1      # Outcome Date GA Lbr Marko/2nd Weight Sex Delivery Anes PTL Lv   5             4 SAB 20 10w4d          3 2019           2 2018           1  07/22/15    F CS-Unspec   KEE     Past Medical History:   Diagnosis Date    Acanthosis nigricans 3/27/2014    Anemia     Anxiety     Miscarriage within last 12 months     Obesity      Past Surgical History:   Procedure Laterality Date     SECTION      x1    DILATION AND CURETTAGE OF UTERUS      x2    DILATION AND CURETTAGE OF UTERUS USING SUCTION N/A 3/2/2020    Procedure: DILATION AND CURETTAGE, UTERUS, USING SUCTION;  Surgeon: Sherlyn Jesus MD;  Location: [facility] OR;  Service: OB/GYN;  Laterality: N/A;    DILATION AND CURETTAGE OF UTERUS USING SUCTION N/A 2020    Procedure: DILATION AND CURETTAGE, UTERUS, USING SUCTION;  Surgeon: Violetta Pak MD;  Location: [facility] OR;  Service: OB/GYN;  Laterality: N/A;       PTA Medications   Medication Sig    ibuprofen (ADVIL,MOTRIN) 600 MG  tablet Take 1 tablet (600 mg total) by mouth every 8 (eight) hours as needed for Pain.    ferrous sulfate (FEOSOL) 325 mg (65 mg iron) Tab tablet Take 1 tablet (325 mg total) by mouth 2 (two) times daily.    HYDROcodone-acetaminophen (NORCO) 5-325 mg per tablet Take 1 tablet by mouth every 6 (six) hours as needed for Pain.       Review of patient's allergies indicates:   Allergen Reactions    Penicillins Hives    Vancomycin analogues         Family History     None        Tobacco Use    Smoking status: Former Smoker    Smokeless tobacco: Never Used   Substance and Sexual Activity    Alcohol use: No    Drug use: No    Sexual activity: Yes     Partners: Male     Review of Systems   Constitutional: Positive for chills, fatigue and fever. Negative for activity change and unexpected weight change.   Respiratory: Negative for cough, shortness of breath and wheezing.    Cardiovascular: Negative for chest pain and palpitations.   Gastrointestinal: Positive for abdominal pain (lower abdomen). Negative for bloating, constipation, diarrhea, nausea and vomiting.   Endocrine: Negative for hair loss and hot flashes.   Genitourinary: Positive for pelvic pain and vaginal discharge. Negative for dysmenorrhea, dyspareunia, dysuria, frequency, genital sores, hematuria, menorrhagia, menstrual problem, urgency, vaginal bleeding, vaginal pain, postcoital bleeding and vaginal odor.   Musculoskeletal: Positive for back pain.   Integumentary:  Negative for rash, hair changes, breast mass, nipple discharge and breast skin changes.   Hematological: Negative for adenopathy.   Breast: Negative for mass, mastodynia, nipple discharge and skin changes     Objective:     Vital Signs (Most Recent):  Temp: 99.7 °F (37.6 °C) (08/06/20 1711)  Pulse: (!) 113 (08/06/20 1711)  Resp: 18 (08/06/20 1711)  BP: 131/76 (08/06/20 1711)  SpO2: 100 % (08/06/20 1711) Vital Signs (24h Range):  Temp:  [99.6 °F (37.6 °C)-100.4 °F (38 °C)] 99.7 °F (37.6  °C)  Pulse:  [112-133] 113  Resp:  [11-22] 18  SpO2:  [100 %] 100 %  BP: (104-146)/(60-95) 131/76     Weight: (!) 137.3 kg (302 lb 11.1 oz)  Body mass index is 46.02 kg/m².    Patient's last menstrual period was 12/15/2019.    Physical Exam:   Constitutional: She is oriented to person, place, and time. She appears well-developed and well-nourished. She appears distressed (appears ill).       Cardiovascular: Exam reveals no clubbing, no cyanosis and no edema.    tachycardic    Pulmonary/Chest: Effort normal. No respiratory distress. She has no wheezes. She has no rales.        Abdominal: Soft. She exhibits no distension and no mass. There is abdominal tenderness (suprapubic). There is no rebound and no guarding. Hernia confirmed negative in the right inguinal area and confirmed negative in the left inguinal area.     Genitourinary:    Pelvic exam was performed with patient supine.   There is no rash, tenderness, lesion or injury on the right labia. There is no rash, tenderness, lesion or injury on the left labia. Uterus is tender. Uterus is not deviated, not enlarged, not fixed and not experiencing uterine prolapse. Right adnexum displays no mass, no tenderness and no fullness. Left adnexum displays no mass, no tenderness and no fullness. No erythema, tenderness, bleeding, rectocele, cystocele or unspecified prolapse of vaginal walls in the vagina.    No foreign body in the vagina.      No signs of injury in the vagina.   Cervix exhibits motion tenderness and discharge. Cervix exhibits no friability. positive for vaginal discharge (yellow, foul smelling)       Uterus Size: 6 cm       Neurological: She is alert and oriented to person, place, and time.    Skin: No rash noted. No cyanosis or erythema. No pallor. Nails show no clubbing.    Psychiatric: She has a normal mood and affect.       Laboratory:  Recent Lab Results       08/06/20  1315   08/06/20  1240   08/06/20  1235   08/06/20  1227        Albumin       3.4      Alkaline Phosphatase       75     ALT       18     Anion Gap       10     Appearance, UA Clear           AST       22     Bacteria, UA Many           Baso #       0.03     Basophil%       0.2     Bilirubin (UA) Negative           BILIRUBIN TOTAL       0.4  Comment:  For infants and newborns, interpretation of results should be based  on gestational age, weight and in agreement with clinical  observations.  Premature Infant recommended reference ranges:  Up to 24 hours.............<8.0 mg/dL  Up to 48 hours............<12.0 mg/dL  3-5 days..................<15.0 mg/dL  6-29 days.................<15.0 mg/dL       BUN, Bld       10     Calcium       9.0     Chloride       107     CO2       22     Color, UA Yellow           Creatinine       0.8     Differential Method       Automated     eGFR if        >60     eGFR if non        >60  Comment:  Calculation used to obtain the estimated glomerular filtration  rate (eGFR) is the CKD-EPI equation.        Eos #       0.1     Eosinophil%       0.4     Glucose       93     Glucose, UA Negative           Gran # (ANC)       15.6     Gran%       93.8     Hematocrit       28.3     Hemoglobin       8.4     Immature Grans (Abs)       0.09  Comment:  Mild elevation in immature granulocytes is non specific and   can be seen in a variety of conditions including stress response,   acute inflammation, trauma and pregnancy. Correlation with other   laboratory and clinical findings is essential.       Immature Granulocytes       0.5     Ketones, UA 1+           Lactate, Bertram     1.3  Comment:  Falsely low lactic acid results can be found in samples   containing >=13.0 mg/dL total bilirubin and/or >=3.5 mg/dL   direct bilirubin.         Leukocytes, UA 3+           Lymph #       0.3     Lymph%       1.9     MCH       21.8     MCHC       29.7     MCV       73     Microscopic Comment SEE COMMENT  Comment:  Other formed elements not mentioned in the report are  not   present in the microscopic examination.              Mono #       0.5     Mono%       3.2     MPV       9.6     NITRITE UA Negative           nRBC       0     Occult Blood UA 3+           Ovalocytes       Occasional     pH, UA 6.0           Platelets       454     Poik       Slight     Potassium       3.4     PROTEIN TOTAL       7.1     Protein, UA Negative  Comment:  Recommend a 24 hour urine protein or a urine   protein/creatinine ratio if globulin induced proteinuria is  clinically suspected.             RBC       3.86     RBC, UA 0           RDW       19.6     SARS-CoV-2 RNA, Amplification, Qual   Negative  Comment:  This test utilizes isothermal nucleic acid amplification   technology to detect the SARS-CoV-2 RdRp nucleic acid segment.   The analytical sensitivity (limit of detection) is 125 genome   equivalents/mL.   A POSITIVE result implies infection with the SARS-CoV-2 virus;  the patient is presumed to be contagious.    A NEGATIVE result means that SARS-CoV-2 nucleic acids are not  present above the limit of detection. A NEGATIVE result should be   treated as presumptive. It does not rule out the possibility of   COVID-19 and should not be the sole basis for treatment decisions.   If COVID-19 is strongly suspected based on clinical and exposure   history, re-testing using an alternate molecular assay should be   considered.   This test is only for use under the Food and Drug   Administration s Emergency Use Authorization (EUA).   Commercial kits are provided by [employer].   Performance characteristics of the EUA have been independently  verified by [facility] Department of  Pathology and Laboratory Medicine.   _________________________________________________________________  The ID NOW COVID-19 Letter of Authorization, along with the   authorized Fact Sheet for Healthcare Providers, the authorized Fact  Sheet for Patients, and authorized labeling are available on the FDA    website:  www.fda.gov/MedicalDevices/Safety/EmergencySituations/cqk763364.htm           Sodium       139     Specific [place], UA 1.025           Specimen UA Urine, Clean Catch           UROBILINOGEN UA Negative           WBC Clumps, UA Many           WBC, UA >100           WBC       16.63           Diagnostic Results:  US: Reviewed: no evidence of retained products of conception    Assessment/Plan:     * Acute endometritis  Exam findings most consistent with endometritis.  No retained POC on ultrasound.  Clean catch UTI suspicious for possible UTI; however, this could be contaminant from vagina.  Cath urine culture collected.  Given foul smelling vaginal discharge and uterine tenderness, will treat for endometritis with IV gent and clinda.      Iron deficiency anemia secondary to inadequate dietary iron intake  Asymptomatic.  Iron supplementation.    Sepsis after obstetrical procedure  Admit to OB service.  Source is most likely endometritis.  IV fluids, IV antibiotics.  F/u blood cultures and cath urine cultures.  Follow-up GC/CT.  Repeat CBC and CMP in the morning.    Obesity  Given obesity, recent pregnancy, and current infection, will give lovenox for DVT prophylaxis.        Violetta Pak MD  Obstetrics & Gynecology  [facility] -

## 2020-08-06 NOTE — SUBJECTIVE & OBJECTIVE
OB History    Para Term  AB Living   5 0 0 0 3 1   SAB TAB Ectopic Multiple Live Births   3 0 0 0 1      # Outcome Date GA Lbr Marko/2nd Weight Sex Delivery Anes PTL Lv   5             4 SAB 20 10w4d          3 2019           2 2018           1  07/22/15    F CS-Unspec   KEE     Past Medical History:   Diagnosis Date    Acanthosis nigricans 3/27/2014    Anemia     Anxiety     Miscarriage within last 12 months     Obesity      Past Surgical History:   Procedure Laterality Date     SECTION      x1    DILATION AND CURETTAGE OF UTERUS      x2    DILATION AND CURETTAGE OF UTERUS USING SUCTION N/A 3/2/2020    Procedure: DILATION AND CURETTAGE, UTERUS, USING SUCTION;  Surgeon: Sherlyn Jesus MD;  Location: [facility] OR;  Service: OB/GYN;  Laterality: N/A;    DILATION AND CURETTAGE OF UTERUS USING SUCTION N/A 2020    Procedure: DILATION AND CURETTAGE, UTERUS, USING SUCTION;  Surgeon: Violetta Pak MD;  Location: [facility] OR;  Service: OB/GYN;  Laterality: N/A;       PTA Medications   Medication Sig    ibuprofen (ADVIL,MOTRIN) 600 MG tablet Take 1 tablet (600 mg total) by mouth every 8 (eight) hours as needed for Pain.    ferrous sulfate (FEOSOL) 325 mg (65 mg iron) Tab tablet Take 1 tablet (325 mg total) by mouth 2 (two) times daily.    HYDROcodone-acetaminophen (NORCO) 5-325 mg per tablet Take 1 tablet by mouth every 6 (six) hours as needed for Pain.       Review of patient's allergies indicates:   Allergen Reactions    Penicillins Hives    Vancomycin analogues         Family History     None        Tobacco Use    Smoking status: Former Smoker    Smokeless tobacco: Never Used   Substance and Sexual Activity    Alcohol use: No    Drug use: No    Sexual activity: Yes     Partners: Male     Review of Systems   Constitutional: Positive for chills, fatigue and fever. Negative for activity change and unexpected weight change.   Respiratory:  Negative for cough, shortness of breath and wheezing.    Cardiovascular: Negative for chest pain and palpitations.   Gastrointestinal: Positive for abdominal pain (lower abdomen). Negative for bloating, constipation, diarrhea, nausea and vomiting.   Endocrine: Negative for hair loss and hot flashes.   Genitourinary: Positive for pelvic pain and vaginal discharge. Negative for dysmenorrhea, dyspareunia, dysuria, frequency, genital sores, hematuria, menorrhagia, menstrual problem, urgency, vaginal bleeding, vaginal pain, postcoital bleeding and vaginal odor.   Musculoskeletal: Positive for back pain.   Integumentary:  Negative for rash, hair changes, breast mass, nipple discharge and breast skin changes.   Hematological: Negative for adenopathy.   Breast: Negative for mass, mastodynia, nipple discharge and skin changes     Objective:     Vital Signs (Most Recent):  Temp: 99.7 °F (37.6 °C) (08/06/20 1711)  Pulse: (!) 113 (08/06/20 1711)  Resp: 18 (08/06/20 1711)  BP: 131/76 (08/06/20 1711)  SpO2: 100 % (08/06/20 1711) Vital Signs (24h Range):  Temp:  [99.6 °F (37.6 °C)-100.4 °F (38 °C)] 99.7 °F (37.6 °C)  Pulse:  [112-133] 113  Resp:  [11-22] 18  SpO2:  [100 %] 100 %  BP: (104-146)/(60-95) 131/76     Weight: (!) 137.3 kg (302 lb 11.1 oz)  Body mass index is 46.02 kg/m².    Patient's last menstrual period was 12/15/2019.    Physical Exam:   Constitutional: She is oriented to person, place, and time. She appears well-developed and well-nourished. She appears distressed (appears ill).       Cardiovascular: Exam reveals no clubbing, no cyanosis and no edema.    tachycardic    Pulmonary/Chest: Effort normal. No respiratory distress. She has no wheezes. She has no rales.        Abdominal: Soft. She exhibits no distension and no mass. There is abdominal tenderness (suprapubic). There is no rebound and no guarding. Hernia confirmed negative in the right inguinal area and confirmed negative in the left inguinal area.      Genitourinary:    Pelvic exam was performed with patient supine.   There is no rash, tenderness, lesion or injury on the right labia. There is no rash, tenderness, lesion or injury on the left labia. Uterus is tender. Uterus is not deviated, not enlarged, not fixed and not experiencing uterine prolapse. Right adnexum displays no mass, no tenderness and no fullness. Left adnexum displays no mass, no tenderness and no fullness. No erythema, tenderness, bleeding, rectocele, cystocele or unspecified prolapse of vaginal walls in the vagina.    No foreign body in the vagina.      No signs of injury in the vagina.   Cervix exhibits motion tenderness and discharge. Cervix exhibits no friability. positive for vaginal discharge (yellow, foul smelling)       Uterus Size: 6 cm       Neurological: She is alert and oriented to person, place, and time.    Skin: No rash noted. No cyanosis or erythema. No pallor. Nails show no clubbing.    Psychiatric: She has a normal mood and affect.       Laboratory:  Recent Lab Results       08/06/20  1315   08/06/20  1240   08/06/20  1235   08/06/20  1227        Albumin       3.4     Alkaline Phosphatase       75     ALT       18     Anion Gap       10     Appearance, UA Clear           AST       22     Bacteria, UA Many           Baso #       0.03     Basophil%       0.2     Bilirubin (UA) Negative           BILIRUBIN TOTAL       0.4  Comment:  For infants and newborns, interpretation of results should be based  on gestational age, weight and in agreement with clinical  observations.  Premature Infant recommended reference ranges:  Up to 24 hours.............<8.0 mg/dL  Up to 48 hours............<12.0 mg/dL  3-5 days..................<15.0 mg/dL  6-29 days.................<15.0 mg/dL       BUN, Bld       10     Calcium       9.0     Chloride       107     CO2       22     Color, UA Yellow           Creatinine       0.8     Differential Method       Automated     eGFR if         >60     eGFR if non        >60  Comment:  Calculation used to obtain the estimated glomerular filtration  rate (eGFR) is the CKD-EPI equation.        Eos #       0.1     Eosinophil%       0.4     Glucose       93     Glucose, UA Negative           Gran # (ANC)       15.6     Gran%       93.8     Hematocrit       28.3     Hemoglobin       8.4     Immature Grans (Abs)       0.09  Comment:  Mild elevation in immature granulocytes is non specific and   can be seen in a variety of conditions including stress response,   acute inflammation, trauma and pregnancy. Correlation with other   laboratory and clinical findings is essential.       Immature Granulocytes       0.5     Ketones, UA 1+           Lactate, Bertram     1.3  Comment:  Falsely low lactic acid results can be found in samples   containing >=13.0 mg/dL total bilirubin and/or >=3.5 mg/dL   direct bilirubin.         Leukocytes, UA 3+           Lymph #       0.3     Lymph%       1.9     MCH       21.8     MCHC       29.7     MCV       73     Microscopic Comment SEE COMMENT  Comment:  Other formed elements not mentioned in the report are not   present in the microscopic examination.              Mono #       0.5     Mono%       3.2     MPV       9.6     NITRITE UA Negative           nRBC       0     Occult Blood UA 3+           Ovalocytes       Occasional     pH, UA 6.0           Platelets       454     Poik       Slight     Potassium       3.4     PROTEIN TOTAL       7.1     Protein, UA Negative  Comment:  Recommend a 24 hour urine protein or a urine   protein/creatinine ratio if globulin induced proteinuria is  clinically suspected.             RBC       3.86     RBC, UA 0           RDW       19.6     SARS-CoV-2 RNA, Amplification, Qual   Negative  Comment:  This test utilizes isothermal nucleic acid amplification   technology to detect the SARS-CoV-2 RdRp nucleic acid segment.   The analytical sensitivity (limit of detection) is 125 genome    equivalents/mL.   A POSITIVE result implies infection with the SARS-CoV-2 virus;  the patient is presumed to be contagious.    A NEGATIVE result means that SARS-CoV-2 nucleic acids are not  present above the limit of detection. A NEGATIVE result should be   treated as presumptive. It does not rule out the possibility of   COVID-19 and should not be the sole basis for treatment decisions.   If COVID-19 is strongly suspected based on clinical and exposure   history, re-testing using an alternate molecular assay should be   considered.   This test is only for use under the Food and Drug   Administration s Emergency Use Authorization (EUA).   Commercial kits are provided by [employer].   Performance characteristics of the EUA have been independently  verified by [facility] Department of  Pathology and Laboratory Medicine.   _________________________________________________________________  The ID NOW COVID-19 Letter of Authorization, along with the   authorized Fact Sheet for Healthcare Providers, the authorized Fact  Sheet for Patients, and authorized labeling are available on the FDA   website:  www.fda.gov/MedicalDevices/Safety/EmergencySituations/mqb426364.htm           Sodium       139     Specific [place], UA 1.025           Specimen UA Urine, Clean Catch           UROBILINOGEN UA Negative           WBC Clumps, UA Many           WBC, UA >100           WBC       16.63           Diagnostic Results:  US: Reviewed: no evidence of retained products of conception

## 2020-08-06 NOTE — PROGRESS NOTES
Pharmacokinetic Initial Assessment: Gentamicin    Assessment:  Weight utilized for dose calculation: Adjusted Body Weight  Dosing method utilized: extended interval dosing    Plan: Extended interval dosing regimen: Gentamicin 653.2 mg IV once, followed by a random level to be drawn on 8/7 at 0200, 8-12 hours after the first dose.    Pharmacy will continue to monitor.    Please contact pharmacy at extension [phone] with any questions regarding this assessment.    Thank you for the consult,    Irma Grajeda       Patient brief summary:  Lise Qureshi is a 24 y.o. female initiated on aminoglycoside therapy for treatment of suspected intra-abdominal infection    Drug Allergies:   Review of patient's allergies indicates:   Allergen Reactions    Penicillins Hives    Vancomycin analogues        Actual Body Weight:   137.3kg    Adjust Body Weight:   93.3kg    Ideal Body Weight:  63.9kg    Renal Function:   Estimated Creatinine Clearance: 161.1 mL/min (based on SCr of 0.8 mg/dL).,     Dialysis Method (if applicable):  N/A    CBC (last 72 hours):  Recent Labs   Lab Result Units 08/06/20  1227   WBC K/uL 16.63*   Hemoglobin g/dL 8.4*   Hematocrit % 28.3*   Platelets K/uL 454*   Gran% % 93.8*   Lymph% % 1.9*   Mono% % 3.2*   Eosinophil% % 0.4   Basophil% % 0.2   Differential Method  Automated       Metabolic Panel (last 72 hours):  Recent Labs   Lab Result Units 08/06/20  1227 08/06/20  1315   Sodium mmol/L 139  --    Potassium mmol/L 3.4*  --    Chloride mmol/L 107  --    CO2 mmol/L 22*  --    Glucose mg/dL 93  --    Glucose, UA   --  Negative   BUN, Bld mg/dL 10  --    Creatinine mg/dL 0.8  --    Albumin g/dL 3.4*  --    Total Bilirubin mg/dL 0.4  --    Alkaline Phosphatase U/L 75  --    AST U/L 22  --    ALT U/L 18  --        Microbiologic Results:  Microbiology Results (last 7 days)     Procedure Component Value Units Date/Time    C. trachomatis/N. gonorrhoeae by AMP DNA [facility]; Vagina [[id number]] Collected:  08/06/20 1600    Order Status: Sent Specimen: Genital Updated: 08/06/20 1636    Urine culture High Risk **CANNOT BE ORDERED STAT** [[id number]]     Order Status: Completed Specimen: Urine, Clean Catch     Urine culture High Risk **CANNOT BE ORDERED STAT** [[id number]]     Order Status: Canceled Specimen: Urine, Catheterized     Urine culture [[id number]] Collected: 08/06/20 1315    Order Status: No result Specimen: Urine Updated: 08/06/20 1349    Blood culture x two cultures. Draw prior to antibiotics. [[id number]] Collected: 08/06/20 1235    Order Status: Sent Specimen: Blood from Peripheral, Antecubital, Left Updated: 08/06/20 1252    Blood culture x two cultures. Draw prior to antibiotics. [[id number]] Collected: 08/06/20 1223    Order Status: Sent Specimen: Blood from Peripheral, Antecubital, Right Updated: 08/06/20 1233

## 2020-08-06 NOTE — ASSESSMENT & PLAN NOTE
Admit to OB service.  Source is most likely endometritis.  IV fluids, IV antibiotics.  F/u blood cultures and cath urine cultures.  Follow-up GC/CT.  Repeat CBC and CMP in the morning.

## 2020-08-07 LAB
ANION GAP SERPL CALC-SCNC: 7 MMOL/L (ref 8–16)
BASOPHILS # BLD AUTO: 0.03 K/UL (ref 0–0.2)
BASOPHILS NFR BLD: 0.2 % (ref 0–1.9)
BUN SERPL-MCNC: 7 MG/DL (ref 6–20)
CALCIUM SERPL-MCNC: 8.3 MG/DL (ref 8.7–10.5)
CHLORIDE SERPL-SCNC: 109 MMOL/L (ref 95–110)
CO2 SERPL-SCNC: 23 MMOL/L (ref 23–29)
CREAT SERPL-MCNC: 0.8 MG/DL (ref 0.5–1.4)
DIFFERENTIAL METHOD: ABNORMAL
EOSINOPHIL # BLD AUTO: 0.1 K/UL (ref 0–0.5)
EOSINOPHIL NFR BLD: 0.6 % (ref 0–8)
ERYTHROCYTE [DISTWIDTH] IN BLOOD BY AUTOMATED COUNT: 20 % (ref 11.5–14.5)
EST. GFR  (AFRICAN AMERICAN): >60 ML/MIN/1.73 M^2
EST. GFR  (NON AFRICAN AMERICAN): >60 ML/MIN/1.73 M^2
GENTAMICIN SERPL-MCNC: 1.1 UG/ML
GENTAMICIN TROUGH SERPL-MCNC: <0.5 UG/ML (ref 0–2)
GLUCOSE SERPL-MCNC: 92 MG/DL (ref 70–110)
HCT VFR BLD AUTO: 26.9 % (ref 37–48.5)
HGB BLD-MCNC: 7.6 G/DL (ref 12–16)
IMM GRANULOCYTES # BLD AUTO: 0.06 K/UL (ref 0–0.04)
IMM GRANULOCYTES NFR BLD AUTO: 0.5 % (ref 0–0.5)
LYMPHOCYTES # BLD AUTO: 0.8 K/UL (ref 1–4.8)
LYMPHOCYTES NFR BLD: 6.2 % (ref 18–48)
MCH RBC QN AUTO: 21.4 PG (ref 27–31)
MCHC RBC AUTO-ENTMCNC: 28.3 G/DL (ref 32–36)
MCV RBC AUTO: 76 FL (ref 82–98)
MONOCYTES # BLD AUTO: 0.5 K/UL (ref 0.3–1)
MONOCYTES NFR BLD: 4.1 % (ref 4–15)
NEUTROPHILS # BLD AUTO: 11.1 K/UL (ref 1.8–7.7)
NEUTROPHILS NFR BLD: 88.4 % (ref 38–73)
NRBC BLD-RTO: 0 /100 WBC
PLATELET # BLD AUTO: 390 K/UL (ref 150–350)
PMV BLD AUTO: 9.9 FL (ref 9.2–12.9)
POTASSIUM SERPL-SCNC: 3.6 MMOL/L (ref 3.5–5.1)
RBC # BLD AUTO: 3.55 M/UL (ref 4–5.4)
SODIUM SERPL-SCNC: 139 MMOL/L (ref 136–145)
WBC # BLD AUTO: 12.58 K/UL (ref 3.9–12.7)

## 2020-08-07 PROCEDURE — 85025 COMPLETE CBC W/AUTO DIFF WBC: CPT

## 2020-08-07 PROCEDURE — 25000003 PHARM REV CODE 250: Performed by: OBSTETRICS & GYNECOLOGY

## 2020-08-07 PROCEDURE — 80170 ASSAY OF GENTAMICIN: CPT

## 2020-08-07 PROCEDURE — 25000003 PHARM REV CODE 250: Performed by: STUDENT IN AN ORGANIZED HEALTH CARE EDUCATION/TRAINING PROGRAM

## 2020-08-07 PROCEDURE — 80048 BASIC METABOLIC PNL TOTAL CA: CPT

## 2020-08-07 PROCEDURE — 36415 COLL VENOUS BLD VENIPUNCTURE: CPT

## 2020-08-07 PROCEDURE — 80170 ASSAY OF GENTAMICIN: CPT | Mod: 91

## 2020-08-07 PROCEDURE — 63600175 PHARM REV CODE 636 W HCPCS: Performed by: OBSTETRICS & GYNECOLOGY

## 2020-08-07 PROCEDURE — 63600175 PHARM REV CODE 636 W HCPCS: Performed by: STUDENT IN AN ORGANIZED HEALTH CARE EDUCATION/TRAINING PROGRAM

## 2020-08-07 PROCEDURE — 11000001 HC ACUTE MED/SURG PRIVATE ROOM

## 2020-08-07 RX ADMIN — CLINDAMYCIN IN 5 PERCENT DEXTROSE 900 MG: 18 INJECTION, SOLUTION INTRAVENOUS at 06:08

## 2020-08-07 RX ADMIN — CLINDAMYCIN IN 5 PERCENT DEXTROSE 900 MG: 18 INJECTION, SOLUTION INTRAVENOUS at 11:08

## 2020-08-07 RX ADMIN — CLINDAMYCIN IN 5 PERCENT DEXTROSE 900 MG: 18 INJECTION, SOLUTION INTRAVENOUS at 03:08

## 2020-08-07 RX ADMIN — HYDROCODONE BITARTRATE AND ACETAMINOPHEN 1 TABLET: 10; 325 TABLET ORAL at 08:08

## 2020-08-07 RX ADMIN — ENOXAPARIN SODIUM 40 MG: 40 INJECTION SUBCUTANEOUS at 09:08

## 2020-08-07 RX ADMIN — IBUPROFEN 600 MG: 600 TABLET, FILM COATED ORAL at 01:08

## 2020-08-07 RX ADMIN — FERROUS SULFATE TAB EC 325 MG (65 MG FE EQUIVALENT) 325 MG: 325 (65 FE) TABLET DELAYED RESPONSE at 09:08

## 2020-08-07 RX ADMIN — IBUPROFEN 600 MG: 600 TABLET, FILM COATED ORAL at 09:08

## 2020-08-07 RX ADMIN — DOCUSATE SODIUM 100 MG: 100 CAPSULE, LIQUID FILLED ORAL at 09:08

## 2020-08-07 RX ADMIN — ENOXAPARIN SODIUM 40 MG: 40 INJECTION SUBCUTANEOUS at 08:08

## 2020-08-07 RX ADMIN — FERROUS SULFATE TAB EC 325 MG (65 MG FE EQUIVALENT) 325 MG: 325 (65 FE) TABLET DELAYED RESPONSE at 08:08

## 2020-08-07 RX ADMIN — GENTAMICIN SULFATE 653.2 MG: 40 INJECTION, SOLUTION INTRAMUSCULAR; INTRAVENOUS at 05:08

## 2020-08-07 RX ADMIN — ACETAMINOPHEN 650 MG: 325 TABLET ORAL at 06:08

## 2020-08-07 NOTE — PROGRESS NOTES
Pharmacokinetic Follow Up: Gentamicin    Assessment of levels:   The trough level was drawn correctly and can be used to guide therapy at this time OR the trough was drawn incorrectly and cannot be used to guide therapy at this time.  Trough level <0.5mcg/ml     Regimen Plan:   Change dosing regimen to: Gentamicin 653.2 mg IV every 24 hours    Drug levels (last 3 results):  No results for input(s): AMIKACINPEAK, AMIKACINTROU, AMIKACINRAND, AMIKACIN in the last 72 hours.    No results for input(s): GENTAMICIN, GENTPEAK, GENTTROUGH, GENT10, GENT12, GENT8, GENTRANDOM in the last 72 hours.    No results for input(s): TOBRA8, TOBRA10, TOBRA12, TOBRARND, TOBRAMYCIN, TOBRAPEAK, TOBRATROUGH, TOBRAMYCINPE, TOBRAMYCINRA, TOBRAMYCINTR in the last 72 hours.    Pharmacy will continue to monitor.    Please contact pharmacy at extension [phone] with any questions regarding this assessment.    Thank you for the consult,   Irma Grjaeda      Patient brief summary:  Lise Qureshi is a 24 y.o. female initiated on aminoglycoside therapy for treatment of endocarditis    Drug Allergies:   Review of patient's allergies indicates:   Allergen Reactions    Penicillins Hives    Vancomycin analogues        Actual Body Weight:   137.3kg    Adjust Body Weight:   93.3kg    Ideal Body Weight:  63.9kg    Renal Function:   Estimated Creatinine Clearance: 161.1 mL/min (based on SCr of 0.8 mg/dL).,     Dialysis Method (if applicable):  N/A    CBC (last 72 hours):  Recent Labs   Lab Result Units 08/06/20  1227 08/07/20  0623   WBC K/uL 16.63* 12.58   Hemoglobin g/dL 8.4* 7.6*   Hematocrit % 28.3* 26.9*   Platelets K/uL 454* 390*   Gran% % 93.8* 88.4*   Lymph% % 1.9* 6.2*   Mono% % 3.2* 4.1   Eosinophil% % 0.4 0.6   Basophil% % 0.2 0.2   Differential Method  Automated Automated       Metabolic Panel (last 72 hours):  Recent Labs   Lab Result Units 08/06/20  1227 08/06/20  1315 08/07/20  0623   Sodium mmol/L 139  --  139   Potassium mmol/L  3.4*  --  3.6   Chloride mmol/L 107  --  109   CO2 mmol/L 22*  --  23   Glucose mg/dL 93  --  92   Glucose, UA   --  Negative  --    BUN, Bld mg/dL 10  --  7   Creatinine mg/dL 0.8  --  0.8   Albumin g/dL 3.4*  --   --    Total Bilirubin mg/dL 0.4  --   --    Alkaline Phosphatase U/L 75  --   --    AST U/L 22  --   --    ALT U/L 18  --   --        Aminoglycoside Administrations:  aminoglycosides given in last 96 hours                   gentamicin (GARAMYCIN) 653.2 mg in sodium chloride 0.9% 100 mL IVPB (mg) 653.2 mg New Bag 08/06/20 1604                Microbiologic Results:  Microbiology Results (last 7 days)     Procedure Component Value Units Date/Time    Blood culture x two cultures. Draw prior to antibiotics. [[id number]] Collected: 08/06/20 1223    Order Status: Completed Specimen: Blood from Peripheral, Antecubital, Right Updated: 08/07/20 0515     Blood Culture, Routine No Growth to date    Narrative:      Aerobic and anaerobic    Blood culture x two cultures. Draw prior to antibiotics. [[id number]] Collected: 08/06/20 1235    Order Status: Completed Specimen: Blood from Peripheral, Antecubital, Left Updated: 08/07/20 0515     Blood Culture, Routine No Growth to date    Narrative:      Aerobic and anaerobic    C. trachomatis/N. gonorrhoeae by AMP DNA [facility]; Vagina [[id number]] Collected: 08/06/20 1600    Order Status: Sent Specimen: Genital Updated: 08/06/20 2158    Urine culture High Risk **CANNOT BE ORDERED STAT** [[id number]]     Order Status: Completed Specimen: Urine, Clean Catch     Urine culture High Risk **CANNOT BE ORDERED STAT** [[id number]]     Order Status: Canceled Specimen: Urine, Catheterized     Urine culture [[id number]] Collected: 08/06/20 1315    Order Status: No result Specimen: Urine Updated: 08/06/20 1349

## 2020-08-07 NOTE — ASSESSMENT & PLAN NOTE
Exam findings most consistent with endometritis.  No retained POC on ultrasound.  Clean catch UTI suspicious for possible UTI; however, this could be contaminant from vagina.  Cath urine culture collected.  Given foul smelling vaginal discharge and uterine tenderness, will treat for endometritis with IV gent and clinda.    8/7-Doing better this AM with improved pelvic pain. WBC improved from 16 to 12. Will continue to monitor, if afebrile overnight and continues to feel improved will hopefully discharge tomorrow on po antibiotics. Urine culture and GC pending.

## 2020-08-07 NOTE — ASSESSMENT & PLAN NOTE
Admit to OB service.  Source is most likely endometritis.  IV fluids, IV antibiotics.  F/u blood cultures and cath urine cultures.  Follow-up GC/CT.  Repeat CBC and CMP in the morning.  8/7-Improving WBC

## 2020-08-07 NOTE — PROGRESS NOTES
[facility] -   Obstetrics & Gynecology  Progress Note    Patient Name: Lise Qureshi  MRN: [id number]  Admission Date: 2020  Primary Care Provider: Primary Doctor No  Principal Problem: Acute endometritis    Subjective:     HPI:  25 y/o  presents to the ED on POD#7 s/p suction D&C for incomplete  with high fevers, body aches, and pelvic pain.  Her symptoms started early this morning, and progressively worsened through the day so she came in.  Has a headache.  No cough or SOB.  Has lower abdominal pain radiating into her back.  Bleeding from recent miscarriage has completely resolved, but she does note a discharge with odor.  Of note, back in March this year, she required suction D&C and hospital admission for septic .    Interval History: Patient reports she is doing well. She reports her pain is significantly better. She is ambulating well. Voiding well. No nausea or vomiting. She is eating and tolerating diet. Last temp was 100.4 yesterday at 1600. No vaginal bleeding and she reports the vaginal discharge is resolved.      Scheduled Meds:   clindamycin (CLEOCIN) IVPB  900 mg Intravenous Q8H    docusate sodium  100 mg Oral Daily    enoxparin  40 mg Subcutaneous Q12H    ferrous sulfate  325 mg Oral BID    gentamicin  7 mg/kg (Adjusted) Intravenous Q24H     Continuous Infusions:   sodium chloride 0.9% 125 mL/hr at 20 1905     PRN Meds:acetaminophen, HYDROcodone-acetaminophen, HYDROcodone-acetaminophen, ibuprofen, ondansetron, promethazine (PHENERGAN) IVPB, sodium chloride 0.9%    Review of patient's allergies indicates:   Allergen Reactions    Penicillins Hives    Vancomycin analogues        Objective:     Vital Signs (Most Recent):  Temp: 98.7 °F (37.1 °C) (20)  Pulse: 97 (20)  Resp: 16 (20)  BP: (!) 124/58 (20)  SpO2: 98 % (20) Vital Signs (24h Range):  Temp:  [98 °F (36.7 °C)-100.4 °F (38 °C)] 98.7 °F  (37.1 °C)  Pulse:  [] 97  Resp:  [11-22] 16  SpO2:  [98 %-100 %] 98 %  BP: (104-146)/(52-95) 124/58     Weight: (!) 137.3 kg (302 lb 11.1 oz)  Body mass index is 46.02 kg/m².  Patient's last menstrual period was 12/15/2019.    I&O (Last 24H):    Intake/Output Summary (Last 24 hours) at 8/7/2020 0758  Last data filed at 8/7/2020 0600  Gross per 24 hour   Intake 2527 ml   Output --   Net 2527 ml       Physical Exam:   Constitutional: She is oriented to person, place, and time. She appears well-developed and well-nourished. No distress.       Cardiovascular: Normal rate, regular rhythm and normal heart sounds.    No murmur heard.   Pulmonary/Chest: Effort normal and breath sounds normal. No respiratory distress. She has no wheezes. She has no rales.        Abdominal: Soft. Bowel sounds are normal. She exhibits no distension. There is no abdominal tenderness. There is no guarding.             Musculoskeletal: No edema.      Comments: No calf tenderness       Neurological: She is alert and oriented to person, place, and time.    Skin: Skin is warm and dry. No rash noted. She is not diaphoretic.        Laboratory:  CBC:   Recent Labs   Lab 08/07/20  0623   WBC 12.58   RBC 3.55*   HGB 7.6*   HCT 26.9*   *   MCV 76*   MCH 21.4*   MCHC 28.3*     CMP:   Recent Labs   Lab 08/06/20  1227 08/07/20  0623   GLU 93 92   CALCIUM 9.0 8.3*   ALBUMIN 3.4*  --    PROT 7.1  --     139   K 3.4* 3.6   CO2 22* 23    109   BUN 10 7   CREATININE 0.8 0.8   ALKPHOS 75  --    ALT 18  --    AST 22  --    BILITOT 0.4  --      Recent Labs   Lab 08/06/20  1315   COLORU Yellow   SPECGRAV 1.025   PHUR 6.0   PROTEINUA Negative   BACTERIA Many*   NITRITE Negative   LEUKOCYTESUR 3+*   UROBILINOGEN Negative     I have personallly reviewed all pertinent lab results from the last 24 hours.    Diagnostic Results:  Labs: Reviewed    Assessment/Plan:     * Acute endometritis  Exam findings most consistent with endometritis.  No  retained POC on ultrasound.  Clean catch UTI suspicious for possible UTI; however, this could be contaminant from vagina.  Cath urine culture collected.  Given foul smelling vaginal discharge and uterine tenderness, will treat for endometritis with IV gent and clinda.    8/7-Doing better this AM with improved pelvic pain. WBC improved from 16 to 12. Will continue to monitor, if afebrile overnight and continues to feel improved will hopefully discharge tomorrow on po antibiotics. Urine culture and GC pending.    Iron deficiency anemia secondary to inadequate dietary iron intake  Asymptomatic.  Iron supplementation.    Sepsis after obstetrical procedure  Admit to OB service.  Source is most likely endometritis.  IV fluids, IV antibiotics.  F/u blood cultures and cath urine cultures.  Follow-up GC/CT.  Repeat CBC and CMP in the morning.  8/7-Improving WBC    Obesity  Given obesity, recent pregnancy, and current infection, will give lovenox for DVT prophylaxis.        Ping Isabel PA-C  Obstetrics & Gynecology  [facility] -

## 2020-08-07 NOTE — PROGRESS NOTES
Pharmacokinetic Follow Up: Gentamicin    Assessment of levels:   Random concentration of 1.1 mcg/mL (10 hours post-infusion) corresponds to a dosing interval of every 24 hours per the [place] [employer]    Regimen Plan:   We will continue with the current dose and regimen every 24 hours. (Gentamicin 653.2 mg every 24 hours)  Will check trough concentration 30 min prior to second dose on 8/7 at 1600    Drug levels (last 3 results):  No results for input(s): AMIKACINPEAK, AMIKACINTROU, AMIKACINRAND, AMIKACIN in the last 72 hours.    No results for input(s): GENTAMICIN, GENTPEAK, GENTTROUGH, GENT10, GENT12, GENT8, GENTRANDOM in the last 72 hours.    No results for input(s): TOBRA8, TOBRA10, TOBRA12, TOBRARND, TOBRAMYCIN, TOBRAPEAK, TOBRATROUGH, TOBRAMYCINPE, TOBRAMYCINRA, TOBRAMYCINTR in the last 72 hours.    Pharmacy will continue to monitor.    Please contact pharmacy at extension [phone] with any questions regarding this assessment.    Thank you for the consult,   Endy Johnston      Patient brief summary:  Lise Qureshi is a 24 y.o. female initiated on aminoglycoside therapy for treatment of  Intra-abdominal infection post D&C    Drug Allergies:   Review of patient's allergies indicates:   Allergen Reactions    Penicillins Hives    Vancomycin analogues        Actual Body Weight:   137.3 kg    Adjust Body Weight:   93.3 kg    Ideal Body Weight:  63.9 kg    Renal Function:   Estimated Creatinine Clearance: 161.1 mL/min (based on SCr of 0.8 mg/dL).,     Dialysis Method (if applicable):  N/A    CBC (last 72 hours):  Recent Labs   Lab Result Units 08/06/20  1227   WBC K/uL 16.63*   Hemoglobin g/dL 8.4*   Hematocrit % 28.3*   Platelets K/uL 454*   Gran% % 93.8*   Lymph% % 1.9*   Mono% % 3.2*   Eosinophil% % 0.4   Basophil% % 0.2   Differential Method  Automated       Metabolic Panel (last 72 hours):  Recent Labs   Lab Result Units 08/06/20  1227 08/06/20  1315   Sodium mmol/L 139  --    Potassium mmol/L 3.4*  --     Chloride mmol/L 107  --    CO2 mmol/L 22*  --    Glucose mg/dL 93  --    Glucose, UA   --  Negative   BUN, Bld mg/dL 10  --    Creatinine mg/dL 0.8  --    Albumin g/dL 3.4*  --    Total Bilirubin mg/dL 0.4  --    Alkaline Phosphatase U/L 75  --    AST U/L 22  --    ALT U/L 18  --        Aminoglycoside Administrations:  aminoglycosides given in last 96 hours                     gentamicin (GARAMYCIN) 653.2 mg in sodium chloride 0.9% 100 mL IVPB (mg) 653.2 mg New Bag 08/06/20 1604                    Microbiologic Results:  Microbiology Results (last 7 days)       Procedure Component Value Units Date/Time    Blood culture x two cultures. Draw prior to antibiotics. [[id number]] Collected: 08/06/20 1223    Order Status: Sent Specimen: Blood from Peripheral, Antecubital, Right Updated: 08/06/20 2200    Blood culture x two cultures. Draw prior to antibiotics. [[id number]] Collected: 08/06/20 1235    Order Status: Sent Specimen: Blood from Peripheral, Antecubital, Left Updated: 08/06/20 2200    C. trachomatis/N. gonorrhoeae by AMP DNA [facility]; Vagina [[id number]] Collected: 08/06/20 1600    Order Status: Sent Specimen: Genital Updated: 08/06/20 2158    Urine culture High Risk **CANNOT BE ORDERED STAT** [[id number]]     Order Status: Completed Specimen: Urine, Clean Catch     Urine culture High Risk **CANNOT BE ORDERED STAT** [[id number]]     Order Status: Canceled Specimen: Urine, Catheterized     Urine culture [[id number]] Collected: 08/06/20 1315    Order Status: No result Specimen: Urine Updated: 08/06/20 1349

## 2020-08-07 NOTE — PLAN OF CARE
Plan of care reviewed with pt, pt verbalized understanding. IV site clean, dry, intact, no redness or swelling noted. Pt ambulated multiple times this shift, remains free of fall/trauma. Frequent complaint of headache, given PRN medication per MAR. VSS, remains afebrile. Purposeful q2h rounding done throughout shift, safety maintained, call light in reach, bed locked and in lowest position, side rails up x2. Will continue to monitor, observe and report any changes.

## 2020-08-07 NOTE — SUBJECTIVE & OBJECTIVE
Interval History: Patient reports she is doing well. She reports her pain is significantly better. She is ambulating well. Voiding well. No nausea or vomiting. She is eating and tolerating diet. Last temp was 100.4 yesterday at 1600. No vaginal bleeding and she reports the vaginal discharge is resolved.      Scheduled Meds:   clindamycin (CLEOCIN) IVPB  900 mg Intravenous Q8H    docusate sodium  100 mg Oral Daily    enoxparin  40 mg Subcutaneous Q12H    ferrous sulfate  325 mg Oral BID    gentamicin  7 mg/kg (Adjusted) Intravenous Q24H     Continuous Infusions:   sodium chloride 0.9% 125 mL/hr at 08/06/20 1905     PRN Meds:acetaminophen, HYDROcodone-acetaminophen, HYDROcodone-acetaminophen, ibuprofen, ondansetron, promethazine (PHENERGAN) IVPB, sodium chloride 0.9%    Review of patient's allergies indicates:   Allergen Reactions    Penicillins Hives    Vancomycin analogues        Objective:     Vital Signs (Most Recent):  Temp: 98.7 °F (37.1 °C) (08/07/20 0716)  Pulse: 97 (08/07/20 0716)  Resp: 16 (08/07/20 0716)  BP: (!) 124/58 (08/07/20 0716)  SpO2: 98 % (08/07/20 0716) Vital Signs (24h Range):  Temp:  [98 °F (36.7 °C)-100.4 °F (38 °C)] 98.7 °F (37.1 °C)  Pulse:  [] 97  Resp:  [11-22] 16  SpO2:  [98 %-100 %] 98 %  BP: (104-146)/(52-95) 124/58     Weight: (!) 137.3 kg (302 lb 11.1 oz)  Body mass index is 46.02 kg/m².  Patient's last menstrual period was 12/15/2019.    I&O (Last 24H):    Intake/Output Summary (Last 24 hours) at 8/7/2020 0758  Last data filed at 8/7/2020 0600  Gross per 24 hour   Intake 2527 ml   Output --   Net 2527 ml       Physical Exam:   Constitutional: She is oriented to person, place, and time. She appears well-developed and well-nourished. No distress.       Cardiovascular: Normal rate, regular rhythm and normal heart sounds.    No murmur heard.   Pulmonary/Chest: Effort normal and breath sounds normal. No respiratory distress. She has no wheezes. She has no rales.         Abdominal: Soft. Bowel sounds are normal. She exhibits no distension. There is no abdominal tenderness. There is no guarding.             Musculoskeletal: No edema.      Comments: No calf tenderness       Neurological: She is alert and oriented to person, place, and time.    Skin: Skin is warm and dry. No rash noted. She is not diaphoretic.        Laboratory:  CBC:   Recent Labs   Lab 08/07/20  0623   WBC 12.58   RBC 3.55*   HGB 7.6*   HCT 26.9*   *   MCV 76*   MCH 21.4*   MCHC 28.3*     CMP:   Recent Labs   Lab 08/06/20  1227 08/07/20  0623   GLU 93 92   CALCIUM 9.0 8.3*   ALBUMIN 3.4*  --    PROT 7.1  --     139   K 3.4* 3.6   CO2 22* 23    109   BUN 10 7   CREATININE 0.8 0.8   ALKPHOS 75  --    ALT 18  --    AST 22  --    BILITOT 0.4  --      Recent Labs   Lab 08/06/20  1315   COLORU Yellow   SPECGRAV 1.025   PHUR 6.0   PROTEINUA Negative   BACTERIA Many*   NITRITE Negative   LEUKOCYTESUR 3+*   UROBILINOGEN Negative     I have personallly reviewed all pertinent lab results from the last 24 hours.    Diagnostic Results:  Labs: Reviewed

## 2020-08-07 NOTE — PLAN OF CARE
Met with patient completed initial discharge planning assessment. Patient is independent with adls and iadls.  Patient stated that she dis not  her Fe pills due to the cost. Patient agreed to a pharmacy assistance referral, referral placed.  [facility] sent link for [employer] to patient's phone. Phone # updated in system. Patient denies any post hospital needs or services at this time.  Updated white board with 's name and number. Transitional Care Folder, Discharge Planning Begins on Admission pamphlet, [facility] Bedside Delivery pamphlet, Advance Directive information given to patient along with the contact information given.Instructed patient or family to call with any questions or concerns.           D/C Plan: home  PCP:no pcp  Preferred Pharmacy:  [facility] in [place]  Discharge transportation: pov  [facility]:link sent  Pharmacy Bedside Delivery: yes         08/07/20 1109   Discharge Assessment   Assessment Type Discharge Planning Assessment   Confirmed/corrected address and phone number on facesheet? Yes   Assessment information obtained from? Patient;Medical Record   Expected Length of Stay (days)   (tbd)   Communicated expected length of stay with patient/caregiver yes   Prior to hospitilization cognitive status: Alert/Oriented   Prior to hospitalization functional status: Independent   Current cognitive status: Alert/Oriented   Current Functional Status: Independent   Facility Arrived From: home   Lives With alone   Able to Return to Prior Arrangements yes   Is patient able to care for self after discharge? Yes   Who are your caregiver(s) and their phone number(s)? Roly solis ( friend)n [phone]   Patient's perception of discharge disposition home or selfcare   Readmission Within the Last 30 Days no previous admission in last 30 days   Patient currently being followed by outpatient case management? No   Patient currently receives any other outside agency services? No    Equipment Currently Used at Home none   Do you have any problems affording any of your prescribed medications? Yes   If yes, what medications? Fe   Is the patient taking medications as prescribed? yes   Does the patient have transportation home? Yes   Transportation Anticipated family or friend will provide   Does the patient receive services at the [facility]? No   Discharge Plan A Home   Discharge Plan B Home;Home with family   DME Needed Upon Discharge  none   Patient/Family in Agreement with Plan yes

## 2020-08-07 NOTE — HOSPITAL COURSE
"Patient was admitted for IV antibiotics. Her pain improved quickly with treatment and vaginal discharge resolved.  8/8/20-pt c/o severe headache, described 10/10 for nurse. Pt alert & oriented, no obvious neuro deficits, does not appear to be in distress, answering questions appropriately. Reports has h/o headaches in past but tend to resolve. Just received pain med. HA described as pressure in back of her head radiating to front. Pt reports felt " pop" when she turned over on her left side earlier-requests CT scan. Temp 100.7 @ 9am today. Will continue antibiotics  8/9/20-pt desires discharge. Reports pain in her gums, thinks that might be related to her headache. Has not seen a dentist >1 year. Also reports little burning with urination this morning. Denies abnormal vaginal discharge/bleeding.  "

## 2020-08-08 PROBLEM — E66.01 MORBID OBESITY WITH BMI OF 45.0-49.9, ADULT: Status: ACTIVE | Noted: 2020-08-08

## 2020-08-08 PROBLEM — G44.89 OTHER HEADACHE SYNDROME: Status: ACTIVE | Noted: 2020-08-08

## 2020-08-08 LAB
ANION GAP SERPL CALC-SCNC: 7 MMOL/L (ref 8–16)
BACTERIA UR CULT: NORMAL
BASOPHILS # BLD AUTO: 0.02 K/UL (ref 0–0.2)
BASOPHILS NFR BLD: 0.3 % (ref 0–1.9)
BUN SERPL-MCNC: 5 MG/DL (ref 6–20)
CALCIUM SERPL-MCNC: 8.2 MG/DL (ref 8.7–10.5)
CHLORIDE SERPL-SCNC: 106 MMOL/L (ref 95–110)
CO2 SERPL-SCNC: 25 MMOL/L (ref 23–29)
CREAT SERPL-MCNC: 0.7 MG/DL (ref 0.5–1.4)
DIFFERENTIAL METHOD: ABNORMAL
EOSINOPHIL # BLD AUTO: 0 K/UL (ref 0–0.5)
EOSINOPHIL NFR BLD: 0.7 % (ref 0–8)
ERYTHROCYTE [DISTWIDTH] IN BLOOD BY AUTOMATED COUNT: 20 % (ref 11.5–14.5)
EST. GFR  (AFRICAN AMERICAN): >60 ML/MIN/1.73 M^2
EST. GFR  (NON AFRICAN AMERICAN): >60 ML/MIN/1.73 M^2
GLUCOSE SERPL-MCNC: 92 MG/DL (ref 70–110)
HCT VFR BLD AUTO: 27.1 % (ref 37–48.5)
HGB BLD-MCNC: 7.7 G/DL (ref 12–16)
IMM GRANULOCYTES # BLD AUTO: 0.03 K/UL (ref 0–0.04)
IMM GRANULOCYTES NFR BLD AUTO: 0.5 % (ref 0–0.5)
LYMPHOCYTES # BLD AUTO: 1.2 K/UL (ref 1–4.8)
LYMPHOCYTES NFR BLD: 20.2 % (ref 18–48)
MCH RBC QN AUTO: 21.3 PG (ref 27–31)
MCHC RBC AUTO-ENTMCNC: 28.4 G/DL (ref 32–36)
MCV RBC AUTO: 75 FL (ref 82–98)
MONOCYTES # BLD AUTO: 0.7 K/UL (ref 0.3–1)
MONOCYTES NFR BLD: 11.3 % (ref 4–15)
NEUTROPHILS # BLD AUTO: 4 K/UL (ref 1.8–7.7)
NEUTROPHILS NFR BLD: 67 % (ref 38–73)
NRBC BLD-RTO: 0 /100 WBC
PLATELET # BLD AUTO: 378 K/UL (ref 150–350)
PMV BLD AUTO: 9.9 FL (ref 9.2–12.9)
POTASSIUM SERPL-SCNC: 3.5 MMOL/L (ref 3.5–5.1)
RBC # BLD AUTO: 3.62 M/UL (ref 4–5.4)
SODIUM SERPL-SCNC: 138 MMOL/L (ref 136–145)
WBC # BLD AUTO: 5.94 K/UL (ref 3.9–12.7)

## 2020-08-08 PROCEDURE — 25000003 PHARM REV CODE 250: Performed by: STUDENT IN AN ORGANIZED HEALTH CARE EDUCATION/TRAINING PROGRAM

## 2020-08-08 PROCEDURE — 36415 COLL VENOUS BLD VENIPUNCTURE: CPT

## 2020-08-08 PROCEDURE — 99232 SBSQ HOSP IP/OBS MODERATE 35: CPT | Mod: ,,, | Performed by: OBSTETRICS & GYNECOLOGY

## 2020-08-08 PROCEDURE — 11000001 HC ACUTE MED/SURG PRIVATE ROOM

## 2020-08-08 PROCEDURE — 80048 BASIC METABOLIC PNL TOTAL CA: CPT

## 2020-08-08 PROCEDURE — 99232 PR SUBSEQUENT HOSPITAL CARE,LEVL II: ICD-10-PCS | Mod: ,,, | Performed by: OBSTETRICS & GYNECOLOGY

## 2020-08-08 PROCEDURE — 85025 COMPLETE CBC W/AUTO DIFF WBC: CPT

## 2020-08-08 PROCEDURE — 25000003 PHARM REV CODE 250: Performed by: OBSTETRICS & GYNECOLOGY

## 2020-08-08 PROCEDURE — 63600175 PHARM REV CODE 636 W HCPCS: Performed by: STUDENT IN AN ORGANIZED HEALTH CARE EDUCATION/TRAINING PROGRAM

## 2020-08-08 PROCEDURE — 63600175 PHARM REV CODE 636 W HCPCS: Performed by: OBSTETRICS & GYNECOLOGY

## 2020-08-08 RX ADMIN — DOCUSATE SODIUM 100 MG: 100 CAPSULE, LIQUID FILLED ORAL at 08:08

## 2020-08-08 RX ADMIN — SODIUM CHLORIDE: 0.9 INJECTION, SOLUTION INTRAVENOUS at 10:08

## 2020-08-08 RX ADMIN — ENOXAPARIN SODIUM 40 MG: 40 INJECTION SUBCUTANEOUS at 09:08

## 2020-08-08 RX ADMIN — ACETAMINOPHEN 650 MG: 325 TABLET ORAL at 08:08

## 2020-08-08 RX ADMIN — FERROUS SULFATE TAB EC 325 MG (65 MG FE EQUIVALENT) 325 MG: 325 (65 FE) TABLET DELAYED RESPONSE at 08:08

## 2020-08-08 RX ADMIN — CLINDAMYCIN IN 5 PERCENT DEXTROSE 900 MG: 18 INJECTION, SOLUTION INTRAVENOUS at 07:08

## 2020-08-08 RX ADMIN — HYDROCODONE BITARTRATE AND ACETAMINOPHEN 1 TABLET: 10; 325 TABLET ORAL at 12:08

## 2020-08-08 RX ADMIN — CLINDAMYCIN IN 5 PERCENT DEXTROSE 900 MG: 18 INJECTION, SOLUTION INTRAVENOUS at 11:08

## 2020-08-08 RX ADMIN — ACETAMINOPHEN 650 MG: 325 TABLET ORAL at 10:08

## 2020-08-08 RX ADMIN — CLINDAMYCIN IN 5 PERCENT DEXTROSE 900 MG: 18 INJECTION, SOLUTION INTRAVENOUS at 02:08

## 2020-08-08 RX ADMIN — FERROUS SULFATE TAB EC 325 MG (65 MG FE EQUIVALENT) 325 MG: 325 (65 FE) TABLET DELAYED RESPONSE at 09:08

## 2020-08-08 RX ADMIN — ENOXAPARIN SODIUM 40 MG: 40 INJECTION SUBCUTANEOUS at 08:08

## 2020-08-08 RX ADMIN — GENTAMICIN SULFATE 653.2 MG: 40 INJECTION, SOLUTION INTRAMUSCULAR; INTRAVENOUS at 05:08

## 2020-08-08 NOTE — ASSESSMENT & PLAN NOTE
Will treat w/ currently prn pain meds. If continues to be severe in nature, will proceed w/ CT head

## 2020-08-08 NOTE — ASSESSMENT & PLAN NOTE
Admit to OB service.  Source is most likely endometritis.  IV fluids, IV antibiotics.  F/u blood cultures and cath urine cultures.  Follow-up GC/CT.  Repeat CBC and CMP in the morning.  8/7-Improving WBC      8/8-repeat cbc

## 2020-08-08 NOTE — PLAN OF CARE
Problem: Adult Inpatient Plan of Care  Goal: Absence of Hospital-Acquired Illness or Injury  Outcome: Ongoing, Progressing  Goal: Optimal Comfort and Wellbeing  Outcome: Ongoing, Progressing     Problem: Bariatric Environmental Safety  Goal: Safety Maintained with Care  Outcome: Ongoing, Progressing

## 2020-08-08 NOTE — PROGRESS NOTES
[facility]  Obstetrics & Gynecology  Progress Note    Patient Name: Lise Qureshi  MRN: [id number]  Admission Date: 2020  Primary Care Provider: Primary Doctor No  Principal Problem: Acute endometritis    Subjective:     HPI:  23 y/o  presents to the ED on POD#7 s/p suction D&C for incomplete  with high fevers, body aches, and pelvic pain.  Her symptoms started early this morning, and progressively worsened through the day so she came in.  Has a headache.  No cough or SOB.  Has lower abdominal pain radiating into her back.  Bleeding from recent miscarriage has completely resolved, but she does note a discharge with odor.  Of note, back in March this year, she required suction D&C and hospital admission for septic .    Interval History: s/p antibiotic IV for endometritis after D&C for SAB    Scheduled Meds:   clindamycin (CLEOCIN) IVPB  900 mg Intravenous Q8H    docusate sodium  100 mg Oral Daily    enoxparin  40 mg Subcutaneous Q12H    ferrous sulfate  325 mg Oral BID    gentamicin  7 mg/kg (Adjusted) Intravenous Q24H     Continuous Infusions:   sodium chloride 0.9% 125 mL/hr at 20 1004     PRN Meds:acetaminophen, HYDROcodone-acetaminophen, HYDROcodone-acetaminophen, ibuprofen, ondansetron, promethazine (PHENERGAN) IVPB, sodium chloride 0.9%    Review of patient's allergies indicates:   Allergen Reactions    Penicillins Hives    Vancomycin analogues        Objective:     Vital Signs (Most Recent):  Temp: (!) 100.7 °F (38.2 °C) (20)  Pulse: (!) 120 (20)  Resp: 19 (20)  BP: (!) 140/87 (20)  SpO2: 97 % (20) Vital Signs (24h Range):  Temp:  [97.9 °F (36.6 °C)-100.7 °F (38.2 °C)] 100.7 °F (38.2 °C)  Pulse:  [] 120  Resp:  [18-19] 19  SpO2:  [97 %-100 %] 97 %  BP: (129-146)/(70-89) 140/87     Weight: (!) 137.3 kg (302 lb 11.1 oz)  Body mass index is 46.02 kg/m².  Patient's last menstrual period was  12/15/2019.    I&O (Last 24H):    Intake/Output Summary (Last 24 hours) at 8/8/2020 1031  Last data filed at 8/7/2020 1800  Gross per 24 hour   Intake 2950 ml   Output --   Net 2950 ml       Physical Exam:   Constitutional: She is oriented to person, place, and time. She appears well-developed and well-nourished.        Pulmonary/Chest: Effort normal.        Abdominal: Soft. She exhibits no distension and no abdominal incision. There is no abdominal tenderness. There is no guarding.     Genitourinary:    Genitourinary Comments: No new changes             Musculoskeletal: Moves all extremeties.       Neurological: She is alert and oriented to person, place, and time.   No facial droop/slurring    Skin: Skin is warm and dry.        Laboratory:  I have personallly reviewed all pertinent lab results from the last 24 hours.    Diagnostic Results:  Labs: Reviewed    Assessment/Plan:     * Acute endometritis  Exam findings most consistent with endometritis.  No retained POC on ultrasound.  Clean catch UTI suspicious for possible UTI; however, this could be contaminant from vagina.  Cath urine culture collected.  Given foul smelling vaginal discharge and uterine tenderness, will treat for endometritis with IV gent and clinda.    8/7-Doing better this AM with improved pelvic pain. WBC improved from 16 to 12. Will continue to monitor, if afebrile overnight and continues to feel improved will hopefully discharge tomorrow on po antibiotics. Urine culture and GC pending.  8/8-GC pending. Repeat cbc shows stable H/H, & decreased/normal wbc    Other headache syndrome  Will treat w/ currently prn pain meds. If continues to be severe in nature, will proceed w/ CT head    Morbid obesity with BMI of 45.0-49.9, adult  Prophylactic lovenox    Iron deficiency anemia secondary to inadequate dietary iron intake  Asymptomatic.  Iron supplementation.    Sepsis after obstetrical procedure  Admit to OB service.  Source is most likely  endometritis.  IV fluids, IV antibiotics.  F/u blood cultures and cath urine cultures.  Follow-up GC/CT.  Repeat CBC and CMP in the morning.  8/7-Improving WBC      8/8-repeat cbc    Recurrent pregnancy loss          still febrile this AM-continue antibiotics    Tiff Hernandez MD  Obstetrics & Gynecology  [facility]

## 2020-08-08 NOTE — SUBJECTIVE & OBJECTIVE
Interval History: s/p antibiotic IV for endometritis after D&C for SAB    Scheduled Meds:   clindamycin (CLEOCIN) IVPB  900 mg Intravenous Q8H    docusate sodium  100 mg Oral Daily    enoxparin  40 mg Subcutaneous Q12H    ferrous sulfate  325 mg Oral BID    gentamicin  7 mg/kg (Adjusted) Intravenous Q24H     Continuous Infusions:   sodium chloride 0.9% 125 mL/hr at 08/08/20 1004     PRN Meds:acetaminophen, HYDROcodone-acetaminophen, HYDROcodone-acetaminophen, ibuprofen, ondansetron, promethazine (PHENERGAN) IVPB, sodium chloride 0.9%    Review of patient's allergies indicates:   Allergen Reactions    Penicillins Hives    Vancomycin analogues        Objective:     Vital Signs (Most Recent):  Temp: (!) 100.7 °F (38.2 °C) (08/08/20 0744)  Pulse: (!) 120 (08/08/20 0744)  Resp: 19 (08/08/20 0744)  BP: (!) 140/87 (08/08/20 0744)  SpO2: 97 % (08/08/20 0744) Vital Signs (24h Range):  Temp:  [97.9 °F (36.6 °C)-100.7 °F (38.2 °C)] 100.7 °F (38.2 °C)  Pulse:  [] 120  Resp:  [18-19] 19  SpO2:  [97 %-100 %] 97 %  BP: (129-146)/(70-89) 140/87     Weight: (!) 137.3 kg (302 lb 11.1 oz)  Body mass index is 46.02 kg/m².  Patient's last menstrual period was 12/15/2019.    I&O (Last 24H):    Intake/Output Summary (Last 24 hours) at 8/8/2020 1031  Last data filed at 8/7/2020 1800  Gross per 24 hour   Intake 2950 ml   Output --   Net 2950 ml       Physical Exam:   Constitutional: She is oriented to person, place, and time. She appears well-developed and well-nourished.        Pulmonary/Chest: Effort normal.        Abdominal: Soft. She exhibits no distension and no abdominal incision. There is no abdominal tenderness. There is no guarding.     Genitourinary:    Genitourinary Comments: No new changes             Musculoskeletal: Moves all extremeties.       Neurological: She is alert and oriented to person, place, and time.   No facial droop/slurring    Skin: Skin is warm and dry.        Laboratory:  I have personallly  reviewed all pertinent lab results from the last 24 hours.    Diagnostic Results:  Labs: Reviewed

## 2020-08-08 NOTE — ASSESSMENT & PLAN NOTE
Exam findings most consistent with endometritis.  No retained POC on ultrasound.  Clean catch UTI suspicious for possible UTI; however, this could be contaminant from vagina.  Cath urine culture collected.  Given foul smelling vaginal discharge and uterine tenderness, will treat for endometritis with IV gent and clinda.    8/7-Doing better this AM with improved pelvic pain. WBC improved from 16 to 12. Will continue to monitor, if afebrile overnight and continues to feel improved will hopefully discharge tomorrow on po antibiotics. Urine culture and GC pending.  8/8-GC pending. Repeat cbc shows stable H/H, & decreased/normal wbc

## 2020-08-08 NOTE — PLAN OF CARE
Patient remained free from injury. PRN pain meds managed pain. IVF and IV abx maintained. No s/s of acute distress. 12hr chart check complete.

## 2020-08-08 NOTE — PROGRESS NOTES
[facility]  Obstetrics & Gynecology  Progress Note    Patient Name: Lise Qureshi  MRN: [id number]  Admission Date: 2020  Primary Care Provider: Primary Doctor No  Principal Problem: Acute endometritis    Subjective:     HPI:  25 y/o  presents to the ED on POD#7 s/p suction D&C for incomplete  with high fevers, body aches, and pelvic pain.  Her symptoms started early this morning, and progressively worsened through the day so she came in.  Has a headache.  No cough or SOB.  Has lower abdominal pain radiating into her back.  Bleeding from recent miscarriage has completely resolved, but she does note a discharge with odor.  Of note, back in March this year, she required suction D&C and hospital admission for septic .    Interval History: s/p antibiotic IV for endometritis after D&C for SAB    Scheduled Meds:   clindamycin (CLEOCIN) IVPB  900 mg Intravenous Q8H    docusate sodium  100 mg Oral Daily    enoxparin  40 mg Subcutaneous Q12H    ferrous sulfate  325 mg Oral BID    gentamicin  7 mg/kg (Adjusted) Intravenous Q24H     Continuous Infusions:   sodium chloride 0.9% 125 mL/hr at 20 1004     PRN Meds:acetaminophen, HYDROcodone-acetaminophen, HYDROcodone-acetaminophen, ibuprofen, ondansetron, promethazine (PHENERGAN) IVPB, sodium chloride 0.9%    Review of patient's allergies indicates:   Allergen Reactions    Penicillins Hives    Vancomycin analogues        Objective:     Vital Signs (Most Recent):  Temp: (!) 100.7 °F (38.2 °C) (20)  Pulse: (!) 120 (20)  Resp: 19 (20)  BP: (!) 140/87 (20)  SpO2: 97 % (20) Vital Signs (24h Range):  Temp:  [97.9 °F (36.6 °C)-100.7 °F (38.2 °C)] 100.7 °F (38.2 °C)  Pulse:  [] 120  Resp:  [18-19] 19  SpO2:  [97 %-100 %] 97 %  BP: (129-146)/(70-89) 140/87     Weight: (!) 137.3 kg (302 lb 11.1 oz)  Body mass index is 46.02 kg/m².  Patient's last menstrual period was  12/15/2019.    I&O (Last 24H):    Intake/Output Summary (Last 24 hours) at 8/8/2020 1031  Last data filed at 8/7/2020 1800  Gross per 24 hour   Intake 2950 ml   Output --   Net 2950 ml       Physical Exam:   Constitutional: She is oriented to person, place, and time. She appears well-developed and well-nourished.        Pulmonary/Chest: Effort normal.        Abdominal: Soft. She exhibits no distension and no abdominal incision. There is no abdominal tenderness. There is no guarding.     Genitourinary:    Genitourinary Comments: No new changes             Musculoskeletal: Moves all extremeties.       Neurological: She is alert and oriented to person, place, and time.   No facial droop/slurring    Skin: Skin is warm and dry.        Laboratory:  I have personallly reviewed all pertinent lab results from the last 24 hours.    Diagnostic Results:  Labs: Reviewed    Assessment/Plan:     * Acute endometritis  Exam findings most consistent with endometritis.  No retained POC on ultrasound.  Clean catch UTI suspicious for possible UTI; however, this could be contaminant from vagina.  Cath urine culture collected.  Given foul smelling vaginal discharge and uterine tenderness, will treat for endometritis with IV gent and clinda.    8/7-Doing better this AM with improved pelvic pain. WBC improved from 16 to 12. Will continue to monitor, if afebrile overnight and continues to feel improved will hopefully discharge tomorrow on po antibiotics. Urine culture and GC pending.  8/8-urine culture neg-rocephin d/c. Repeat cbc. GC pending.     Other headache syndrome  Will treat w/ currently prn pain meds. If continues to be severe in nature, will proceed w/ CT head    Morbid obesity with BMI of 45.0-49.9, adult  Prophylactic lovenox    Iron deficiency anemia secondary to inadequate dietary iron intake  Asymptomatic.  Iron supplementation.    Sepsis after obstetrical procedure  Admit to OB service.  Source is most likely endometritis.   IV fluids, IV antibiotics.  F/u blood cultures and cath urine cultures.  Follow-up GC/CT.  Repeat CBC and CMP in the morning.  8/7-Improving WBC      8/8-repeat cbc    Recurrent pregnancy loss              Tiff Hernandez MD  Obstetrics & Gynecology  [facility]

## 2020-08-09 VITALS
TEMPERATURE: 99 F | DIASTOLIC BLOOD PRESSURE: 87 MMHG | HEART RATE: 111 BPM | BODY MASS INDEX: 44.41 KG/M2 | RESPIRATION RATE: 18 BRPM | OXYGEN SATURATION: 99 % | WEIGHT: 293 LBS | HEIGHT: 68 IN | SYSTOLIC BLOOD PRESSURE: 162 MMHG

## 2020-08-09 PROBLEM — R30.0 DYSURIA: Status: ACTIVE | Noted: 2020-08-09

## 2020-08-09 LAB
ANION GAP SERPL CALC-SCNC: 8 MMOL/L (ref 8–16)
BUN SERPL-MCNC: 6 MG/DL (ref 6–20)
CALCIUM SERPL-MCNC: 8.8 MG/DL (ref 8.7–10.5)
CHLORIDE SERPL-SCNC: 105 MMOL/L (ref 95–110)
CO2 SERPL-SCNC: 27 MMOL/L (ref 23–29)
CREAT SERPL-MCNC: 0.7 MG/DL (ref 0.5–1.4)
EST. GFR  (AFRICAN AMERICAN): >60 ML/MIN/1.73 M^2
EST. GFR  (NON AFRICAN AMERICAN): >60 ML/MIN/1.73 M^2
GLUCOSE SERPL-MCNC: 94 MG/DL (ref 70–110)
POTASSIUM SERPL-SCNC: 3.5 MMOL/L (ref 3.5–5.1)
SODIUM SERPL-SCNC: 140 MMOL/L (ref 136–145)

## 2020-08-09 PROCEDURE — 99232 PR SUBSEQUENT HOSPITAL CARE,LEVL II: ICD-10-PCS | Mod: ,,, | Performed by: OBSTETRICS & GYNECOLOGY

## 2020-08-09 PROCEDURE — 80048 BASIC METABOLIC PNL TOTAL CA: CPT

## 2020-08-09 PROCEDURE — 25000003 PHARM REV CODE 250: Performed by: STUDENT IN AN ORGANIZED HEALTH CARE EDUCATION/TRAINING PROGRAM

## 2020-08-09 PROCEDURE — 99232 SBSQ HOSP IP/OBS MODERATE 35: CPT | Mod: ,,, | Performed by: OBSTETRICS & GYNECOLOGY

## 2020-08-09 PROCEDURE — 36415 COLL VENOUS BLD VENIPUNCTURE: CPT

## 2020-08-09 PROCEDURE — 25000003 PHARM REV CODE 250: Performed by: OBSTETRICS & GYNECOLOGY

## 2020-08-09 PROCEDURE — 63600175 PHARM REV CODE 636 W HCPCS: Performed by: OBSTETRICS & GYNECOLOGY

## 2020-08-09 RX ORDER — METRONIDAZOLE 500 MG/1
500 TABLET ORAL EVERY 12 HOURS
Qty: 14 TABLET | Refills: 0 | Status: SHIPPED | OUTPATIENT
Start: 2020-08-09 | End: 2020-08-12

## 2020-08-09 RX ORDER — IBUPROFEN 800 MG/1
800 TABLET ORAL 3 TIMES DAILY
Qty: 21 TABLET | Refills: 0 | Status: SHIPPED | OUTPATIENT
Start: 2020-08-09 | End: 2020-08-16

## 2020-08-09 RX ADMIN — CLINDAMYCIN IN 5 PERCENT DEXTROSE 900 MG: 18 INJECTION, SOLUTION INTRAVENOUS at 10:08

## 2020-08-09 RX ADMIN — FERROUS SULFATE TAB EC 325 MG (65 MG FE EQUIVALENT) 325 MG: 325 (65 FE) TABLET DELAYED RESPONSE at 08:08

## 2020-08-09 RX ADMIN — ENOXAPARIN SODIUM 40 MG: 40 INJECTION SUBCUTANEOUS at 08:08

## 2020-08-09 RX ADMIN — ACETAMINOPHEN 650 MG: 325 TABLET ORAL at 08:08

## 2020-08-09 RX ADMIN — CLINDAMYCIN IN 5 PERCENT DEXTROSE 900 MG: 18 INJECTION, SOLUTION INTRAVENOUS at 04:08

## 2020-08-09 NOTE — SUBJECTIVE & OBJECTIVE
Interval History: s/p gent/clind for endometritis after suction D&C for SAB. Headache improved    Scheduled Meds:   clindamycin (CLEOCIN) IVPB  900 mg Intravenous Q8H    docusate sodium  100 mg Oral Daily    enoxparin  40 mg Subcutaneous Q12H    ferrous sulfate  325 mg Oral BID    gentamicin  7 mg/kg (Adjusted) Intravenous Q24H     Continuous Infusions:  PRN Meds:acetaminophen, HYDROcodone-acetaminophen, ibuprofen, ondansetron, promethazine (PHENERGAN) IVPB, sodium chloride 0.9%    Review of patient's allergies indicates:   Allergen Reactions    Penicillins Hives    Vancomycin analogues        Objective:     Vital Signs (Most Recent):  Temp: 98.7 °F (37.1 °C) (08/09/20 0725)  Pulse: (!) 111 (08/09/20 0725)  Resp: 18 (08/09/20 0725)  BP: (!) 162/87 (08/09/20 0725)  SpO2: 99 % (08/09/20 0725) Vital Signs (24h Range):  Temp:  [98.3 °F (36.8 °C)-99.6 °F (37.6 °C)] 98.7 °F (37.1 °C)  Pulse:  [] 111  Resp:  [16-18] 18  SpO2:  [99 %-100 %] 99 %  BP: (136-162)/(64-95) 162/87     Weight: (!) 137.3 kg (302 lb 11.1 oz)  Body mass index is 46.02 kg/m².  Patient's last menstrual period was 12/15/2019.    I&O (Last 24H):    Intake/Output Summary (Last 24 hours) at 8/9/2020 1157  Last data filed at 8/9/2020 1028  Gross per 24 hour   Intake 1070 ml   Output --   Net 1070 ml       Physical Exam:   Constitutional: She is oriented to person, place, and time. She appears well-developed and well-nourished.        Pulmonary/Chest: Effort normal.        Abdominal: Soft. She exhibits no distension. There is no abdominal tenderness.     Genitourinary:    Genitourinary Comments: No bleeding             Musculoskeletal: Moves all extremeties.       Neurological: She is alert and oriented to person, place, and time.    Skin: Skin is warm and dry.    Psychiatric: She has a normal mood and affect. Her behavior is normal.       Laboratory:  I have personallly reviewed all pertinent lab results from the last 24 hours.    Diagnostic  Results:  Labs: Reviewed

## 2020-08-09 NOTE — ASSESSMENT & PLAN NOTE
Admit to OB service.  Source is most likely endometritis.  IV fluids, IV antibiotics.  F/u blood cultures and cath urine cultures.  Follow-up GC/CT.  Repeat CBC and CMP in the morning.  8/7-Improving WBC      8/8-repeat cbc: normal

## 2020-08-09 NOTE — ASSESSMENT & PLAN NOTE
Exam findings most consistent with endometritis.  No retained POC on ultrasound.  Clean catch UTI suspicious for possible UTI; however, this could be contaminant from vagina.  Cath urine culture collected.  Given foul smelling vaginal discharge and uterine tenderness, will treat for endometritis with IV gent and clinda.    8/7-Doing better this AM with improved pelvic pain. WBC improved from 16 to 12. Will continue to monitor, if afebrile overnight and continues to feel improved will hopefully discharge tomorrow on po antibiotics. Urine culture and GC pending.  8/8-GC pending. Repeat cbc shows stable H/H, & decreased/normal wbc  8/9/20-afebrile >24 hrs. Stable for discharge

## 2020-08-09 NOTE — PROGRESS NOTES
[facility]  Obstetrics & Gynecology  Progress Note    Patient Name: Lise Qureshi  MRN: [id number]  Admission Date: 2020  Primary Care Provider: Primary Doctor No  Principal Problem: Acute endometritis    Subjective:     HPI:  25 y/o  presents to the ED on POD#7 s/p suction D&C for incomplete  with high fevers, body aches, and pelvic pain.  Her symptoms started early this morning, and progressively worsened through the day so she came in.  Has a headache.  No cough or SOB.  Has lower abdominal pain radiating into her back.  Bleeding from recent miscarriage has completely resolved, but she does note a discharge with odor.  Of note, back in March this year, she required suction D&C and hospital admission for septic .    Interval History: s/p gent/clind for endometritis after suction D&C for SAB. Headache improved    Scheduled Meds:   clindamycin (CLEOCIN) IVPB  900 mg Intravenous Q8H    docusate sodium  100 mg Oral Daily    enoxparin  40 mg Subcutaneous Q12H    ferrous sulfate  325 mg Oral BID    gentamicin  7 mg/kg (Adjusted) Intravenous Q24H     Continuous Infusions:  PRN Meds:acetaminophen, HYDROcodone-acetaminophen, ibuprofen, ondansetron, promethazine (PHENERGAN) IVPB, sodium chloride 0.9%    Review of patient's allergies indicates:   Allergen Reactions    Penicillins Hives    Vancomycin analogues        Objective:     Vital Signs (Most Recent):  Temp: 98.7 °F (37.1 °C) (20)  Pulse: (!) 111 (20)  Resp: 18 (20)  BP: (!) 162/87 (20)  SpO2: 99 % (20) Vital Signs (24h Range):  Temp:  [98.3 °F (36.8 °C)-99.6 °F (37.6 °C)] 98.7 °F (37.1 °C)  Pulse:  [] 111  Resp:  [16-18] 18  SpO2:  [99 %-100 %] 99 %  BP: (136-162)/(64-95) 162/87     Weight: (!) 137.3 kg (302 lb 11.1 oz)  Body mass index is 46.02 kg/m².  Patient's last menstrual period was 12/15/2019.    I&O (Last 24H):    Intake/Output Summary (Last 24  hours) at 8/9/2020 1157  Last data filed at 8/9/2020 1028  Gross per 24 hour   Intake 1070 ml   Output --   Net 1070 ml       Physical Exam:   Constitutional: She is oriented to person, place, and time. She appears well-developed and well-nourished.        Pulmonary/Chest: Effort normal.        Abdominal: Soft. She exhibits no distension. There is no abdominal tenderness.     Genitourinary:    Genitourinary Comments: No bleeding             Musculoskeletal: Moves all extremeties.       Neurological: She is alert and oriented to person, place, and time.    Skin: Skin is warm and dry.    Psychiatric: She has a normal mood and affect. Her behavior is normal.       Laboratory:  I have personallly reviewed all pertinent lab results from the last 24 hours.    Diagnostic Results:  Labs: Reviewed    Assessment/Plan:     * Acute endometritis  Exam findings most consistent with endometritis.  No retained POC on ultrasound.  Clean catch UTI suspicious for possible UTI; however, this could be contaminant from vagina.  Cath urine culture collected.  Given foul smelling vaginal discharge and uterine tenderness, will treat for endometritis with IV gent and clinda.    8/7-Doing better this AM with improved pelvic pain. WBC improved from 16 to 12. Will continue to monitor, if afebrile overnight and continues to feel improved will hopefully discharge tomorrow on po antibiotics. Urine culture and GC pending.  8/8-GC pending. Repeat cbc shows stable H/H, & decreased/normal wbc  8/9/20-afebrile >24 hrs. Stable for discharge    Dysuria  No martins catheter in >2 days. Will check urinalysis    Other headache syndrome  Will treat w/ currently prn pain meds. If continues to be severe in nature, will proceed w/ CT head  improved    Morbid obesity with BMI of 45.0-49.9, adult  Prophylactic lovenox    Iron deficiency anemia secondary to inadequate dietary iron intake  Asymptomatic.  Iron supplementation.    Sepsis after obstetrical  procedure  Admit to OB service.  Source is most likely endometritis.  IV fluids, IV antibiotics.  F/u blood cultures and cath urine cultures.  Follow-up GC/CT.  Repeat CBC and CMP in the morning.  8/7-Improving WBC      8/8-repeat cbc: normal    Recurrent pregnancy loss              Tiff Hernandez MD  Obstetrics & Gynecology  [facility]

## 2020-08-09 NOTE — ASSESSMENT & PLAN NOTE
Will treat w/ currently prn pain meds. If continues to be severe in nature, will proceed w/ CT head  improved

## 2020-08-09 NOTE — PLAN OF CARE
Pt complains of headache. Pain medication is effective. IV ABX given per order. Pt denies abdominal pain. Pt has remained afebrile. No injuries. Will continue to monitor. 12 hour chart check is completed.

## 2020-08-09 NOTE — DISCHARGE SUMMARY
"[facility]  Obstetrics & Gynecology  Discharge Summary    Patient Name: Lise Qureshi  MRN: [id number]  Admission Date: 2020  Hospital Length of Stay: 3 days  Discharge Date and Time: 20  Attending Physician: Violetta Pak MD   Discharging Provider: Tiff Hernandez MD  Primary Care Provider: Primary Doctor No    HPI:  23 y/o  presents to the ED on POD#7 s/p suction D&C for incomplete  with high fevers, body aches, and pelvic pain.  Her symptoms started early this morning, and progressively worsened through the day so she came in.  Has a headache.  No cough or SOB.  Has lower abdominal pain radiating into her back.  Bleeding from recent miscarriage has completely resolved, but she does note a discharge with odor.  Of note, back in March this year, she required suction D&C and hospital admission for septic .    Hospital Course:  Patient was admitted for IV antibiotics. Her pain improved quickly with treatment and vaginal discharge resolved.  20-pt c/o severe headache, described 10/10 for nurse. Pt alert & oriented, no obvious neuro deficits, does not appear to be in distress, answering questions appropriately. Reports has h/o headaches in past but tend to resolve. Just received pain med. HA described as pressure in back of her head radiating to front. Pt reports felt " pop" when she turned over on her left side earlier-requests CT scan. Temp 100.7 @ 9am today. Will continue antibiotics  20-pt desires discharge. Reports pain in her gums, thinks that might be related to her headache. Has not seen a dentist >1 year. Also reports little burning with urination this morning. Denies abnormal vaginal discharge/bleeding.    * No surgery found *     Consults (From admission, onward)        Status Ordering Provider     Pharmacy to dose Aminoglycosides consult  Once     Provider:  (Not yet assigned)    Acknowledged DUSTY SEN          Significant Diagnostic " Studies: Labs: All labs within the past 24 hours have been reviewed      Pending Diagnostic Studies:     None        Final Active Diagnoses:    Diagnosis Date Noted POA    PRINCIPAL PROBLEM:  Acute endometritis [N71.0] 08/06/2020 Yes    Dysuria [R30.0] 08/09/2020 No    Morbid obesity with BMI of 45.0-49.9, adult [E66.01, Z68.42] 08/08/2020 Not Applicable    Other headache syndrome [G44.89] 08/08/2020 No    Sepsis after obstetrical procedure [O86.04] 08/06/2020 Yes    Iron deficiency anemia secondary to inadequate dietary iron intake [D50.8] 08/06/2020 Yes      Problems Resolved During this Admission:    Diagnosis Date Noted Date Resolved POA    Obesity [E66.9] 03/27/2014 08/08/2020 Yes        Discharged Condition: good    Disposition: home    Follow Up:  Follow-up Information     Violetta Pak MD In 4 weeks.    Specialties: Obstetrics and Gynecology, Obstetrics  Why: [facility]  Contact information:  [address] [place] [address] [address] Fernando [place] [id number] [phone]                 Patient Instructions:      Ambulatory referral/consult to Pharmacy Assistance   Standing Status: Future   Referral Priority: Routine Referral Type: Consultation   Referral Reason: Specialty Services Required   Number of Visits Requested: 1     Medications:  Reconciled Home Medications:      Medication List      START taking these medications    metroNIDAZOLE 500 MG tablet  Commonly known as: FlagyL  Take 1 tablet (500 mg total) by mouth every 12 (twelve) hours. for 3 days        CHANGE how you take these medications    ibuprofen 800 MG tablet  Commonly known as: ADVIL,MOTRIN  Take 1 tablet (800 mg total) by mouth 3 (three) times daily. for 7 days  What changed:   · medication strength  · how much to take  · when to take this  · reasons to take this        CONTINUE taking these medications    ferrous sulfate 325 mg (65 mg iron) Tab tablet  Commonly known as: FEOSOL  Take 1 tablet (325 mg total) by mouth 2 (two) times daily.        STOP  taking these medications    HYDROcodone-acetaminophen 5-325 mg per tablet  Commonly known as: NORCO        flagyl to complete 7 days of antibiotics    Tiff Hernandez MD  Obstetrics & Gynecology  [facility]

## 2020-08-10 LAB
C TRACH DNA SPEC QL NAA+PROBE: NOT DETECTED
N GONORRHOEA DNA SPEC QL NAA+PROBE: NOT DETECTED

## 2020-08-11 LAB
BACTERIA BLD CULT: NORMAL
BACTERIA BLD CULT: NORMAL

## 2020-08-11 NOTE — PLAN OF CARE
08/11/20 0918   Final Note   Assessment Type Final Discharge Note   Anticipated Discharge Disposition Home   Right Care Referral Info   Post Acute Recommendation No Care

## 2020-08-19 LAB
COMBISNP, POC: NORMAL
COMBISNP, POC: NORMAL

## 2020-10-11 NOTE — ED NOTES
Pt AAOx3, resting in bed, side rails up x 2, call bell within reach. NAD at this time. Will continue to monitor.     Patient/Caregiver requests family/friend to interpret.

## 2021-04-29 ENCOUNTER — PATIENT MESSAGE (OUTPATIENT)
Dept: RESEARCH | Facility: HOSPITAL | Age: 26
End: 2021-04-29

## 2021-05-21 ENCOUNTER — HOSPITAL ENCOUNTER (EMERGENCY)
Facility: HOSPITAL | Age: 26
Discharge: HOME | End: 2021-05-22
Attending: EMERGENCY MEDICINE
Payer: MEDICAID

## 2021-05-21 DIAGNOSIS — R07.9 CHEST PAIN, UNSPECIFIED TYPE: Primary | ICD-10-CM

## 2021-05-21 DIAGNOSIS — K21.9 GERD WITHOUT ESOPHAGITIS: ICD-10-CM

## 2021-05-21 DIAGNOSIS — R07.9 CHEST PAIN: ICD-10-CM

## 2021-05-21 LAB
ALBUMIN SERPL BCP-MCNC: 3.5 G/DL (ref 3.5–5.2)
ALP SERPL-CCNC: 93 U/L (ref 55–135)
ALT SERPL W/O P-5'-P-CCNC: 26 U/L (ref 10–44)
ANION GAP SERPL CALC-SCNC: 11 MMOL/L (ref 8–16)
AST SERPL-CCNC: 21 U/L (ref 10–40)
BASOPHILS # BLD AUTO: 0.02 K/UL (ref 0–0.2)
BASOPHILS NFR BLD: 0.3 % (ref 0–1.9)
BILIRUB SERPL-MCNC: 0.3 MG/DL (ref 0.1–1)
BUN SERPL-MCNC: 13 MG/DL (ref 6–20)
CALCIUM SERPL-MCNC: 8.9 MG/DL (ref 8.7–10.5)
CHLORIDE SERPL-SCNC: 102 MMOL/L (ref 95–110)
CO2 SERPL-SCNC: 24 MMOL/L (ref 23–29)
CREAT SERPL-MCNC: 0.8 MG/DL (ref 0.5–1.4)
D DIMER PPP IA.FEU-MCNC: <0.19 MG/L FEU
DIFFERENTIAL METHOD: ABNORMAL
EOSINOPHIL # BLD AUTO: 0.1 K/UL (ref 0–0.5)
EOSINOPHIL NFR BLD: 1.1 % (ref 0–8)
ERYTHROCYTE [DISTWIDTH] IN BLOOD BY AUTOMATED COUNT: 17.5 % (ref 11.5–14.5)
EST. GFR  (AFRICAN AMERICAN): >60 ML/MIN/1.73 M^2
EST. GFR  (NON AFRICAN AMERICAN): >60 ML/MIN/1.73 M^2
GLUCOSE SERPL-MCNC: 142 MG/DL (ref 70–110)
HCT VFR BLD AUTO: 35.7 % (ref 37–48.5)
HGB BLD-MCNC: 10.8 G/DL (ref 12–16)
IMM GRANULOCYTES # BLD AUTO: 0.02 K/UL (ref 0–0.04)
IMM GRANULOCYTES NFR BLD AUTO: 0.3 % (ref 0–0.5)
LYMPHOCYTES # BLD AUTO: 2.7 K/UL (ref 1–4.8)
LYMPHOCYTES NFR BLD: 37 % (ref 18–48)
MCH RBC QN AUTO: 23.8 PG (ref 27–31)
MCHC RBC AUTO-ENTMCNC: 30.3 G/DL (ref 32–36)
MCV RBC AUTO: 79 FL (ref 82–98)
MONOCYTES # BLD AUTO: 0.6 K/UL (ref 0.3–1)
MONOCYTES NFR BLD: 8.6 % (ref 4–15)
NEUTROPHILS # BLD AUTO: 3.8 K/UL (ref 1.8–7.7)
NEUTROPHILS NFR BLD: 52.7 % (ref 38–73)
NRBC BLD-RTO: 0 /100 WBC
PLATELET # BLD AUTO: 413 K/UL (ref 150–450)
PMV BLD AUTO: 10.1 FL (ref 9.2–12.9)
POTASSIUM SERPL-SCNC: 3.6 MMOL/L (ref 3.5–5.1)
PROT SERPL-MCNC: 7.4 G/DL (ref 6–8.4)
RBC # BLD AUTO: 4.53 M/UL (ref 4–5.4)
SODIUM SERPL-SCNC: 137 MMOL/L (ref 136–145)
TROPONIN I SERPL DL<=0.01 NG/ML-MCNC: <0.006 NG/ML (ref 0–0.03)
WBC # BLD AUTO: 7.19 K/UL (ref 3.9–12.7)

## 2021-05-21 PROCEDURE — 80053 COMPREHEN METABOLIC PANEL: CPT | Mod: ER | Performed by: EMERGENCY MEDICINE

## 2021-05-21 PROCEDURE — 85379 FIBRIN DEGRADATION QUANT: CPT | Mod: ER | Performed by: EMERGENCY MEDICINE

## 2021-05-21 PROCEDURE — 99284 EMERGENCY DEPT VISIT MOD MDM: CPT | Mod: 25,ER

## 2021-05-21 PROCEDURE — 84484 ASSAY OF TROPONIN QUANT: CPT | Mod: ER | Performed by: EMERGENCY MEDICINE

## 2021-05-21 PROCEDURE — 85025 COMPLETE CBC W/AUTO DIFF WBC: CPT | Mod: ER | Performed by: EMERGENCY MEDICINE

## 2021-05-21 RX ORDER — HYOSCYAMINE SULFATE 0.12 MG/1
0.12 TABLET SUBLINGUAL EVERY 6 HOURS PRN
Qty: 12 TABLET | Refills: 0 | Status: SHIPPED | OUTPATIENT
Start: 2021-05-21

## 2021-05-21 RX ORDER — PANTOPRAZOLE SODIUM 20 MG/1
20 TABLET, DELAYED RELEASE ORAL DAILY
Qty: 10 TABLET | Refills: 0 | Status: SHIPPED | OUTPATIENT
Start: 2021-05-21 | End: 2023-08-28

## 2021-05-22 VITALS
WEIGHT: 293 LBS | BODY MASS INDEX: 49.49 KG/M2 | DIASTOLIC BLOOD PRESSURE: 67 MMHG | TEMPERATURE: 98 F | OXYGEN SATURATION: 99 % | HEART RATE: 94 BPM | RESPIRATION RATE: 24 BRPM | SYSTOLIC BLOOD PRESSURE: 129 MMHG

## 2021-08-19 ENCOUNTER — HOSPITAL ENCOUNTER (EMERGENCY)
Facility: HOSPITAL | Age: 26
Discharge: HOME | End: 2021-08-19
Attending: EMERGENCY MEDICINE
Payer: MEDICAID

## 2021-08-19 VITALS
TEMPERATURE: 99 F | SYSTOLIC BLOOD PRESSURE: 141 MMHG | RESPIRATION RATE: 20 BRPM | DIASTOLIC BLOOD PRESSURE: 78 MMHG | BODY MASS INDEX: 49.88 KG/M2 | WEIGHT: 293 LBS | OXYGEN SATURATION: 100 % | HEART RATE: 104 BPM

## 2021-08-19 DIAGNOSIS — R50.9 FEVER, UNSPECIFIED FEVER CAUSE: Primary | ICD-10-CM

## 2021-08-19 DIAGNOSIS — R05.9 COUGH: ICD-10-CM

## 2021-08-19 DIAGNOSIS — R68.83 CHILLS: ICD-10-CM

## 2021-08-19 LAB
CTP QC/QA: YES
SARS-COV-2 RDRP RESP QL NAA+PROBE: NEGATIVE

## 2021-08-19 PROCEDURE — U0002 COVID-19 LAB TEST NON-CDC: HCPCS | Mod: ER | Performed by: EMERGENCY MEDICINE

## 2021-08-19 PROCEDURE — 99284 EMERGENCY DEPT VISIT MOD MDM: CPT | Mod: 25,ER

## 2021-08-19 RX ORDER — PROMETHAZINE HYDROCHLORIDE AND DEXTROMETHORPHAN HYDROBROMIDE 6.25; 15 MG/5ML; MG/5ML
5 SYRUP ORAL EVERY 6 HOURS PRN
Qty: 120 ML | Refills: 0 | Status: SHIPPED | OUTPATIENT
Start: 2021-08-19 | End: 2021-08-29

## 2021-08-19 RX ORDER — LOSARTAN POTASSIUM 50 MG/1
50 TABLET ORAL DAILY
Qty: 90 TABLET | Refills: 3 | Status: SHIPPED | OUTPATIENT
Start: 2021-08-19 | End: 2022-08-19

## 2021-12-14 ENCOUNTER — TELEPHONE (OUTPATIENT)
Dept: OBSTETRICS AND GYNECOLOGY | Facility: CLINIC | Age: 26
End: 2021-12-14
Payer: MEDICAID

## 2021-12-17 PROCEDURE — 96360 HYDRATION IV INFUSION INIT: CPT

## 2021-12-17 PROCEDURE — 96361 HYDRATE IV INFUSION ADD-ON: CPT

## 2021-12-17 PROCEDURE — 99284 EMERGENCY DEPT VISIT MOD MDM: CPT | Mod: 25

## 2021-12-18 ENCOUNTER — HOSPITAL ENCOUNTER (EMERGENCY)
Facility: HOSPITAL | Age: 26
Discharge: HOME | End: 2021-12-18
Attending: FAMILY MEDICINE
Payer: MEDICAID

## 2021-12-18 VITALS
HEART RATE: 98 BPM | BODY MASS INDEX: 43.4 KG/M2 | WEIGHT: 293 LBS | HEIGHT: 69 IN | RESPIRATION RATE: 16 BRPM | SYSTOLIC BLOOD PRESSURE: 138 MMHG | OXYGEN SATURATION: 98 % | DIASTOLIC BLOOD PRESSURE: 68 MMHG | TEMPERATURE: 98 F

## 2021-12-18 DIAGNOSIS — O20.0 THREATENED MISCARRIAGE: Primary | ICD-10-CM

## 2021-12-18 LAB
ALBUMIN SERPL BCP-MCNC: 3.6 G/DL (ref 3.5–5.2)
ALP SERPL-CCNC: 78 U/L (ref 55–135)
ALT SERPL W/O P-5'-P-CCNC: 19 U/L (ref 10–44)
ANION GAP SERPL CALC-SCNC: 13 MMOL/L (ref 8–16)
AST SERPL-CCNC: 24 U/L (ref 10–40)
BASOPHILS # BLD AUTO: 0.02 K/UL (ref 0–0.2)
BASOPHILS NFR BLD: 0.2 % (ref 0–1.9)
BILIRUB SERPL-MCNC: 0.4 MG/DL (ref 0.1–1)
BUN SERPL-MCNC: 7 MG/DL (ref 6–20)
CALCIUM SERPL-MCNC: 9.6 MG/DL (ref 8.7–10.5)
CHLORIDE SERPL-SCNC: 105 MMOL/L (ref 95–110)
CO2 SERPL-SCNC: 20 MMOL/L (ref 23–29)
CREAT SERPL-MCNC: 0.6 MG/DL (ref 0.5–1.4)
DIFFERENTIAL METHOD: ABNORMAL
EOSINOPHIL # BLD AUTO: 0 K/UL (ref 0–0.5)
EOSINOPHIL NFR BLD: 0.2 % (ref 0–8)
ERYTHROCYTE [DISTWIDTH] IN BLOOD BY AUTOMATED COUNT: 16.7 % (ref 11.5–14.5)
EST. GFR  (AFRICAN AMERICAN): >60 ML/MIN/1.73 M^2
EST. GFR  (NON AFRICAN AMERICAN): >60 ML/MIN/1.73 M^2
GLUCOSE SERPL-MCNC: 83 MG/DL (ref 70–110)
HCG INTACT+B SERPL-ACNC: NORMAL MIU/ML
HCT VFR BLD AUTO: 36.7 % (ref 37–48.5)
HGB BLD-MCNC: 11.5 G/DL (ref 12–16)
IMM GRANULOCYTES # BLD AUTO: 0.04 K/UL (ref 0–0.04)
IMM GRANULOCYTES NFR BLD AUTO: 0.5 % (ref 0–0.5)
LYMPHOCYTES # BLD AUTO: 2.3 K/UL (ref 1–4.8)
LYMPHOCYTES NFR BLD: 26.6 % (ref 18–48)
MCH RBC QN AUTO: 25.1 PG (ref 27–31)
MCHC RBC AUTO-ENTMCNC: 31.3 G/DL (ref 32–36)
MCV RBC AUTO: 80 FL (ref 82–98)
MONOCYTES # BLD AUTO: 0.8 K/UL (ref 0.3–1)
MONOCYTES NFR BLD: 9.7 % (ref 4–15)
NEUTROPHILS # BLD AUTO: 5.3 K/UL (ref 1.8–7.7)
NEUTROPHILS NFR BLD: 62.8 % (ref 38–73)
NRBC BLD-RTO: 0 /100 WBC
PLATELET # BLD AUTO: 351 K/UL (ref 150–450)
PMV BLD AUTO: 9.5 FL (ref 9.2–12.9)
POTASSIUM SERPL-SCNC: 4.2 MMOL/L (ref 3.5–5.1)
PROT SERPL-MCNC: 7.5 G/DL (ref 6–8.4)
RBC # BLD AUTO: 4.59 M/UL (ref 4–5.4)
SODIUM SERPL-SCNC: 138 MMOL/L (ref 136–145)
WBC # BLD AUTO: 8.47 K/UL (ref 3.9–12.7)

## 2021-12-18 PROCEDURE — 84702 CHORIONIC GONADOTROPIN TEST: CPT | Performed by: NURSE PRACTITIONER

## 2021-12-18 PROCEDURE — 80053 COMPREHEN METABOLIC PANEL: CPT | Performed by: NURSE PRACTITIONER

## 2021-12-18 PROCEDURE — 85025 COMPLETE CBC W/AUTO DIFF WBC: CPT | Performed by: NURSE PRACTITIONER

## 2021-12-18 PROCEDURE — 25000003 PHARM REV CODE 250: Performed by: NURSE PRACTITIONER

## 2021-12-18 RX ADMIN — SODIUM CHLORIDE 1000 ML: 0.9 INJECTION, SOLUTION INTRAVENOUS at 01:12

## 2021-12-20 ENCOUNTER — HOSPITAL ENCOUNTER (EMERGENCY)
Facility: HOSPITAL | Age: 26
Discharge: HOME | End: 2021-12-20
Attending: EMERGENCY MEDICINE
Payer: MEDICAID

## 2021-12-20 VITALS
TEMPERATURE: 99 F | HEIGHT: 69 IN | WEIGHT: 293 LBS | OXYGEN SATURATION: 99 % | BODY MASS INDEX: 43.4 KG/M2 | SYSTOLIC BLOOD PRESSURE: 145 MMHG | HEART RATE: 98 BPM | RESPIRATION RATE: 16 BRPM | DIASTOLIC BLOOD PRESSURE: 85 MMHG

## 2021-12-20 DIAGNOSIS — O20.9 VAGINAL BLEEDING IN PREGNANCY, FIRST TRIMESTER: Primary | ICD-10-CM

## 2021-12-20 DIAGNOSIS — N93.9 VAGINAL BLEEDING: ICD-10-CM

## 2021-12-20 LAB
ALBUMIN SERPL BCP-MCNC: 3.4 G/DL (ref 3.5–5.2)
ALP SERPL-CCNC: 87 U/L (ref 55–135)
ALT SERPL W/O P-5'-P-CCNC: 27 U/L (ref 10–44)
ANION GAP SERPL CALC-SCNC: 11 MMOL/L (ref 8–16)
AST SERPL-CCNC: 29 U/L (ref 10–40)
BACTERIA #/AREA URNS HPF: ABNORMAL /HPF
BASOPHILS # BLD AUTO: 0.02 K/UL (ref 0–0.2)
BASOPHILS NFR BLD: 0.4 % (ref 0–1.9)
BILIRUB SERPL-MCNC: 0.4 MG/DL (ref 0.1–1)
BILIRUB UR QL STRIP: NEGATIVE
BUN SERPL-MCNC: 4 MG/DL (ref 6–20)
CALCIUM SERPL-MCNC: 9.2 MG/DL (ref 8.7–10.5)
CHLORIDE SERPL-SCNC: 105 MMOL/L (ref 95–110)
CLARITY UR: CLEAR
CO2 SERPL-SCNC: 21 MMOL/L (ref 23–29)
COLOR UR: YELLOW
CREAT SERPL-MCNC: 0.7 MG/DL (ref 0.5–1.4)
DIFFERENTIAL METHOD: ABNORMAL
EOSINOPHIL # BLD AUTO: 0 K/UL (ref 0–0.5)
EOSINOPHIL NFR BLD: 0 % (ref 0–8)
ERYTHROCYTE [DISTWIDTH] IN BLOOD BY AUTOMATED COUNT: 16.6 % (ref 11.5–14.5)
EST. GFR  (AFRICAN AMERICAN): >60 ML/MIN/1.73 M^2
EST. GFR  (NON AFRICAN AMERICAN): >60 ML/MIN/1.73 M^2
GLUCOSE SERPL-MCNC: 114 MG/DL (ref 70–110)
GLUCOSE UR QL STRIP: NEGATIVE
HCG INTACT+B SERPL-ACNC: NORMAL MIU/ML
HCT VFR BLD AUTO: 37.2 % (ref 37–48.5)
HGB BLD-MCNC: 11.7 G/DL (ref 12–16)
HGB UR QL STRIP: ABNORMAL
IMM GRANULOCYTES # BLD AUTO: 0.02 K/UL (ref 0–0.04)
IMM GRANULOCYTES NFR BLD AUTO: 0.4 % (ref 0–0.5)
KETONES UR QL STRIP: ABNORMAL
LEUKOCYTE ESTERASE UR QL STRIP: ABNORMAL
LYMPHOCYTES # BLD AUTO: 0.8 K/UL (ref 1–4.8)
LYMPHOCYTES NFR BLD: 16.8 % (ref 18–48)
MCH RBC QN AUTO: 24.9 PG (ref 27–31)
MCHC RBC AUTO-ENTMCNC: 31.5 G/DL (ref 32–36)
MCV RBC AUTO: 79 FL (ref 82–98)
MICROSCOPIC COMMENT: ABNORMAL
MONOCYTES # BLD AUTO: 0.4 K/UL (ref 0.3–1)
MONOCYTES NFR BLD: 8 % (ref 4–15)
NEUTROPHILS # BLD AUTO: 3.7 K/UL (ref 1.8–7.7)
NEUTROPHILS NFR BLD: 74.4 % (ref 38–73)
NITRITE UR QL STRIP: NEGATIVE
NRBC BLD-RTO: 0 /100 WBC
PH UR STRIP: 7 [PH] (ref 5–8)
PLATELET # BLD AUTO: 282 K/UL (ref 150–450)
PMV BLD AUTO: 9.4 FL (ref 9.2–12.9)
POTASSIUM SERPL-SCNC: 3.8 MMOL/L (ref 3.5–5.1)
PROT SERPL-MCNC: 7.2 G/DL (ref 6–8.4)
PROT UR QL STRIP: ABNORMAL
RBC # BLD AUTO: 4.69 M/UL (ref 4–5.4)
RBC #/AREA URNS HPF: 0 /HPF (ref 0–4)
SODIUM SERPL-SCNC: 137 MMOL/L (ref 136–145)
SP GR UR STRIP: >=1.03 (ref 1–1.03)
URN SPEC COLLECT METH UR: ABNORMAL
UROBILINOGEN UR STRIP-ACNC: 1 EU/DL
WBC # BLD AUTO: 5.01 K/UL (ref 3.9–12.7)
WBC #/AREA URNS HPF: 15 /HPF (ref 0–5)

## 2021-12-20 PROCEDURE — 80053 COMPREHEN METABOLIC PANEL: CPT | Performed by: NURSE PRACTITIONER

## 2021-12-20 PROCEDURE — 85025 COMPLETE CBC W/AUTO DIFF WBC: CPT | Performed by: NURSE PRACTITIONER

## 2021-12-20 PROCEDURE — 84702 CHORIONIC GONADOTROPIN TEST: CPT | Performed by: NURSE PRACTITIONER

## 2021-12-20 PROCEDURE — 87086 URINE CULTURE/COLONY COUNT: CPT | Performed by: NURSE PRACTITIONER

## 2021-12-20 PROCEDURE — 99284 EMERGENCY DEPT VISIT MOD MDM: CPT | Mod: 25

## 2021-12-20 PROCEDURE — 81000 URINALYSIS NONAUTO W/SCOPE: CPT | Performed by: NURSE PRACTITIONER

## 2021-12-20 PROCEDURE — 25000003 PHARM REV CODE 250: Performed by: NURSE PRACTITIONER

## 2021-12-20 RX ORDER — ACETAMINOPHEN 325 MG/1
650 TABLET ORAL
Status: COMPLETED | OUTPATIENT
Start: 2021-12-20 | End: 2021-12-20

## 2021-12-20 RX ADMIN — ACETAMINOPHEN 650 MG: 325 TABLET ORAL at 09:12

## 2021-12-22 LAB
BACTERIA UR CULT: NORMAL
BACTERIA UR CULT: NORMAL

## 2021-12-29 ENCOUNTER — HOSPITAL ENCOUNTER (EMERGENCY)
Facility: HOSPITAL | Age: 26
Discharge: HOME | End: 2021-12-29
Attending: EMERGENCY MEDICINE
Payer: MEDICAID

## 2021-12-29 VITALS
BODY MASS INDEX: 47.86 KG/M2 | WEIGHT: 293 LBS | HEART RATE: 106 BPM | DIASTOLIC BLOOD PRESSURE: 68 MMHG | TEMPERATURE: 101 F | RESPIRATION RATE: 18 BRPM | SYSTOLIC BLOOD PRESSURE: 156 MMHG | OXYGEN SATURATION: 100 %

## 2021-12-29 DIAGNOSIS — U07.1 COVID-19: Primary | ICD-10-CM

## 2021-12-29 LAB
CTP QC/QA: YES
SARS-COV-2 RDRP RESP QL NAA+PROBE: POSITIVE

## 2021-12-29 PROCEDURE — 25000003 PHARM REV CODE 250: Mod: ER | Performed by: EMERGENCY MEDICINE

## 2021-12-29 PROCEDURE — U0002 COVID-19 LAB TEST NON-CDC: HCPCS | Mod: ER | Performed by: EMERGENCY MEDICINE

## 2021-12-29 PROCEDURE — 99282 EMERGENCY DEPT VISIT SF MDM: CPT | Mod: 25,ER

## 2021-12-29 RX ORDER — IBUPROFEN 200 MG
600 TABLET ORAL
Status: COMPLETED | OUTPATIENT
Start: 2021-12-29 | End: 2021-12-29

## 2021-12-29 RX ORDER — ACETAMINOPHEN 500 MG
1000 TABLET ORAL
Status: COMPLETED | OUTPATIENT
Start: 2021-12-29 | End: 2021-12-29

## 2021-12-29 RX ADMIN — IBUPROFEN 600 MG: 200 TABLET, FILM COATED ORAL at 08:12

## 2021-12-29 RX ADMIN — ACETAMINOPHEN 1000 MG: 500 TABLET ORAL at 08:12

## 2021-12-30 NOTE — ED PROVIDER NOTES
Encounter Date: 2021       History     Chief Complaint   Patient presents with    Fever     X 3 days. C/c/c. +sore throat.      Underlying history of multiple miscarriages, she has 1 child at home and has had 5 miscarriages total.  Her most recent miscarriage was 1 week ago at about 10 weeks gestation, treated at [facility] with D&C and one night in the hospital.  She is undergoing workup for multiple miscarriages and has further follow-up scheduled next month.  She received 2 doses of Pfizer coronavirus vaccine earlier this , never tested positive in the past.  She now has symptoms, today is day 3, including mild subjective fever, mild cough, mild headache, mild sore throat.  No dyspnea or change in sense of smell or taste.  Vaginal bleeding after D&C as expected, improving.  No other complaints.    The history is provided by the patient. No  was used.     Review of patient's allergies indicates:   Allergen Reactions    Penicillins Hives    Vancomycin analogues      Past Medical History:   Diagnosis Date    Acanthosis nigricans 3/27/2014    Anemia     Anxiety     Miscarriage within last 12 months     Obesity      Past Surgical History:   Procedure Laterality Date     SECTION      x1    DILATION AND CURETTAGE OF UTERUS      x2    DILATION AND CURETTAGE OF UTERUS USING SUCTION N/A 3/2/2020    Procedure: DILATION AND CURETTAGE, UTERUS, USING SUCTION;  Surgeon: Sherlyn Jesus MD;  Location: [facility] OR;  Service: OB/GYN;  Laterality: N/A;    DILATION AND CURETTAGE OF UTERUS USING SUCTION N/A 2020    Procedure: DILATION AND CURETTAGE, UTERUS, USING SUCTION;  Surgeon: Violetta Pak MD;  Location: [facility] OR;  Service: OB/GYN;  Laterality: N/A;     History reviewed. No pertinent family history.  Social History     Tobacco Use    Smoking status: Former Smoker    Smokeless tobacco: Never Used   Substance Use Topics    Alcohol use: No    Drug use: No      Review of Systems   Constitutional: Positive for fever. Negative for activity change and fatigue.   HENT: Positive for sore throat. Negative for congestion, ear pain, facial swelling, nosebleeds and sinus pressure.    Eyes: Negative for pain, discharge, redness and visual disturbance.   Respiratory: Negative for cough, choking, chest tightness, shortness of breath and wheezing.    Cardiovascular: Negative for chest pain, palpitations and leg swelling.   Gastrointestinal: Negative for abdominal distention, abdominal pain, nausea and vomiting.   Endocrine: Negative for heat intolerance, polydipsia and polyuria.   Genitourinary: Positive for vaginal bleeding. Negative for difficulty urinating, dysuria, flank pain, hematuria and urgency.        See HPI   Musculoskeletal: Negative for back pain, gait problem, joint swelling and myalgias.   Skin: Negative for color change and rash.   Allergic/Immunologic: Negative for environmental allergies and food allergies.   Neurological: Positive for headaches. Negative for dizziness, weakness and numbness.   Hematological: Negative for adenopathy. Does not bruise/bleed easily.   Psychiatric/Behavioral: Negative for agitation and behavioral problems. The patient is not nervous/anxious.    All other systems reviewed and are negative.      Physical Exam     Initial Vitals   BP Pulse Resp Temp SpO2   12/29/21 1830 12/29/21 1830 12/29/21 1830 12/29/21 1830 12/29/21 1831   (!) 170/69 (!) 129 18 (!) 101.1 °F (38.4 °C) (!) 94 %      MAP       --                Physical Exam    Nursing note and vitals reviewed.  Constitutional: She appears well-developed and well-nourished. She is not diaphoretic. No distress.   By mildly obese, febrile, mildly tachycardic, no distress.  Note tachypnea or dyspnea.  Monitored room air pulse ox during my interview, persistently %.   HENT:   Head: Normocephalic and atraumatic.   Mouth/Throat: No oropharyngeal exudate.   Eyes: Conjunctivae and EOM are  normal. Pupils are equal, round, and reactive to light. Right eye exhibits no discharge. Left eye exhibits no discharge. No scleral icterus.   Neck: Neck supple. No thyromegaly present. No tracheal deviation present. No JVD present.   Normal range of motion.  Cardiovascular: Regular rhythm, normal heart sounds and intact distal pulses. Exam reveals no gallop and no friction rub.    No murmur heard.  Mild tachycardia   Pulmonary/Chest: Breath sounds normal. No stridor. No respiratory distress. She has no wheezes. She has no rhonchi. She has no rales. She exhibits no tenderness.   Abdominal: Abdomen is soft. Bowel sounds are normal. She exhibits no distension and no mass. There is no abdominal tenderness. There is no rebound and no guarding.   Musculoskeletal:         General: No tenderness or edema. Normal range of motion.      Cervical back: Normal range of motion and neck supple.     Neurological: She is alert and oriented to person, place, and time. She has normal strength.   Skin: Skin is warm and dry. No rash and no abscess noted. No erythema.   Psychiatric: She has a normal mood and affect. Her behavior is normal. Judgment and thought content normal.         ED Course   Procedures  Labs Reviewed   SARS-COV-2 RDRP GENE - Abnormal; Notable for the following components:       Result Value    POC Rapid COVID Positive (*)     All other components within normal limits       8:11 PM Counseled in detail regarding new diagnosis of COVID-19.  She is oxygenating normally and is appropriate for home self-care,  on home pulse oximetry monitoring.         Imaging Results    None          Medications   acetaminophen tablet 1,000 mg (has no administration in time range)   ibuprofen tablet 600 mg (has no administration in time range)                          Clinical Impression:   Final diagnoses:  [U07.1] COVID-19 (Primary)          ED Disposition Condition    Discharge Stable        ED Prescriptions     None         Follow-up Information     Follow up With Specialties Details Why Contact Info    Your Primary Care Doctor  In 2 days      [facility] - Emergency Dept Emergency Medicine  As needed [id number]  [facility] [id number] [phone]           Eyal Hinkle MD  12/29/21 2012

## 2021-12-30 NOTE — DISCHARGE INSTRUCTIONS
Home self-care and isolation as per guidelines.  Today is day 3 of COVID, continued to monitor her the time course, risk.  It is as long as 14 days.  Monitor home pulse ox as discussed, return if less than 94%.  Keep follow-up gynecology/ obstetrical appointment.

## 2022-05-31 NOTE — ED PROVIDER NOTES
SCRIBE #1 NOTE: I, Reddy Nathan, am scribing for, and in the presence of, Endy Flannery MD. I have scribed the HPI, ROS, and PEx.     SCRIBE #2 NOTE: I, Sheridan Yepez, am scribing for, and in the presence of,  Ameya Donahue Jr., MD. I have scribed the remaining portions of the note not scribed by Scribe #1.     History      Chief Complaint   Patient presents with    Vaginal Bleeding     started 1 hr ago, pt states she thinks shes having a miscarriage. found out monday she was pregnant, unknown gestation.        Review of patient's allergies indicates:   Allergen Reactions    Penicillins Hives    Vancomycin analogues         HPI   HPI    2020, 12:20 PM   History obtained from the patient      History of Present Illness: Lise Qureshi is a 24 y.o. female patient who presents to the Emergency Department for vaginal bleeding, onset 1 hour PTA. Pt states that she recently had 2 positive home pregnancy tests, LKMP 20 (G4:P1:A2). Symptoms are constant and moderate in severity. No mitigating or exacerbating factors reported. Associated sxs include lower abdominal cramping. Patient denies any fever, chills, n/v/d, SOB, CP, weakness, numbness, dizziness, headache, and all other sxs at this time. No prior Tx reported. No further complaints or concerns at this time.     Arrival mode: Personal vehicle    PCP: Primary Doctor No       Past Medical History:  Past Medical History:   Diagnosis Date    Acanthosis nigricans 3/27/2014    Anxiety     Miscarriage within last 12 months     Obesity        Past Surgical History:  Past Surgical History:   Procedure Laterality Date     SECTION      DILATION AND CURETTAGE OF UTERUS      DILATION AND CURETTAGE OF UTERUS USING SUCTION N/A 3/2/2020    Procedure: DILATION AND CURETTAGE, UTERUS, USING SUCTION;  Surgeon: Sherlyn Jesus MD;  Location: [facility] OR;  Service: OB/GYN;  Laterality: N/A;         Family History:  History reviewed. No pertinent  family history.    Social History:  Social History     Tobacco Use    Smoking status: Former Smoker    Smokeless tobacco: Never Used   Substance and Sexual Activity    Alcohol use: No    Drug use: No    Sexual activity: Yes     Partners: Male       ROS   Review of Systems   Constitutional: Negative for chills and fever.   HENT: Negative for sore throat.    Respiratory: Negative for shortness of breath.    Cardiovascular: Negative for chest pain.   Gastrointestinal: Positive for abdominal pain (lower cramping). Negative for diarrhea, nausea and vomiting.   Genitourinary: Positive for vaginal bleeding. Negative for dysuria.   Musculoskeletal: Negative for back pain.   Skin: Negative for rash.   Neurological: Negative for dizziness, seizures, weakness, light-headedness, numbness and headaches.   Hematological: Does not bruise/bleed easily.   All other systems reviewed and are negative.    Physical Exam      Initial Vitals [07/30/20 1121]   BP Pulse Resp Temp SpO2   (!) 148/95 101 20 98.9 °F (37.2 °C) 98 %      MAP       --          Physical Exam  Nursing Notes and Vital Signs Reviewed.  Constitutional: Patient is in no acute distress. Well-developed and well-nourished.  Head: Atraumatic. Normocephalic.  Eyes: PERRL. EOM intact. Conjunctivae are not pale. No scleral icterus.  ENT: Mucous membranes are moist. Oropharynx is clear and symmetric.    Neck: Supple. Full ROM. No lymphadenopathy.  Cardiovascular: Regular rate. Regular rhythm. No murmurs, rubs, or gallops. Distal pulses are 2+ and symmetric.  Pulmonary/Chest: No respiratory distress. Clear to auscultation bilaterally. No wheezing or rales.  Abdominal: Soft and non-distended.  There is no tenderness.  No rebound, guarding, or rigidity.   Pelvic: A female chaperone was present for this examination. Nl external inspection. No lesions or abnormalities were visible on the labia majora or minora. Cervical os is closed. There is no CMT. There is a moderate amount  "of blood in the vaginal vault. No adnexal tenderness. No adnexal masses.  Musculoskeletal: Moves all extremities. No obvious deformities. No edema.  Skin: Warm and dry.  Neurological:  Alert, awake, and appropriate.  Normal speech.  No acute focal neurological deficits are appreciated.  Psychiatric: Normal affect. Good eye contact. Appropriate in content.    ED Course    Procedures  ED Vital Signs:  Vitals:    07/30/20 1121 07/30/20 1205 07/30/20 1230 07/30/20 1236   BP: (!) 148/95 (!) 143/78 (!) 142/68    Pulse: 101 80 85    Resp: 20 19 18 17   Temp: 98.9 °F (37.2 °C)      TempSrc: Oral      SpO2: 98% 100% 100%    Weight: 136 kg (299 lb 13.2 oz)      Height: 5' 8" (1.727 m)       07/30/20 1305 07/30/20 1315 07/30/20 1400 07/30/20 1445   BP:  134/69 138/60 137/64   Pulse: 78 78 75 80   Resp: 19 17 17 18   Temp:       TempSrc:       SpO2: 100% 100% 100% 100%   Weight:       Height:        07/30/20 1515 07/30/20 1545 07/30/20 1615   BP: 121/66 132/62 (!) 142/76   Pulse: 87 87 83   Resp: 18 20 16   Temp:      TempSrc:      SpO2: 100% 100% 100%   Weight:      Height:          Abnormal Lab Results:  Labs Reviewed   CBC W/ AUTO DIFFERENTIAL - Abnormal; Notable for the following components:       Result Value    Hemoglobin 9.7 (*)     Hematocrit 34.5 (*)     Mean Corpuscular Volume 74 (*)     Mean Corpuscular Hemoglobin 20.8 (*)     Mean Corpuscular Hemoglobin Conc 28.1 (*)     RDW 19.9 (*)     Platelets 397 (*)     Gran% 77.0 (*)     Lymph% 15.6 (*)     All other components within normal limits   COMPREHENSIVE METABOLIC PANEL - Abnormal; Notable for the following components:    Sodium 135 (*)     CO2 22 (*)     Glucose 112 (*)     Albumin 3.2 (*)     All other components within normal limits   HCG, QUANTITATIVE, PREGNANCY   TYPE & SCREEN        All Lab Results:  Results for orders placed or performed during the hospital encounter of 07/30/20   CBC auto differential   Result Value Ref Range    WBC 8.51 3.90 - 12.70 K/uL    " RBC 4.66 4.00 - 5.40 M/uL    Hemoglobin 9.7 (L) 12.0 - 16.0 g/dL    Hematocrit 34.5 (L) 37.0 - 48.5 %    Mean Corpuscular Volume 74 (L) 82 - 98 fL    Mean Corpuscular Hemoglobin 20.8 (L) 27.0 - 31.0 pg    Mean Corpuscular Hemoglobin Conc 28.1 (L) 32.0 - 36.0 g/dL    RDW 19.9 (H) 11.5 - 14.5 %    Platelets 397 (H) 150 - 350 K/uL    MPV 10.0 9.2 - 12.9 fL    Immature Granulocytes 0.4 0.0 - 0.5 %    Gran # (ANC) 6.6 1.8 - 7.7 K/uL    Immature Grans (Abs) 0.03 0.00 - 0.04 K/uL    Lymph # 1.3 1.0 - 4.8 K/uL    Mono # 0.6 0.3 - 1.0 K/uL    Eos # 0.0 0.0 - 0.5 K/uL    Baso # 0.02 0.00 - 0.20 K/uL    nRBC 0 0 /100 WBC    Gran% 77.0 (H) 38.0 - 73.0 %    Lymph% 15.6 (L) 18.0 - 48.0 %    Mono% 6.7 4.0 - 15.0 %    Eosinophil% 0.1 0.0 - 8.0 %    Basophil% 0.2 0.0 - 1.9 %    Differential Method Automated    Comprehensive metabolic panel   Result Value Ref Range    Sodium 135 (L) 136 - 145 mmol/L    Potassium 3.8 3.5 - 5.1 mmol/L    Chloride 104 95 - 110 mmol/L    CO2 22 (L) 23 - 29 mmol/L    Glucose 112 (H) 70 - 110 mg/dL    BUN, Bld 10 6 - 20 mg/dL    Creatinine 0.7 0.5 - 1.4 mg/dL    Calcium 9.1 8.7 - 10.5 mg/dL    Total Protein 7.1 6.0 - 8.4 g/dL    Albumin 3.2 (L) 3.5 - 5.2 g/dL    Total Bilirubin 0.3 0.1 - 1.0 mg/dL    Alkaline Phosphatase 79 55 - 135 U/L    AST 15 10 - 40 U/L    ALT 15 10 - 44 U/L    Anion Gap 9 8 - 16 mmol/L    eGFR if African American >60 >60 mL/min/1.73 m^2    eGFR if non African American >60 >60 mL/min/1.73 m^2   hCG, quantitative, pregnancy   Result Value Ref Range    hCG Quant [id number] See Text mIU/mL   Type & Screen   Result Value Ref Range    Group & Rh O POS     Indirect Martin NEG      Imaging Results:  Imaging Results          US OB <14 Wks TransAbd & TransVag, Single Gestation (XPD) (Final result)  Result time 07/30/20 15:17:14   Procedure changed from US OB <14 Wks, TransAbd, Single Gestation     Final result by Ortega Martines MD (07/30/20 15:17:14)                 Impression:       Thickened heterogeneous fundal endometrium.  No IUP identified.  No ectopic pregnancy identified.  Possible miscarriage.  Consider short-term follow-up.      Electronically signed by: Ortega Martines MD  Date:    07/30/2020  Time:    15:17             Narrative:    EXAMINATION:  US OB <14 WEEKS, TRANSABDOM & TRANSVAG, SINGLE GESTATION (XPD)    CLINICAL HISTORY:  Bleeding/cramping; Hemorrhage in early pregnancy, unspecified    TECHNIQUE:  Transabdominal sonography of the pelvis was performed, followed by transvaginal sonography to better evaluate the uterus and ovaries.    COMPARISON:  None.    FINDINGS:  Technically difficult study due to patient's body habitus.    Transabdominal and endovaginal ultrasound was performed.    The uterus is 5.6 x 6.1 x 10.8 cm.  Endometrial stripe thickness is prominent at 2.2 cm.  No sac or fetal pole is identified.    The left ovary is 2.1 x 2.9 x 4.9 cm with a 2.3 cm cyst.  The right ovary is grossly normal at 1.6 x 2.1 x 3.2 cm.    No adnexal mass.  No free fluid.                                        The Emergency Provider reviewed the vital signs and test results, which are outlined above.    ED Discussion     4:09 PM: Dr. Flannery transfers care of patient to Dr. Donahue.    4:16 PM: Discussed pt's case with Dr. Violetta Pak ([facility]) who reports that she will come evaluate pt at ED bedside.    4:39 PM  Patient has been seen by Dr Violetta Pak in the ED.  Being admitted to OR for suction D/C.    4:41 PM: Discussed case with Dr. Violetta Pak ([facility]). Dr. Pak agrees with current care and management of pt and accepts admission.   Admitting Service: Hospital Medicine  Admit to: OR for suction D/C    Re-evaluated pt. I have discussed test results, shared treatment plan, and the need for admission with patient and family at bedside. Pt and family express understanding at this time and agree with all information. All questions answered. Pt and family have no further  questions or concerns at this time. Pt is ready for admit.           ED Medication(s):  Medications   morphine injection 2 mg (2 mg Intravenous Given 20 1236)     New Prescriptions    No medications on file           Medical Decision Making    Medical Decision Making:   Clinical Tests:   Lab Tests: Ordered and Reviewed  Radiological Study: Ordered and Reviewed           Scribe Attestation:   Scribe #1: I performed the above scribed service and the documentation accurately describes the services I performed. I attest to the accuracy of the note.    Attending:   Physician Attestation Statement for Scribe #1: I, Endy Flannery MD, personally performed the services described in this documentation, as scribed by Reddy Nathan, in my presence, and it is both accurate and complete.       Scribe Attestation:   Scribe #2: I performed the above scribed service and the documentation accurately describes the services I performed. I attest to the accuracy of the note.    Attending Attestation:           Physician Attestation for Scribe:    Physician Attestation Statement for Scribe #2: I, Ameya Donahue Jr., MD, reviewed documentation, as scribed by Sheridan Yepez in my presence, and it is both accurate and complete. I also acknowledge and confirm the content of the note done by Scribe #1.          Clinical Impression       ICD-10-CM ICD-9-CM   1. Threatened   O20.0 640.00   2. Vaginal bleeding before 22 weeks gestation  O20.9 640.90   3. Incomplete   O03.4 637.91       Disposition:   Disposition: Admitted  Condition: Fair  To OR for Suction D&C         Ameya Donahue Jr., MD  20 2778     Booster status unknown

## 2022-11-09 ENCOUNTER — HOSPITAL ENCOUNTER (EMERGENCY)
Facility: HOSPITAL | Age: 27
Discharge: HOME | End: 2022-11-09
Attending: EMERGENCY MEDICINE
Payer: MEDICAID

## 2022-11-09 VITALS
SYSTOLIC BLOOD PRESSURE: 147 MMHG | WEIGHT: 293 LBS | DIASTOLIC BLOOD PRESSURE: 88 MMHG | HEIGHT: 69 IN | TEMPERATURE: 99 F | RESPIRATION RATE: 16 BRPM | BODY MASS INDEX: 43.4 KG/M2 | OXYGEN SATURATION: 99 % | HEART RATE: 93 BPM

## 2022-11-09 DIAGNOSIS — I10 HYPERTENSION, UNSPECIFIED TYPE: ICD-10-CM

## 2022-11-09 DIAGNOSIS — B34.9 VIRAL SYNDROME: Primary | ICD-10-CM

## 2022-11-09 LAB
B-HCG UR QL: NEGATIVE
BILIRUB UR QL STRIP: NEGATIVE
CLARITY UR REFRACT.AUTO: ABNORMAL
COLOR UR AUTO: YELLOW
CTP QC/QA: YES
CTP QC/QA: YES
GLUCOSE UR QL STRIP: NEGATIVE
HGB UR QL STRIP: NEGATIVE
KETONES UR QL STRIP: NEGATIVE
LEUKOCYTE ESTERASE UR QL STRIP: NEGATIVE
NITRITE UR QL STRIP: NEGATIVE
PH UR STRIP: 6 [PH] (ref 5–8)
POC MOLECULAR INFLUENZA A AGN: NEGATIVE
POC MOLECULAR INFLUENZA B AGN: NEGATIVE
PROT UR QL STRIP: NEGATIVE
SARS-COV-2 RDRP RESP QL NAA+PROBE: NEGATIVE
SP GR UR STRIP: 1.01 (ref 1–1.03)
URN SPEC COLLECT METH UR: ABNORMAL
UROBILINOGEN UR STRIP-ACNC: NEGATIVE EU/DL

## 2022-11-09 PROCEDURE — 81003 URINALYSIS AUTO W/O SCOPE: CPT | Mod: ER | Performed by: EMERGENCY MEDICINE

## 2022-11-09 PROCEDURE — 87491 CHLMYD TRACH DNA AMP PROBE: CPT | Performed by: EMERGENCY MEDICINE

## 2022-11-09 PROCEDURE — 87635 SARS-COV-2 COVID-19 AMP PRB: CPT | Mod: ER | Performed by: EMERGENCY MEDICINE

## 2022-11-09 PROCEDURE — 87591 N.GONORRHOEAE DNA AMP PROB: CPT | Performed by: EMERGENCY MEDICINE

## 2022-11-09 PROCEDURE — 99282 EMERGENCY DEPT VISIT SF MDM: CPT | Mod: ER

## 2022-11-09 PROCEDURE — 81025 URINE PREGNANCY TEST: CPT | Mod: ER | Performed by: EMERGENCY MEDICINE

## 2022-11-09 PROCEDURE — 87502 INFLUENZA DNA AMP PROBE: CPT | Mod: ER

## 2022-11-09 NOTE — ED PROVIDER NOTES
Encounter Date: 2022       History     Chief Complaint   Patient presents with    Fever     Fever and nausea, onset yesterday      The history is provided by the patient.   Fever  Primary symptoms of the febrile illness include fever and nausea. Primary symptoms do not include fatigue, headaches, cough, wheezing, shortness of breath, abdominal pain, vomiting, diarrhea, dysuria, myalgias or rash. The current episode started yesterday. This is a new problem. The problem has not changed since onset.  Nausea began yesterday. The nausea is associated with eating. The nausea is exacerbated by food. Pt did not check temp with thermometer, and reports chills.    Review of patient's allergies indicates:   Allergen Reactions    Penicillins Hives    Vancomycin analogues      Past Medical History:   Diagnosis Date    Acanthosis nigricans 3/27/2014    Anemia     Anxiety     Miscarriage within last 12 months     Obesity      Past Surgical History:   Procedure Laterality Date     SECTION      x1    DILATION AND CURETTAGE OF UTERUS      x2    DILATION AND CURETTAGE OF UTERUS USING SUCTION N/A 3/2/2020    Procedure: DILATION AND CURETTAGE, UTERUS, USING SUCTION;  Surgeon: Sherlyn Jesus MD;  Location: [facility] OR;  Service: OB/GYN;  Laterality: N/A;    DILATION AND CURETTAGE OF UTERUS USING SUCTION N/A 2020    Procedure: DILATION AND CURETTAGE, UTERUS, USING SUCTION;  Surgeon: Violetta Pak MD;  Location: [facility] OR;  Service: OB/GYN;  Laterality: N/A;     No family history on file.  Social History     Tobacco Use    Smoking status: Former    Smokeless tobacco: Never   Substance Use Topics    Alcohol use: No    Drug use: No     Review of Systems   Constitutional:  Positive for fever. Negative for fatigue.   HENT:  Negative for congestion.    Respiratory:  Negative for cough, shortness of breath and wheezing.    Gastrointestinal:  Positive for nausea. Negative for abdominal pain, diarrhea and vomiting.    Genitourinary:  Positive for frequency. Negative for dysuria, penile pain, urgency and vaginal pain.   Musculoskeletal:  Negative for myalgias.   Skin:  Negative for rash.   Neurological:  Negative for headaches.     Physical Exam     Initial Vitals [11/09/22 1107]   BP Pulse Resp Temp SpO2   (!) 147/88 93 16 98.6 °F (37 °C) 99 %      MAP       --         Physical Exam    Nursing note and vitals reviewed.  Constitutional: She appears well-developed and well-nourished. No distress.   HENT:   Head: Normocephalic and atraumatic.   Mouth/Throat: Oropharynx is clear and moist.   Eyes: Conjunctivae and EOM are normal. Pupils are equal, round, and reactive to light.   Neck: Neck supple.   Normal range of motion.  Cardiovascular:  Normal rate, regular rhythm and normal heart sounds.           Pulmonary/Chest: Breath sounds normal. No respiratory distress.   Abdominal: Abdomen is soft. Bowel sounds are normal. She exhibits no distension. There is no abdominal tenderness.   Musculoskeletal:         General: Normal range of motion.      Cervical back: Normal range of motion and neck supple.     Neurological: She is alert and oriented to person, place, and time. She has normal strength.   Skin: Skin is warm and dry.   Psychiatric: She has a normal mood and affect. Thought content normal.       ED Course   Procedures  Labs Reviewed   URINALYSIS, REFLEX TO URINE CULTURE - Abnormal; Notable for the following components:       Result Value    Appearance, UA Hazy (*)     All other components within normal limits    Narrative:     Specimen Source->Urine   C. TRACHOMATIS/N. GONORRHOEAE BY AMP DNA   PREGNANCY TEST, URINE RAPID   SARS-COV-2 RDRP GENE   POCT INFLUENZA A/B MOLECULAR     Results for orders placed or performed during the hospital encounter of 11/09/22   Urinalysis, Reflex to Urine Culture Urine, Clean Catch    Specimen: Urine   Result Value Ref Range    Specimen UA Urine, Clean Catch     Color, UA Yellow Yellow, Straw,  Katherine    Appearance, UA Hazy (A) Clear    pH, UA 6.0 5.0 - 8.0    Specific Gravity, UA 1.015 1.005 - 1.030    Protein, UA Negative Negative    Glucose, UA Negative Negative    Ketones, UA Negative Negative    Bilirubin (UA) Negative Negative    Occult Blood UA Negative Negative    Nitrite, UA Negative Negative    Urobilinogen, UA Negative <2.0 EU/dL    Leukocytes, UA Negative Negative   Pregnancy, urine rapid   Result Value Ref Range    Preg Test, Ur Negative    POCT COVID-19 Rapid Screening   Result Value Ref Range    POC Rapid COVID Negative Negative     Acceptable Yes    POCT Influenza A/B Molecular   Result Value Ref Range    POC Molecular Influenza A Ag Negative Negative, Not Reported    POC Molecular Influenza B Ag Negative Negative, Not Reported     Acceptable Yes             Imaging Results    None     12:11 PM - Counseling: Spoke with the patient and discussed todays findings, in addition to providing specific details for the plan of care and counseling regarding the diagnosis and prognosis. Questions are answered at this time.    Pre-hypertension/Hypertension: The pt has been informed that they may have pre-hypertension or hypertension based on a blood pressure reading in the ED. I recommend that the pt call the PCP listed on their discharge instructions or a physician of their choice this week to arrange f/u for further evaluation of possible pre-hypertension or hypertension.         Medications - No data to display                           Clinical Impression:   Final diagnoses:  [I10] Hypertension, unspecified type  [B34.9] Viral syndrome (Primary)      ED Disposition Condition    Discharge Stable          ED Prescriptions    None       Follow-up Information       Follow up With Specialties Details Why Contact Info    PCP  Call in 2 days      [facility] - Emergency Dept Emergency Medicine  If symptoms worsen [id number]  [facility] [id number] [phone]              Tor Levy MD  11/09/22 4704

## 2022-11-10 LAB
C TRACH DNA SPEC QL NAA+PROBE: NOT DETECTED
N GONORRHOEA DNA SPEC QL NAA+PROBE: NOT DETECTED

## 2023-02-13 ENCOUNTER — HOSPITAL ENCOUNTER (EMERGENCY)
Facility: HOSPITAL | Age: 28
Discharge: HOME | End: 2023-02-14
Attending: EMERGENCY MEDICINE
Payer: MEDICAID

## 2023-02-13 DIAGNOSIS — L50.9 HIVES: ICD-10-CM

## 2023-02-13 DIAGNOSIS — R21 RASH: ICD-10-CM

## 2023-02-13 DIAGNOSIS — T78.40XA ALLERGIC REACTION, INITIAL ENCOUNTER: Primary | ICD-10-CM

## 2023-02-13 LAB
ALBUMIN SERPL BCP-MCNC: 3.7 G/DL (ref 3.5–5.2)
ALP SERPL-CCNC: 83 U/L (ref 55–135)
ALT SERPL W/O P-5'-P-CCNC: 16 U/L (ref 10–44)
ANION GAP SERPL CALC-SCNC: 10 MMOL/L (ref 8–16)
AST SERPL-CCNC: 17 U/L (ref 10–40)
BASOPHILS # BLD AUTO: 0.03 K/UL (ref 0–0.2)
BASOPHILS NFR BLD: 0.4 % (ref 0–1.9)
BILIRUB SERPL-MCNC: 0.3 MG/DL (ref 0.1–1)
BUN SERPL-MCNC: 14 MG/DL (ref 6–20)
CALCIUM SERPL-MCNC: 9.1 MG/DL (ref 8.7–10.5)
CHLORIDE SERPL-SCNC: 107 MMOL/L (ref 95–110)
CO2 SERPL-SCNC: 22 MMOL/L (ref 23–29)
CREAT SERPL-MCNC: 0.7 MG/DL (ref 0.5–1.4)
DIFFERENTIAL METHOD: ABNORMAL
EOSINOPHIL # BLD AUTO: 0.1 K/UL (ref 0–0.5)
EOSINOPHIL NFR BLD: 1.7 % (ref 0–8)
ERYTHROCYTE [DISTWIDTH] IN BLOOD BY AUTOMATED COUNT: 16.5 % (ref 11.5–14.5)
EST. GFR  (NO RACE VARIABLE): >60 ML/MIN/1.73 M^2
GLUCOSE SERPL-MCNC: 82 MG/DL (ref 70–110)
HCT VFR BLD AUTO: 36.7 % (ref 37–48.5)
HGB BLD-MCNC: 11.6 G/DL (ref 12–16)
IMM GRANULOCYTES # BLD AUTO: 0.01 K/UL (ref 0–0.04)
IMM GRANULOCYTES NFR BLD AUTO: 0.1 % (ref 0–0.5)
LYMPHOCYTES # BLD AUTO: 3.2 K/UL (ref 1–4.8)
LYMPHOCYTES NFR BLD: 44.9 % (ref 18–48)
MCH RBC QN AUTO: 26 PG (ref 27–31)
MCHC RBC AUTO-ENTMCNC: 31.6 G/DL (ref 32–36)
MCV RBC AUTO: 82 FL (ref 82–98)
MONOCYTES # BLD AUTO: 0.6 K/UL (ref 0.3–1)
MONOCYTES NFR BLD: 9 % (ref 4–15)
NEUTROPHILS # BLD AUTO: 3.1 K/UL (ref 1.8–7.7)
NEUTROPHILS NFR BLD: 43.9 % (ref 38–73)
NRBC BLD-RTO: 0 /100 WBC
PLATELET # BLD AUTO: 376 K/UL (ref 150–450)
PMV BLD AUTO: 9.9 FL (ref 9.2–12.9)
POTASSIUM SERPL-SCNC: 3.9 MMOL/L (ref 3.5–5.1)
PROT SERPL-MCNC: 7.4 G/DL (ref 6–8.4)
RBC # BLD AUTO: 4.46 M/UL (ref 4–5.4)
SODIUM SERPL-SCNC: 139 MMOL/L (ref 136–145)
WBC # BLD AUTO: 7.15 K/UL (ref 3.9–12.7)

## 2023-02-13 PROCEDURE — 96361 HYDRATE IV INFUSION ADD-ON: CPT | Mod: ER

## 2023-02-13 PROCEDURE — 25000003 PHARM REV CODE 250: Mod: ER | Performed by: EMERGENCY MEDICINE

## 2023-02-13 PROCEDURE — 96375 TX/PRO/DX INJ NEW DRUG ADDON: CPT | Mod: ER

## 2023-02-13 PROCEDURE — 87389 HIV-1 AG W/HIV-1&-2 AB AG IA: CPT | Performed by: EMERGENCY MEDICINE

## 2023-02-13 PROCEDURE — 86803 HEPATITIS C AB TEST: CPT | Performed by: EMERGENCY MEDICINE

## 2023-02-13 PROCEDURE — 63600175 PHARM REV CODE 636 W HCPCS: Mod: ER | Performed by: EMERGENCY MEDICINE

## 2023-02-13 PROCEDURE — 80053 COMPREHEN METABOLIC PANEL: CPT | Mod: ER | Performed by: EMERGENCY MEDICINE

## 2023-02-13 PROCEDURE — 96374 THER/PROPH/DIAG INJ IV PUSH: CPT | Mod: ER

## 2023-02-13 PROCEDURE — 99284 EMERGENCY DEPT VISIT MOD MDM: CPT | Mod: 25,ER

## 2023-02-13 PROCEDURE — 85025 COMPLETE CBC W/AUTO DIFF WBC: CPT | Mod: ER | Performed by: EMERGENCY MEDICINE

## 2023-02-13 RX ORDER — METHYLPREDNISOLONE SOD SUCC 125 MG
125 VIAL (EA) INJECTION
Status: COMPLETED | OUTPATIENT
Start: 2023-02-13 | End: 2023-02-13

## 2023-02-13 RX ORDER — DIPHENHYDRAMINE HYDROCHLORIDE 50 MG/ML
25 INJECTION INTRAMUSCULAR; INTRAVENOUS
Status: COMPLETED | OUTPATIENT
Start: 2023-02-13 | End: 2023-02-13

## 2023-02-13 RX ORDER — FAMOTIDINE 10 MG/ML
20 INJECTION INTRAVENOUS
Status: COMPLETED | OUTPATIENT
Start: 2023-02-13 | End: 2023-02-13

## 2023-02-13 RX ADMIN — SODIUM CHLORIDE 1000 ML: 9 INJECTION, SOLUTION INTRAVENOUS at 09:02

## 2023-02-13 RX ADMIN — DIPHENHYDRAMINE HYDROCHLORIDE 25 MG: 50 INJECTION INTRAMUSCULAR; INTRAVENOUS at 09:02

## 2023-02-13 RX ADMIN — METHYLPREDNISOLONE SODIUM SUCCINATE 125 MG: 125 INJECTION, POWDER, FOR SOLUTION INTRAMUSCULAR; INTRAVENOUS at 09:02

## 2023-02-13 RX ADMIN — FAMOTIDINE 20 MG: 10 INJECTION, SOLUTION INTRAVENOUS at 09:02

## 2023-02-14 VITALS
DIASTOLIC BLOOD PRESSURE: 90 MMHG | BODY MASS INDEX: 43.4 KG/M2 | HEIGHT: 69 IN | HEART RATE: 80 BPM | OXYGEN SATURATION: 99 % | RESPIRATION RATE: 18 BRPM | TEMPERATURE: 99 F | SYSTOLIC BLOOD PRESSURE: 147 MMHG | WEIGHT: 293 LBS

## 2023-02-14 PROBLEM — R21 RASH: Status: ACTIVE | Noted: 2023-02-14

## 2023-02-14 PROBLEM — T78.40XA ALLERGIC REACTION: Status: ACTIVE | Noted: 2023-02-14

## 2023-02-14 PROBLEM — L50.9 HIVES: Status: ACTIVE | Noted: 2023-02-14

## 2023-02-14 LAB
HCV AB SERPL QL IA: NEGATIVE
HEP C VIRUS HOLD SPECIMEN: NORMAL
HIV 1+2 AB+HIV1 P24 AG SERPL QL IA: NEGATIVE

## 2023-02-14 RX ORDER — METHYLPREDNISOLONE 4 MG/1
TABLET ORAL
Qty: 1 EACH | Refills: 0 | Status: SHIPPED | OUTPATIENT
Start: 2023-02-14

## 2023-02-14 RX ORDER — FAMOTIDINE 20 MG/1
20 TABLET, FILM COATED ORAL 2 TIMES DAILY
Qty: 20 TABLET | Refills: 0 | Status: SHIPPED | OUTPATIENT
Start: 2023-02-14 | End: 2024-02-14

## 2023-02-14 RX ORDER — EPINEPHRINE 0.3 MG/.3ML
1 INJECTION SUBCUTANEOUS
Qty: 1 EACH | Refills: 1 | Status: SHIPPED | OUTPATIENT
Start: 2023-02-14 | End: 2024-02-14

## 2023-02-14 RX ORDER — DIPHENHYDRAMINE HCL 25 MG
25 CAPSULE ORAL EVERY 6 HOURS PRN
Qty: 20 CAPSULE | Refills: 0 | Status: SHIPPED | OUTPATIENT
Start: 2023-02-14

## 2023-02-14 NOTE — ED PROVIDER NOTES
Encounter Date: 2023       History     Chief Complaint   Patient presents with    Allergic Reaction     Hives, itching, SOB, onset last night after taking naproxen , last OTC benadryl, pt also states that she started using a new washing powder     Diffuse rash after taking naproxen yesterday.  No airway issues.      The history is provided by the patient.   Allergic Reaction  The primary symptoms are  rash and urticaria. The primary symptoms do not include wheezing, shortness of breath, cough, abdominal pain, nausea, vomiting, diarrhea, dizziness, palpitations, altered mental status or angioedema. The current episode started yesterday. The problem has not changed since onset.This is a new problem.   The rash began yesterday. Location: back, chest, bilateral UE. The rash is associated with itching. The rash is not associated with blisters or weeping. Risk factors for rash include new medications (took otc naproxen prior to rash forming and diffuse itching).   The onset of the reaction was associated with a new medication. Significant symptoms also include itching. Significant symptoms that are not present include eye redness, flushing or rhinorrhea.   Review of patient's allergies indicates:   Allergen Reactions    Penicillins Hives    Vancomycin analogues      Past Medical History:   Diagnosis Date    Acanthosis nigricans 3/27/2014    Anemia     Anxiety     Miscarriage within last 12 months     Obesity      Past Surgical History:   Procedure Laterality Date     SECTION      x1    DILATION AND CURETTAGE OF UTERUS      x2    DILATION AND CURETTAGE OF UTERUS USING SUCTION N/A 3/2/2020    Procedure: DILATION AND CURETTAGE, UTERUS, USING SUCTION;  Surgeon: Sherlyn Jesus MD;  Location: [facility] OR;  Service: OB/GYN;  Laterality: N/A;    DILATION AND CURETTAGE OF UTERUS USING SUCTION N/A 2020    Procedure: DILATION AND CURETTAGE, UTERUS, USING SUCTION;  Surgeon: Violetta Pak MD;  Location: [facility]  OR;  Service: OB/GYN;  Laterality: N/A;     No family history on file.  Social History     Tobacco Use    Smoking status: Former    Smokeless tobacco: Never   Substance Use Topics    Alcohol use: No    Drug use: No     Review of Systems   Constitutional:  Negative for fever.   HENT:  Negative for rhinorrhea and sore throat.    Eyes:  Negative for redness.   Respiratory:  Negative for cough, shortness of breath and wheezing.    Cardiovascular:  Negative for chest pain and palpitations.   Gastrointestinal:  Negative for abdominal pain, diarrhea, nausea and vomiting.   Genitourinary:  Negative for dysuria.   Musculoskeletal:  Negative for back pain.   Skin:  Positive for itching and rash. Negative for flushing.   Neurological:  Negative for dizziness and weakness.   Hematological:  Does not bruise/bleed easily.   All other systems reviewed and are negative.    Physical Exam     Initial Vitals [02/13/23 1932]   BP Pulse Resp Temp SpO2   (!) 174/95 89 20 98.7 °F (37.1 °C) 100 %      MAP       --         Physical Exam    Nursing note and vitals reviewed.  Constitutional: She appears well-developed and well-nourished.   HENT:   Head: Normocephalic and atraumatic.   Mouth/Throat: No oropharyngeal exudate.   Eyes: Conjunctivae and EOM are normal. Pupils are equal, round, and reactive to light.   Neck: Neck supple. No thyromegaly present.   Normal range of motion.  Cardiovascular:  Normal rate, regular rhythm, normal heart sounds and intact distal pulses.     Exam reveals no gallop and no friction rub.       No murmur heard.  Pulmonary/Chest: Breath sounds normal. No respiratory distress. She has no wheezes. She has no rhonchi. She exhibits no tenderness.   Abdominal: Abdomen is soft. Bowel sounds are normal. She exhibits no distension. There is no abdominal tenderness. There is no rebound and no guarding.   Musculoskeletal:         General: No tenderness or edema. Normal range of motion.      Right wrist: Normal. Normal  pulse.      Left wrist: Normal. Normal pulse.      Right hand: Normal. Normal capillary refill. Normal pulse.      Left hand: Normal. Normal capillary refill. Normal pulse.      Cervical back: Normal range of motion and neck supple.     Lymphadenopathy:     She has no cervical adenopathy.   Neurological: She is alert and oriented to person, place, and time. She has normal strength. No cranial nerve deficit or sensory deficit. GCS score is 15. GCS eye subscore is 4. GCS verbal subscore is 5. GCS motor subscore is 6.   No acute focal neuro deficits   Skin: Skin is warm and dry. Capillary refill takes less than 2 seconds. Rash noted. Rash is maculopapular (diffuse on chest, back, bilateral upper ext) and urticarial.   No overt signs of infection. No active bleeding/discharge. No palpable fluctuance   Psychiatric: She has a normal mood and affect. Her behavior is normal. Judgment and thought content normal.       ED Course   Procedures  Labs Reviewed   COMPREHENSIVE METABOLIC PANEL - Abnormal; Notable for the following components:       Result Value    CO2 22 (*)     All other components within normal limits   CBC W/ AUTO DIFFERENTIAL - Abnormal; Notable for the following components:    Hemoglobin 11.6 (*)     Hematocrit 36.7 (*)     MCH 26.0 (*)     MCHC 31.6 (*)     RDW 16.5 (*)     All other components within normal limits   HIV 1 / 2 ANTIBODY    Narrative:     Release to patient->Immediate   HEPATITIS C ANTIBODY    Narrative:     Release to patient->Immediate   HEP C VIRUS HOLD SPECIMEN    Narrative:     Release to patient->Immediate          Imaging Results    None          Medications   methylPREDNISolone sodium succinate injection 125 mg (125 mg Intravenous Given 2/13/23 2150)   diphenhydrAMINE injection 25 mg (25 mg Intravenous Given 2/13/23 2152)   famotidine (PF) injection 20 mg (20 mg Intravenous Given 2/13/23 2153)   sodium chloride 0.9% bolus 1,000 mL 1,000 mL (0 mLs Intravenous Stopped 2/13/23 2306)       "              Vitals:    02/13/23 1932 02/13/23 2200 02/13/23 2351 02/14/23 0031   BP: (!) 174/95 138/89  139/74   Pulse: 89 90 82 83   Resp: 20 19 20 20   Temp: 98.7 °F (37.1 °C)      TempSrc: Oral      SpO2: 100% 100% 100% 97%   Weight: (!) 152.9 kg (337 lb 1.3 oz)      Height: 5' 9" (1.753 m)       02/14/23 0100 02/14/23 0101 02/14/23 0102   BP:   (!) 147/90   Pulse:   80   Resp:  18    Temp:      TempSrc:      SpO2: 99%     Weight:      Height:          Results for orders placed or performed during the hospital encounter of 02/13/23   HIV 1/2 Ag/Ab (4th Gen)   Result Value Ref Range    HIV 1/2 Ag/Ab Negative Negative   Hepatitis C Antibody   Result Value Ref Range    Hepatitis C Ab Negative Negative   HCV Virus Hold Specimen   Result Value Ref Range    HEP C Virus Hold Specimen Hold for HCV sendout    Comprehensive metabolic panel   Result Value Ref Range    Sodium 139 136 - 145 mmol/L    Potassium 3.9 3.5 - 5.1 mmol/L    Chloride 107 95 - 110 mmol/L    CO2 22 (L) 23 - 29 mmol/L    Glucose 82 70 - 110 mg/dL    BUN 14 6 - 20 mg/dL    Creatinine 0.7 0.5 - 1.4 mg/dL    Calcium 9.1 8.7 - 10.5 mg/dL    Total Protein 7.4 6.0 - 8.4 g/dL    Albumin 3.7 3.5 - 5.2 g/dL    Total Bilirubin 0.3 0.1 - 1.0 mg/dL    Alkaline Phosphatase 83 55 - 135 U/L    AST 17 10 - 40 U/L    ALT 16 10 - 44 U/L    Anion Gap 10 8 - 16 mmol/L    eGFR >60.0 >60 mL/min/1.73 m^2   CBC auto differential   Result Value Ref Range    WBC 7.15 3.90 - 12.70 K/uL    RBC 4.46 4.00 - 5.40 M/uL    Hemoglobin 11.6 (L) 12.0 - 16.0 g/dL    Hematocrit 36.7 (L) 37.0 - 48.5 %    MCV 82 82 - 98 fL    MCH 26.0 (L) 27.0 - 31.0 pg    MCHC 31.6 (L) 32.0 - 36.0 g/dL    RDW 16.5 (H) 11.5 - 14.5 %    Platelets 376 150 - 450 K/uL    MPV 9.9 9.2 - 12.9 fL    Immature Granulocytes 0.1 0.0 - 0.5 %    Gran # (ANC) 3.1 1.8 - 7.7 K/uL    Immature Grans (Abs) 0.01 0.00 - 0.04 K/uL    Lymph # 3.2 1.0 - 4.8 K/uL    Mono # 0.6 0.3 - 1.0 K/uL    Eos # 0.1 0.0 - 0.5 K/uL    Baso # " 0.03 0.00 - 0.20 K/uL    nRBC 0 0 /100 WBC    Gran % 43.9 38.0 - 73.0 %    Lymph % 44.9 18.0 - 48.0 %    Mono % 9.0 4.0 - 15.0 %    Eosinophil % 1.7 0.0 - 8.0 %    Basophil % 0.4 0.0 - 1.9 %    Differential Method Automated          Imaging Results    None         Medications   methylPREDNISolone sodium succinate injection 125 mg (125 mg Intravenous Given 2/13/23 2150)   diphenhydrAMINE injection 25 mg (25 mg Intravenous Given 2/13/23 2152)   famotidine (PF) injection 20 mg (20 mg Intravenous Given 2/13/23 2153)   sodium chloride 0.9% bolus 1,000 mL 1,000 mL (0 mLs Intravenous Stopped 2/13/23 2306)       12:47 AM - Re-evaluation: The patient is resting comfortably and is in no acute distress. She states that her symptoms have improved after treatment within ER. Discussed test results, shared treatment plan, specific conditions for return, and importance of follow up with patient and family.  Advised close f/u c pcp within one week and to return to ER if any issues arise.  She understands and agrees with the plan as discussed. Answered  her questions at this time. She has remained hemodynamically stable throughout the ED course and is appropriate for discharge home.     Patient is safe for discharge. There is no suggestion of airway or ENT emergency. Patient is hemodynamically stable and there is no suggestion of active anaphylaxis or progressive worsening of current symptoms.    Hives are generally idiopathic; possibly related to foods, sun, heat, cold or viruses. Use OTC benadryl, call if symptoms persist or worsen. Can try other antihistamines or possibly H2 blockers later prn.    Pre-hypertension/Hypertension: The pt has been informed that they may have pre-hypertension or hypertension based on a blood pressure reading in the ED. I recommend that the pt call the PCP listed on their discharge instructions or a physician of their choice this week to arrange f/u for further evaluation of possible pre-hypertension or  hypertension.     Lise Qureshi was given a handout which discussed their disease process, precautions, and instructions for follow-up and therapy.     Follow-up Information       [facility]. Schedule an appointment as soon as possible for a visit in 1 week.    Contact information:  [address] [facility] [address]  [phone]               [address]. Schedule an appointment as soon as possible for a visit in 1 week.    Specialty: Internal Medicine  Contact information:  [address] [facility] [address] [phone]  Additional information:  Please park on the [address] side and use the Clinic entrance. Check in on the 2nd floor, to the right.             [place] - Emergency Dept.    Specialty: Emergency Medicine  Why: As needed, If symptoms worsen  Contact information:  [address]  [facility] [address] [phone]                              Medication List        START taking these medications      diphenhydrAMINE 25 mg capsule  Commonly known as: BENADRYL  Take 1 capsule (25 mg total) by mouth every 6 (six) hours as needed for Itching or Allergies.     EPINEPHrine 0.3 mg/0.3 mL Atin  Commonly known as: EPIPEN  Inject 0.3 mLs (0.3 mg total) into the muscle as needed.     famotidine 20 MG tablet  Commonly known as: PEPCID  Take 1 tablet (20 mg total) by mouth 2 (two) times daily.     methylPREDNISolone 4 mg tablet  Commonly known as: MEDROL DOSEPACK  use as directed            ASK your doctor about these medications      ferrous sulfate 325 mg (65 mg iron) Tab tablet  Commonly known as: FEOSOL  Take 1 tablet (325 mg total) by mouth 2 (two) times daily.     hyoscyamine 0.125 mg Subl  Commonly known as: LEVSIN/SL  Place 1 tablet (0.125 mg total) under the tongue every 6 (six) hours as needed (reflux discomfort).     losartan 50 MG tablet  Commonly known as: COZAAR  Take 1 tablet (50 mg total) by mouth once daily.      pantoprazole 20 MG tablet  Commonly known as: PROTONIX  Take 1 tablet (20 mg total) by mouth once daily. For reflux for 10 days               Where to Get Your Medications        You can get these medications from any pharmacy    Bring a paper prescription for each of these medications  diphenhydrAMINE 25 mg capsule  EPINEPHrine 0.3 mg/0.3 mL Atin  famotidine 20 MG tablet  methylPREDNISolone 4 mg tablet        Current Discharge Medication List            ED Diagnosis  1. Allergic reaction, initial encounter    2. Rash    3. Hives                  Clinical Impression:   Final diagnoses:  [T78.40XA] Allergic reaction, initial encounter (Primary)  [R21] Rash  [L50.9] Hives        ED Disposition Condition    Discharge Stable          ED Prescriptions       Medication Sig Dispense Start Date End Date Auth. Provider    methylPREDNISolone (MEDROL DOSEPACK) 4 mg tablet use as directed 1 each 2/14/2023 -- Chon Grant Jr., MD    diphenhydrAMINE (BENADRYL) 25 mg capsule Take 1 capsule (25 mg total) by mouth every 6 (six) hours as needed for Itching or Allergies. 20 capsule 2/14/2023 -- Chon Grant Jr., MD    famotidine (PEPCID) 20 MG tablet Take 1 tablet (20 mg total) by mouth 2 (two) times daily. 20 tablet 2/14/2023 2/14/2024 Chon Grant Jr., MD    EPINEPHrine (EPIPEN) 0.3 mg/0.3 mL AtIn Inject 0.3 mLs (0.3 mg total) into the muscle as needed. 1 each 2/14/2023 2/14/2024 Chon Grant Jr., MD          Follow-up Information       Follow up With Specialties Details Why Contact Info Additional Information    [facility]  Schedule an appointment as soon as possible for a visit in 1 week  [address] [facility] [address] [phone]       [address] Internal Medicine Schedule an appointment as soon as possible for a visit in 1 week  [address] [facility] [address] [phone] Please park on the [address] side and use the Clinic entrance. Check in on  the 2nd floor, to the right.    [facility] Emergency Dept Emergency Medicine  As needed, If symptoms worsen [address] [facility] [address]  [facility] [id number] [phone]              Chon Grant Jr., MD  02/14/23 0049       Chon Grant Jr., MD  02/14/23 0124

## 2023-03-24 ENCOUNTER — HOSPITAL ENCOUNTER (EMERGENCY)
Facility: HOSPITAL | Age: 28
Discharge: HOME | End: 2023-03-24
Attending: EMERGENCY MEDICINE
Payer: MEDICAID

## 2023-03-24 VITALS
SYSTOLIC BLOOD PRESSURE: 172 MMHG | BODY MASS INDEX: 50.17 KG/M2 | RESPIRATION RATE: 16 BRPM | OXYGEN SATURATION: 99 % | WEIGHT: 293 LBS | HEART RATE: 108 BPM | DIASTOLIC BLOOD PRESSURE: 90 MMHG | TEMPERATURE: 99 F
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
## 2023-03-24 DIAGNOSIS — M54.2 NECK PAIN: ICD-10-CM

## 2023-03-24 DIAGNOSIS — M54.50 ACUTE LOW BACK PAIN, UNSPECIFIED BACK PAIN LATERALITY, UNSPECIFIED WHETHER SCIATICA PRESENT: ICD-10-CM

## 2023-03-24 PROCEDURE — 99284 EMERGENCY DEPT VISIT MOD MDM: CPT | Mod: ER

## 2023-03-24 RX ORDER — ACETAMINOPHEN 325 MG/1
650 TABLET ORAL EVERY 6 HOURS PRN
Qty: 40 TABLET | Refills: 0 | Status: SHIPPED | OUTPATIENT
Start: 2023-03-24 | End: 2023-03-29

## 2023-03-24 RX ORDER — CYCLOBENZAPRINE HCL 10 MG
10 TABLET ORAL 3 TIMES DAILY PRN
Qty: 15 TABLET | Refills: 0 | Status: SHIPPED | OUTPATIENT
Start: 2023-03-24 | End: 2023-03-29
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
## 2023-05-18 ENCOUNTER — HOSPITAL ENCOUNTER (EMERGENCY)
Facility: HOSPITAL | Age: 28
Discharge: HOME | End: 2023-05-18
Attending: EMERGENCY MEDICINE
Payer: MEDICAID

## 2023-05-18 VITALS
TEMPERATURE: 98 F | OXYGEN SATURATION: 98 % | DIASTOLIC BLOOD PRESSURE: 78 MMHG | SYSTOLIC BLOOD PRESSURE: 133 MMHG | WEIGHT: 293 LBS | HEART RATE: 86 BPM | RESPIRATION RATE: 19 BRPM | BODY MASS INDEX: 49.39 KG/M2

## 2023-05-18 DIAGNOSIS — R07.89 ATYPICAL CHEST PAIN: ICD-10-CM

## 2023-05-18 DIAGNOSIS — M48.02 SPINAL STENOSIS OF CERVICAL REGION WITH RADICULOPATHY: Primary | ICD-10-CM

## 2023-05-18 DIAGNOSIS — M54.12 SPINAL STENOSIS OF CERVICAL REGION WITH RADICULOPATHY: Primary | ICD-10-CM

## 2023-05-18 LAB
ANION GAP SERPL CALC-SCNC: 9 MMOL/L (ref 8–16)
B-HCG UR QL: NEGATIVE
BASOPHILS # BLD AUTO: 0.03 K/UL (ref 0–0.2)
BASOPHILS NFR BLD: 0.4 % (ref 0–1.9)
BUN SERPL-MCNC: 11 MG/DL (ref 6–20)
CALCIUM SERPL-MCNC: 10 MG/DL (ref 8.7–10.5)
CHLORIDE SERPL-SCNC: 105 MMOL/L (ref 95–110)
CO2 SERPL-SCNC: 25 MMOL/L (ref 23–29)
CREAT SERPL-MCNC: 0.7 MG/DL (ref 0.5–1.4)
DIFFERENTIAL METHOD: ABNORMAL
EOSINOPHIL # BLD AUTO: 0.1 K/UL (ref 0–0.5)
EOSINOPHIL NFR BLD: 1.2 % (ref 0–8)
ERYTHROCYTE [DISTWIDTH] IN BLOOD BY AUTOMATED COUNT: 15.5 % (ref 11.5–14.5)
EST. GFR  (NO RACE VARIABLE): >60 ML/MIN/1.73 M^2
GLUCOSE SERPL-MCNC: 82 MG/DL (ref 70–110)
HCT VFR BLD AUTO: 39.8 % (ref 37–48.5)
HGB BLD-MCNC: 12.2 G/DL (ref 12–16)
IMM GRANULOCYTES # BLD AUTO: 0.01 K/UL (ref 0–0.04)
IMM GRANULOCYTES NFR BLD AUTO: 0.1 % (ref 0–0.5)
LYMPHOCYTES # BLD AUTO: 3.4 K/UL (ref 1–4.8)
LYMPHOCYTES NFR BLD: 44.8 % (ref 18–48)
MCH RBC QN AUTO: 25.7 PG (ref 27–31)
MCHC RBC AUTO-ENTMCNC: 30.7 G/DL (ref 32–36)
MCV RBC AUTO: 84 FL (ref 82–98)
MONOCYTES # BLD AUTO: 0.7 K/UL (ref 0.3–1)
MONOCYTES NFR BLD: 8.5 % (ref 4–15)
NEUTROPHILS # BLD AUTO: 3.5 K/UL (ref 1.8–7.7)
NEUTROPHILS NFR BLD: 45 % (ref 38–73)
NRBC BLD-RTO: 0 /100 WBC
PLATELET # BLD AUTO: 441 K/UL (ref 150–450)
PMV BLD AUTO: 10.9 FL (ref 9.2–12.9)
POTASSIUM SERPL-SCNC: 3.7 MMOL/L (ref 3.5–5.1)
RBC # BLD AUTO: 4.75 M/UL (ref 4–5.4)
SODIUM SERPL-SCNC: 139 MMOL/L (ref 136–145)
TROPONIN I SERPL DL<=0.01 NG/ML-MCNC: <0.006 NG/ML (ref 0–0.03)
WBC # BLD AUTO: 7.68 K/UL (ref 3.9–12.7)

## 2023-05-18 PROCEDURE — 96375 TX/PRO/DX INJ NEW DRUG ADDON: CPT | Mod: ER

## 2023-05-18 PROCEDURE — 93005 ELECTROCARDIOGRAM TRACING: CPT | Mod: ER

## 2023-05-18 PROCEDURE — 85025 COMPLETE CBC W/AUTO DIFF WBC: CPT | Mod: ER | Performed by: EMERGENCY MEDICINE

## 2023-05-18 PROCEDURE — 99285 EMERGENCY DEPT VISIT HI MDM: CPT | Mod: 25,ER

## 2023-05-18 PROCEDURE — 81025 URINE PREGNANCY TEST: CPT | Mod: ER | Performed by: EMERGENCY MEDICINE

## 2023-05-18 PROCEDURE — 80048 BASIC METABOLIC PNL TOTAL CA: CPT | Mod: ER | Performed by: EMERGENCY MEDICINE

## 2023-05-18 PROCEDURE — 63600175 PHARM REV CODE 636 W HCPCS: Mod: ER | Performed by: EMERGENCY MEDICINE

## 2023-05-18 PROCEDURE — 93010 ELECTROCARDIOGRAM REPORT: CPT | Mod: ,,, | Performed by: STUDENT IN AN ORGANIZED HEALTH CARE EDUCATION/TRAINING PROGRAM

## 2023-05-18 PROCEDURE — 96374 THER/PROPH/DIAG INJ IV PUSH: CPT | Mod: ER

## 2023-05-18 PROCEDURE — 93010 EKG 12-LEAD: ICD-10-PCS | Mod: ,,, | Performed by: STUDENT IN AN ORGANIZED HEALTH CARE EDUCATION/TRAINING PROGRAM

## 2023-05-18 PROCEDURE — 84484 ASSAY OF TROPONIN QUANT: CPT | Mod: ER | Performed by: EMERGENCY MEDICINE

## 2023-05-18 RX ORDER — MORPHINE SULFATE 4 MG/ML
4 INJECTION, SOLUTION INTRAMUSCULAR; INTRAVENOUS
Status: COMPLETED | OUTPATIENT
Start: 2023-05-18 | End: 2023-05-18

## 2023-05-18 RX ORDER — ONDANSETRON 2 MG/ML
4 INJECTION INTRAMUSCULAR; INTRAVENOUS
Status: COMPLETED | OUTPATIENT
Start: 2023-05-18 | End: 2023-05-18

## 2023-05-18 RX ORDER — GABAPENTIN 300 MG/1
300 CAPSULE ORAL 3 TIMES DAILY
Qty: 42 CAPSULE | Refills: 0 | Status: SHIPPED | OUTPATIENT
Start: 2023-05-18 | End: 2023-08-28

## 2023-05-18 RX ADMIN — MORPHINE SULFATE 4 MG: 4 INJECTION INTRAVENOUS at 09:05

## 2023-05-18 RX ADMIN — ONDANSETRON 4 MG: 2 INJECTION INTRAMUSCULAR; INTRAVENOUS at 09:05
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
## 2023-08-28 ENCOUNTER — HOSPITAL ENCOUNTER (EMERGENCY)
Facility: HOSPITAL | Age: 28
Discharge: HOME | End: 2023-08-28
Attending: EMERGENCY MEDICINE
Payer: MEDICAID

## 2023-08-28 VITALS
OXYGEN SATURATION: 100 % | BODY MASS INDEX: 49.47 KG/M2 | RESPIRATION RATE: 18 BRPM | SYSTOLIC BLOOD PRESSURE: 130 MMHG | TEMPERATURE: 98 F | HEART RATE: 82 BPM | DIASTOLIC BLOOD PRESSURE: 62 MMHG | WEIGHT: 293 LBS

## 2023-08-28 DIAGNOSIS — R07.9 CHEST PAIN: Primary | ICD-10-CM

## 2023-08-28 LAB
ALBUMIN SERPL BCP-MCNC: 3.6 G/DL (ref 3.5–5.2)
ALP SERPL-CCNC: 92 U/L (ref 55–135)
ALT SERPL W/O P-5'-P-CCNC: 22 U/L (ref 10–44)
ANION GAP SERPL CALC-SCNC: 9 MMOL/L (ref 8–16)
AST SERPL-CCNC: 19 U/L (ref 10–40)
B-HCG UR QL: NEGATIVE
BACTERIA #/AREA URNS AUTO: ABNORMAL /HPF
BASOPHILS # BLD AUTO: 0.02 K/UL (ref 0–0.2)
BASOPHILS NFR BLD: 0.2 % (ref 0–1.9)
BILIRUB SERPL-MCNC: 0.2 MG/DL (ref 0.1–1)
BILIRUB UR QL STRIP: NEGATIVE
BNP SERPL-MCNC: 32 PG/ML (ref 0–99)
BUN SERPL-MCNC: 10 MG/DL (ref 6–20)
CALCIUM SERPL-MCNC: 9.1 MG/DL (ref 8.7–10.5)
CHLORIDE SERPL-SCNC: 107 MMOL/L (ref 95–110)
CLARITY UR REFRACT.AUTO: ABNORMAL
CO2 SERPL-SCNC: 25 MMOL/L (ref 23–29)
COLOR UR AUTO: ABNORMAL
CREAT SERPL-MCNC: 0.8 MG/DL (ref 0.5–1.4)
CTP QC/QA: YES
DIFFERENTIAL METHOD: ABNORMAL
EOSINOPHIL # BLD AUTO: 0.1 K/UL (ref 0–0.5)
EOSINOPHIL NFR BLD: 0.9 % (ref 0–8)
ERYTHROCYTE [DISTWIDTH] IN BLOOD BY AUTOMATED COUNT: 16.5 % (ref 11.5–14.5)
EST. GFR  (NO RACE VARIABLE): >60 ML/MIN/1.73 M^2
GLUCOSE SERPL-MCNC: 100 MG/DL (ref 70–110)
GLUCOSE UR QL STRIP: NEGATIVE
HCT VFR BLD AUTO: 37.7 % (ref 37–48.5)
HGB BLD-MCNC: 11.9 G/DL (ref 12–16)
HGB UR QL STRIP: ABNORMAL
HYALINE CASTS UR QL AUTO: 0 /LPF
IMM GRANULOCYTES # BLD AUTO: 0.03 K/UL (ref 0–0.04)
IMM GRANULOCYTES NFR BLD AUTO: 0.3 % (ref 0–0.5)
KETONES UR QL STRIP: NEGATIVE
LEUKOCYTE ESTERASE UR QL STRIP: ABNORMAL
LYMPHOCYTES # BLD AUTO: 2.6 K/UL (ref 1–4.8)
LYMPHOCYTES NFR BLD: 29.6 % (ref 18–48)
MCH RBC QN AUTO: 26.1 PG (ref 27–31)
MCHC RBC AUTO-ENTMCNC: 31.6 G/DL (ref 32–36)
MCV RBC AUTO: 83 FL (ref 82–98)
MICROSCOPIC COMMENT: ABNORMAL
MONOCYTES # BLD AUTO: 1 K/UL (ref 0.3–1)
MONOCYTES NFR BLD: 11.8 % (ref 4–15)
NEUTROPHILS # BLD AUTO: 5 K/UL (ref 1.8–7.7)
NEUTROPHILS NFR BLD: 57.2 % (ref 38–73)
NITRITE UR QL STRIP: NEGATIVE
NRBC BLD-RTO: 0 /100 WBC
PH UR STRIP: 8 [PH] (ref 5–8)
PLATELET # BLD AUTO: 375 K/UL (ref 150–450)
PMV BLD AUTO: 9.6 FL (ref 9.2–12.9)
POTASSIUM SERPL-SCNC: 3.7 MMOL/L (ref 3.5–5.1)
PROT SERPL-MCNC: 7.3 G/DL (ref 6–8.4)
PROT UR QL STRIP: ABNORMAL
RBC # BLD AUTO: 4.56 M/UL (ref 4–5.4)
RBC #/AREA URNS AUTO: >100 /HPF (ref 0–4)
SARS-COV-2 RDRP RESP QL NAA+PROBE: NEGATIVE
SODIUM SERPL-SCNC: 141 MMOL/L (ref 136–145)
SP GR UR STRIP: 1.01 (ref 1–1.03)
SQUAMOUS #/AREA URNS AUTO: 2 /HPF
TROPONIN I SERPL DL<=0.01 NG/ML-MCNC: 0.01 NG/ML (ref 0–0.03)
TROPONIN I SERPL DL<=0.01 NG/ML-MCNC: <0.006 NG/ML (ref 0–0.03)
URN SPEC COLLECT METH UR: ABNORMAL
UROBILINOGEN UR STRIP-ACNC: NEGATIVE EU/DL
WBC # BLD AUTO: 8.71 K/UL (ref 3.9–12.7)
WBC #/AREA URNS AUTO: 2 /HPF (ref 0–5)

## 2023-08-28 PROCEDURE — 84484 ASSAY OF TROPONIN QUANT: CPT | Mod: ER | Performed by: EMERGENCY MEDICINE

## 2023-08-28 PROCEDURE — 85025 COMPLETE CBC W/AUTO DIFF WBC: CPT | Mod: ER | Performed by: EMERGENCY MEDICINE

## 2023-08-28 PROCEDURE — 93010 ELECTROCARDIOGRAM REPORT: CPT | Mod: ,,, | Performed by: STUDENT IN AN ORGANIZED HEALTH CARE EDUCATION/TRAINING PROGRAM

## 2023-08-28 PROCEDURE — 87635 SARS-COV-2 COVID-19 AMP PRB: CPT | Mod: ER | Performed by: EMERGENCY MEDICINE

## 2023-08-28 PROCEDURE — 93010 EKG 12-LEAD: ICD-10-PCS | Mod: ,,, | Performed by: STUDENT IN AN ORGANIZED HEALTH CARE EDUCATION/TRAINING PROGRAM

## 2023-08-28 PROCEDURE — 83880 ASSAY OF NATRIURETIC PEPTIDE: CPT | Mod: ER | Performed by: EMERGENCY MEDICINE

## 2023-08-28 PROCEDURE — 25000003 PHARM REV CODE 250: Mod: ER | Performed by: EMERGENCY MEDICINE

## 2023-08-28 PROCEDURE — 81025 URINE PREGNANCY TEST: CPT | Mod: ER | Performed by: EMERGENCY MEDICINE

## 2023-08-28 PROCEDURE — 80053 COMPREHEN METABOLIC PANEL: CPT | Mod: ER | Performed by: EMERGENCY MEDICINE

## 2023-08-28 PROCEDURE — 99285 EMERGENCY DEPT VISIT HI MDM: CPT | Mod: 25,ER

## 2023-08-28 PROCEDURE — 93005 ELECTROCARDIOGRAM TRACING: CPT | Mod: ER

## 2023-08-28 PROCEDURE — 81000 URINALYSIS NONAUTO W/SCOPE: CPT | Mod: ER | Performed by: EMERGENCY MEDICINE

## 2023-08-28 RX ORDER — ACETAMINOPHEN 325 MG/1
650 TABLET ORAL
Status: COMPLETED | OUTPATIENT
Start: 2023-08-28 | End: 2023-08-28

## 2023-08-28 RX ADMIN — ACETAMINOPHEN 650 MG: 325 TABLET ORAL at 08:08

## 2023-08-28 NOTE — ED PROVIDER NOTES
Encounter Date: 2023       History     Chief Complaint   Patient presents with    Chest Pain     Started 45 mins pta      The history is provided by the patient.   Chest Pain  The current episode started just prior to arrival. Chest pain occurs rarely. The chest pain is improving. At its most intense, the chest pain is at 8/10. The chest pain is currently at 4/10. The quality of the pain is described as aching. Pertinent negatives for primary symptoms include no fever, no fatigue, no syncope, no shortness of breath, no cough, no wheezing, no palpitations, no abdominal pain, no nausea, no vomiting, no dizziness and no altered mental status.   Pertinent negatives for associated symptoms include no weakness.     Review of patient's allergies indicates:   Allergen Reactions    Penicillins Hives    Vancomycin analogues     Naproxen Itching and Rash     Past Medical History:   Diagnosis Date    Acanthosis nigricans 3/27/2014    Anemia     Anxiety     Miscarriage within last 12 months     Obesity      Past Surgical History:   Procedure Laterality Date     SECTION      x1    DILATION AND CURETTAGE OF UTERUS      x2    DILATION AND CURETTAGE OF UTERUS USING SUCTION N/A 3/2/2020    Procedure: DILATION AND CURETTAGE, UTERUS, USING SUCTION;  Surgeon: Sherlyn Jesus MD;  Location: [facility] OR;  Service: OB/GYN;  Laterality: N/A;    DILATION AND CURETTAGE OF UTERUS USING SUCTION N/A 2020    Procedure: DILATION AND CURETTAGE, UTERUS, USING SUCTION;  Surgeon: Violetta Pak MD;  Location: [facility] OR;  Service: OB/GYN;  Laterality: N/A;     History reviewed. No pertinent family history.  Social History     Tobacco Use    Smoking status: Never    Smokeless tobacco: Never   Substance Use Topics    Alcohol use: No    Drug use: No     Review of Systems   Constitutional:  Negative for fatigue and fever.   HENT:  Negative for sore throat.    Respiratory:  Negative for cough, shortness of breath and wheezing.     Cardiovascular:  Positive for chest pain. Negative for palpitations and syncope.   Gastrointestinal:  Negative for abdominal pain, nausea and vomiting.   Genitourinary:  Negative for dysuria.   Musculoskeletal:  Negative for back pain.   Skin:  Negative for rash.   Neurological:  Negative for dizziness and weakness.   Hematological:  Does not bruise/bleed easily.       Physical Exam     Initial Vitals [08/28/23 1822]   BP Pulse Resp Temp SpO2   (!) 149/89 93 18 98.2 °F (36.8 °C) 98 %      MAP       --         Physical Exam    Nursing note and vitals reviewed.  Constitutional: She appears well-developed and well-nourished. No distress.   HENT:   Head: Normocephalic and atraumatic.   Mouth/Throat: Oropharynx is clear and moist.   Eyes: Conjunctivae and EOM are normal. Pupils are equal, round, and reactive to light.   Neck: Neck supple.   Normal range of motion.  Cardiovascular:  Normal rate, regular rhythm and normal heart sounds.           Pulmonary/Chest: Breath sounds normal. No respiratory distress.   Abdominal: Abdomen is soft. Bowel sounds are normal. She exhibits no distension. There is no abdominal tenderness.   Musculoskeletal:         General: Normal range of motion.      Cervical back: Normal range of motion and neck supple.     Neurological: She is alert and oriented to person, place, and time. She has normal strength.   Skin: Skin is warm and dry.   Psychiatric: She has a normal mood and affect. Thought content normal.         ED Course   Procedures  Labs Reviewed   CBC W/ AUTO DIFFERENTIAL - Abnormal; Notable for the following components:       Result Value    Hemoglobin 11.9 (*)     MCH 26.1 (*)     MCHC 31.6 (*)     RDW 16.5 (*)     All other components within normal limits   URINALYSIS, REFLEX TO URINE CULTURE - Abnormal; Notable for the following components:    Color, UA Red (*)     Appearance, UA Cloudy (*)     Protein, UA 2+ (*)     Occult Blood UA 3+ (*)     Leukocytes, UA Trace (*)     All  other components within normal limits    Narrative:     Specimen Source->Urine   URINALYSIS MICROSCOPIC - Abnormal; Notable for the following components:    RBC, UA >100 (*)     All other components within normal limits    Narrative:     Specimen Source->Urine   COMPREHENSIVE METABOLIC PANEL   B-TYPE NATRIURETIC PEPTIDE   TROPONIN I   PREGNANCY TEST, URINE RAPID   TROPONIN I   SARS-COV-2 RDRP GENE     Results for orders placed or performed during the hospital encounter of 08/28/23   CBC Auto Differential   Result Value Ref Range    WBC 8.71 3.90 - 12.70 K/uL    RBC 4.56 4.00 - 5.40 M/uL    Hemoglobin 11.9 (L) 12.0 - 16.0 g/dL    Hematocrit 37.7 37.0 - 48.5 %    MCV 83 82 - 98 fL    MCH 26.1 (L) 27.0 - 31.0 pg    MCHC 31.6 (L) 32.0 - 36.0 g/dL    RDW 16.5 (H) 11.5 - 14.5 %    Platelets 375 150 - 450 K/uL    MPV 9.6 9.2 - 12.9 fL    Immature Granulocytes 0.3 0.0 - 0.5 %    Gran # (ANC) 5.0 1.8 - 7.7 K/uL    Immature Grans (Abs) 0.03 0.00 - 0.04 K/uL    Lymph # 2.6 1.0 - 4.8 K/uL    Mono # 1.0 0.3 - 1.0 K/uL    Eos # 0.1 0.0 - 0.5 K/uL    Baso # 0.02 0.00 - 0.20 K/uL    nRBC 0 0 /100 WBC    Gran % 57.2 38.0 - 73.0 %    Lymph % 29.6 18.0 - 48.0 %    Mono % 11.8 4.0 - 15.0 %    Eosinophil % 0.9 0.0 - 8.0 %    Basophil % 0.2 0.0 - 1.9 %    Differential Method Automated    Comprehensive Metabolic Panel   Result Value Ref Range    Sodium 141 136 - 145 mmol/L    Potassium 3.7 3.5 - 5.1 mmol/L    Chloride 107 95 - 110 mmol/L    CO2 25 23 - 29 mmol/L    Glucose 100 70 - 110 mg/dL    BUN 10 6 - 20 mg/dL    Creatinine 0.8 0.5 - 1.4 mg/dL    Calcium 9.1 8.7 - 10.5 mg/dL    Total Protein 7.3 6.0 - 8.4 g/dL    Albumin 3.6 3.5 - 5.2 g/dL    Total Bilirubin 0.2 0.1 - 1.0 mg/dL    Alkaline Phosphatase 92 55 - 135 U/L    AST 19 10 - 40 U/L    ALT 22 10 - 44 U/L    eGFR >60.0 >60 mL/min/1.73 m^2    Anion Gap 9 8 - 16 mmol/L   BNP   Result Value Ref Range    BNP 32 0 - 99 pg/mL   Troponin I   Result Value Ref Range    Troponin I 0.013  0.000 - 0.026 ng/mL   Pregnancy, urine rapid   Result Value Ref Range    Preg Test, Ur Negative    Urinalysis, Reflex to Urine Culture Urine, Clean Catch    Specimen: Urine   Result Value Ref Range    Specimen UA Urine, Clean Catch     Color, UA Red (A) Yellow, Straw, Katherine    Appearance, UA Cloudy (A) Clear    pH, UA 8.0 5.0 - 8.0    Specific Gravity, UA 1.015 1.005 - 1.030    Protein, UA 2+ (A) Negative    Glucose, UA Negative Negative    Ketones, UA Negative Negative    Bilirubin (UA) Negative Negative    Occult Blood UA 3+ (A) Negative    Nitrite, UA Negative Negative    Urobilinogen, UA Negative <2.0 EU/dL    Leukocytes, UA Trace (A) Negative   Urinalysis Microscopic   Result Value Ref Range    RBC, UA >100 (H) 0 - 4 /hpf    WBC, UA 2 0 - 5 /hpf    Bacteria Occasional None-Occ /hpf    Squam Epithel, UA 2 /hpf    Hyaline Casts, UA 0 0-1/lpf /lpf    Microscopic Comment SEE COMMENT    Troponin I   Result Value Ref Range    Troponin I <0.006 0.000 - 0.026 ng/mL   POCT COVID-19 Rapid Screening   Result Value Ref Range    POC Rapid COVID Negative Negative     Acceptable Yes        EKG Readings: (Independently Interpreted)   Initial Reading: No STEMI. Rhythm: Normal Sinus Rhythm. Heart Rate: 92. Ectopy: No Ectopy. ST Segments: Normal ST Segments. T Waves: Normal. Axis: Normal. Clinical Impression: Normal Sinus Rhythm       Imaging Results              X-Ray Chest 1 View (Final result)  Result time 08/28/23 20:33:44      Final result by Brian Martinez MD (08/28/23 20:33:44)                   Impression:      No acute abnormality.      Electronically signed by: Brian Martinez  Date:    08/28/2023  Time:    20:33               Narrative:    EXAMINATION:  XR CHEST 1 VIEW    CLINICAL HISTORY:  Chest pain, unspecified    TECHNIQUE:  Single frontal view of the chest was performed.    COMPARISON:  None    FINDINGS:  The lungs are clear, with normal appearance of pulmonary vasculature and no pleural effusion or  pneumothorax.    The cardiac silhouette is normal in size. The hilar and mediastinal contours are unremarkable.    Bones are intact.                                       Medications   acetaminophen tablet 650 mg (650 mg Oral Given 8/28/23 2011)     Medical Decision Making  Amount and/or Complexity of Data Reviewed  Labs: ordered.  Radiology: ordered.  ECG/medicine tests: ordered and independent interpretation performed. Decision-making details documented in ED Course.    Risk  OTC drugs.  Decision regarding hospitalization.  Risk Details: Delta troponin negative, discussed need for close follow up with cardiology. I have discussed with patient and/or family/caretaker chest pain precautions, specifically to return for worsening chest pain, shortness of breath, fever, or any concern.  I have low suspicion for cardiopulmonary, vascular, infectious, respiratory, or other emergent medical condition based on my evaluation in the ED.        Additional MDM:   Heart Score:    History:          Slightly suspicious.  ECG:             Normal  Age:               Less than 45 years  Risk factors: no risk factors known  Troponin:       Less than or equal to normal limit  Heart Score = 0                                Clinical Impression:   Final diagnoses:  [R07.9] Chest pain (Primary)        ED Disposition Condition    Discharge Stable          ED Prescriptions    None       Follow-up Information       Follow up With Specialties Details Why Contact Info    Cardiology  Call in 2 days      PCP  Call in 3 days               Tor Levy MD  08/28/23 2932

## 2023-09-23 ENCOUNTER — HOSPITAL ENCOUNTER (EMERGENCY)
Facility: HOSPITAL | Age: 28
Discharge: HOME | End: 2023-09-23
Attending: EMERGENCY MEDICINE
Payer: MEDICAID

## 2023-09-23 VITALS
SYSTOLIC BLOOD PRESSURE: 154 MMHG | DIASTOLIC BLOOD PRESSURE: 67 MMHG | WEIGHT: 293 LBS | RESPIRATION RATE: 20 BRPM | TEMPERATURE: 98 F | HEART RATE: 96 BPM | OXYGEN SATURATION: 100 % | BODY MASS INDEX: 49.55 KG/M2

## 2023-09-23 DIAGNOSIS — N93.9 VAGINAL BLEEDING: Primary | ICD-10-CM

## 2023-09-23 LAB
ALBUMIN SERPL BCP-MCNC: 3.5 G/DL (ref 3.5–5.2)
ALP SERPL-CCNC: 91 U/L (ref 55–135)
ALT SERPL W/O P-5'-P-CCNC: 16 U/L (ref 10–44)
ANION GAP SERPL CALC-SCNC: 8 MMOL/L (ref 8–16)
AST SERPL-CCNC: 21 U/L (ref 10–40)
B-HCG UR QL: NEGATIVE
BACTERIA #/AREA URNS HPF: ABNORMAL /HPF
BASOPHILS # BLD AUTO: 0.01 K/UL (ref 0–0.2)
BASOPHILS NFR BLD: 0.1 % (ref 0–1.9)
BILIRUB SERPL-MCNC: 0.2 MG/DL (ref 0.1–1)
BILIRUB UR QL STRIP: NEGATIVE
BUN SERPL-MCNC: 16 MG/DL (ref 6–20)
CALCIUM SERPL-MCNC: 9.2 MG/DL (ref 8.7–10.5)
CHLORIDE SERPL-SCNC: 110 MMOL/L (ref 95–110)
CLARITY UR: ABNORMAL
CO2 SERPL-SCNC: 24 MMOL/L (ref 23–29)
COLOR UR: ABNORMAL
CREAT SERPL-MCNC: 0.7 MG/DL (ref 0.5–1.4)
DIFFERENTIAL METHOD: ABNORMAL
EOSINOPHIL # BLD AUTO: 0.1 K/UL (ref 0–0.5)
EOSINOPHIL NFR BLD: 0.7 % (ref 0–8)
ERYTHROCYTE [DISTWIDTH] IN BLOOD BY AUTOMATED COUNT: 16.6 % (ref 11.5–14.5)
EST. GFR  (NO RACE VARIABLE): >60 ML/MIN/1.73 M^2
GLUCOSE SERPL-MCNC: 102 MG/DL (ref 70–110)
GLUCOSE UR QL STRIP: NEGATIVE
HCT VFR BLD AUTO: 31.9 % (ref 37–48.5)
HGB BLD-MCNC: 9.8 G/DL (ref 12–16)
HGB UR QL STRIP: ABNORMAL
HYALINE CASTS #/AREA URNS LPF: 0 /LPF
IMM GRANULOCYTES # BLD AUTO: 0.01 K/UL (ref 0–0.04)
IMM GRANULOCYTES NFR BLD AUTO: 0.1 % (ref 0–0.5)
KETONES UR QL STRIP: NEGATIVE
LEUKOCYTE ESTERASE UR QL STRIP: NEGATIVE
LYMPHOCYTES # BLD AUTO: 2.7 K/UL (ref 1–4.8)
LYMPHOCYTES NFR BLD: 33.8 % (ref 18–48)
MCH RBC QN AUTO: 25.8 PG (ref 27–31)
MCHC RBC AUTO-ENTMCNC: 30.7 G/DL (ref 32–36)
MCV RBC AUTO: 84 FL (ref 82–98)
MICROSCOPIC COMMENT: ABNORMAL
MONOCYTES # BLD AUTO: 1 K/UL (ref 0.3–1)
MONOCYTES NFR BLD: 11.8 % (ref 4–15)
NEUTROPHILS # BLD AUTO: 4.3 K/UL (ref 1.8–7.7)
NEUTROPHILS NFR BLD: 53.5 % (ref 38–73)
NITRITE UR QL STRIP: NEGATIVE
NRBC BLD-RTO: 0 /100 WBC
PH UR STRIP: 6 [PH] (ref 5–8)
PLATELET # BLD AUTO: 324 K/UL (ref 150–450)
PMV BLD AUTO: 9.7 FL (ref 9.2–12.9)
POTASSIUM SERPL-SCNC: 4.2 MMOL/L (ref 3.5–5.1)
PROT SERPL-MCNC: 7.2 G/DL (ref 6–8.4)
PROT UR QL STRIP: ABNORMAL
RBC # BLD AUTO: 3.8 M/UL (ref 4–5.4)
RBC #/AREA URNS HPF: >100 /HPF (ref 0–4)
SODIUM SERPL-SCNC: 142 MMOL/L (ref 136–145)
SP GR UR STRIP: >1.03 (ref 1–1.03)
SQUAMOUS #/AREA URNS HPF: 4 /HPF
URN SPEC COLLECT METH UR: ABNORMAL
UROBILINOGEN UR STRIP-ACNC: NEGATIVE EU/DL
WBC # BLD AUTO: 8.02 K/UL (ref 3.9–12.7)
WBC #/AREA URNS HPF: 8 /HPF (ref 0–5)

## 2023-09-23 PROCEDURE — 96360 HYDRATION IV INFUSION INIT: CPT

## 2023-09-23 PROCEDURE — 96361 HYDRATE IV INFUSION ADD-ON: CPT

## 2023-09-23 PROCEDURE — 25000003 PHARM REV CODE 250: Performed by: EMERGENCY MEDICINE

## 2023-09-23 PROCEDURE — 80053 COMPREHEN METABOLIC PANEL: CPT | Performed by: EMERGENCY MEDICINE

## 2023-09-23 PROCEDURE — 81025 URINE PREGNANCY TEST: CPT | Performed by: NURSE PRACTITIONER

## 2023-09-23 PROCEDURE — 99284 EMERGENCY DEPT VISIT MOD MDM: CPT | Mod: 25

## 2023-09-23 PROCEDURE — 81000 URINALYSIS NONAUTO W/SCOPE: CPT | Performed by: NURSE PRACTITIONER

## 2023-09-23 PROCEDURE — 85025 COMPLETE CBC W/AUTO DIFF WBC: CPT | Performed by: EMERGENCY MEDICINE

## 2023-09-23 RX ORDER — ACETAMINOPHEN 500 MG
1000 TABLET ORAL
Status: COMPLETED | OUTPATIENT
Start: 2023-09-23 | End: 2023-09-23

## 2023-09-23 RX ADMIN — ACETAMINOPHEN 1000 MG: 500 TABLET ORAL at 01:09

## 2023-09-23 RX ADMIN — SODIUM CHLORIDE 1000 ML: 9 INJECTION, SOLUTION INTRAVENOUS at 01:09

## 2023-09-23 NOTE — ED PROVIDER NOTES
SCRIBE #1 NOTE: I, James Berger, am scribing for, and in the presence of, Chon Grant Jr., MD. I have scribed the entire note.       History     Chief Complaint   Patient presents with    Vaginal Bleeding     Vaginal bleeding and left sided cramping starting 30 min PTA; last menstrual cycle . Increased urinary frequency today.     Review of patient's allergies indicates:   Allergen Reactions    Penicillins Hives    Vancomycin analogues     Naproxen Itching and Rash         History of Present Illness     HPI    2023, 2:03 AM  History obtained from the patient      History of Present Illness: Lise Qureshi is a 27 y.o. female patient with a PMHx of anemia, anxiety, obesity, and miscarriage within that last year who presents to the Emergency Department for evaluation of abnormal vaginal bleeding which onset gradually 30 min PTA. Pt says that she went to the bathroom when she noticed blood on her underwear. Afterwards, she started feeling abd cramps, especially on her L side. Symptoms are intermittent and moderate in severity. No mitigating or exacerbating factors reported. No additional associated sxs. Patient denies any N/V/D, CP, diaphoresis, HA, and all other sxs at this time. No prior Tx. Pt says that her menstrual cycles are very consistent and she is early (most recent: 23). Pt is sexually active and does not use any form of contraception; she wants to ensure that she is not having another miscarriage. No further complaints or concerns at this time.       Arrival mode: Personal vehicle     PCP: Vidhi Cardenas NP        Past Medical History:  Past Medical History:   Diagnosis Date    Acanthosis nigricans 3/27/2014    Anemia     Anxiety     Miscarriage within last 12 months     Obesity        Past Surgical History:  Past Surgical History:   Procedure Laterality Date     SECTION      x1    DILATION AND CURETTAGE OF UTERUS      x2    DILATION AND CURETTAGE OF UTERUS USING  SUCTION N/A 3/2/2020    Procedure: DILATION AND CURETTAGE, UTERUS, USING SUCTION;  Surgeon: Sherlyn Jesus MD;  Location: [facility] OR;  Service: OB/GYN;  Laterality: N/A;    DILATION AND CURETTAGE OF UTERUS USING SUCTION N/A 7/30/2020    Procedure: DILATION AND CURETTAGE, UTERUS, USING SUCTION;  Surgeon: Violetta Pak MD;  Location: [facility] OR;  Service: OB/GYN;  Laterality: N/A;         Family History:  No family history on file.    Social History:  Social History     Tobacco Use    Smoking status: Never    Smokeless tobacco: Never   Substance and Sexual Activity    Alcohol use: No    Drug use: No    Sexual activity: Yes     Partners: Male        Review of Systems     Review of Systems   Constitutional:  Negative for diaphoresis and fever.   HENT:  Negative for sore throat.    Respiratory:  Negative for shortness of breath.    Cardiovascular:  Negative for chest pain.   Gastrointestinal:  Negative for diarrhea, nausea and vomiting.   Genitourinary:  Positive for vaginal bleeding. Negative for dysuria.   Musculoskeletal:  Positive for myalgias (L side abd cramps). Negative for back pain.   Skin:  Negative for rash.   Neurological:  Negative for weakness and headaches.   Hematological:  Does not bruise/bleed easily.   All other systems reviewed and are negative.       Physical Exam     Initial Vitals [09/23/23 0033]   BP Pulse Resp Temp SpO2   (!) 170/91 101 20 98.2 °F (36.8 °C) 99 %      MAP       --          Physical Exam  Nursing Notes and Vital Signs Reviewed.  Constitutional: Patient is in no acute distress. Well-developed and well-nourished.  Head: Atraumatic. Normocephalic.  Eyes: PERRL. EOM intact. Conjunctivae are not pale. No scleral icterus.  ENT: Mucous membranes are moist. Oropharynx is clear and symmetric.    Neck: Supple. Full ROM. No lymphadenopathy.  Cardiovascular: Regular rate. Regular rhythm. No murmurs, rubs, or gallops. Distal pulses are 2+ and symmetric.  Pulmonary/Chest: No respiratory  distress. Clear to auscultation bilaterally. No wheezing or rales.  Abdominal: Soft and non-distended.  There is no tenderness.  No rebound, guarding, or rigidity. Good bowel sounds.  Genitourinary: No CVA tenderness  Musculoskeletal: Moves all extremities. No obvious deformities. No edema. No calf tenderness.  Skin: Warm and dry.  Neurological:  Alert, awake, and appropriate.  Normal speech.  No acute focal neurological deficits are appreciated.  Psychiatric: Normal affect. Good eye contact. Appropriate in content.     ED Course   Procedures  ED Vital Signs:  Vitals:    09/23/23 0033 09/23/23 0159 09/23/23 0238 09/23/23 0303   BP: (!) 170/91  (!) 142/62 (!) 153/88   Pulse: 101 96     Resp: 20      Temp: 98.2 °F (36.8 °C)      TempSrc: Oral      SpO2: 99% 100%     Weight: (!) 152.2 kg (335 lb 8.6 oz)       09/23/23 0332 09/23/23 0403   BP: (!) 149/76 (!) 154/67   Pulse:     Resp:     Temp:     TempSrc:     SpO2:     Weight:         Abnormal Lab Results:  Labs Reviewed   URINALYSIS, REFLEX TO URINE CULTURE - Abnormal; Notable for the following components:       Result Value    Color, UA Orange (*)     Appearance, UA Hazy (*)     Specific Gravity, UA >1.030 (*)     Protein, UA 1+ (*)     Occult Blood UA 3+ (*)     All other components within normal limits    Narrative:     Specimen Source->Urine   URINALYSIS MICROSCOPIC - Abnormal; Notable for the following components:    RBC, UA >100 (*)     WBC, UA 8 (*)     All other components within normal limits    Narrative:     Specimen Source->Urine   CBC W/ AUTO DIFFERENTIAL - Abnormal; Notable for the following components:    RBC 3.80 (*)     Hemoglobin 9.8 (*)     Hematocrit 31.9 (*)     MCH 25.8 (*)     MCHC 30.7 (*)     RDW 16.6 (*)     All other components within normal limits   PREGNANCY TEST, URINE RAPID    Narrative:     Specimen Source->Urine   COMPREHENSIVE METABOLIC PANEL        All Lab Results:  Results for orders placed or performed during the hospital encounter  of 09/23/23   Urinalysis, Reflex to Urine Culture Urine, Clean Catch    Specimen: Urine   Result Value Ref Range    Specimen UA Urine, Clean Catch     Color, UA Orange (A) Yellow, Straw, Katherine    Appearance, UA Hazy (A) Clear    pH, UA 6.0 5.0 - 8.0    Specific Gravity, UA >1.030 (A) 1.005 - 1.030    Protein, UA 1+ (A) Negative    Glucose, UA Negative Negative    Ketones, UA Negative Negative    Bilirubin (UA) Negative Negative    Occult Blood UA 3+ (A) Negative    Nitrite, UA Negative Negative    Urobilinogen, UA Negative <2.0 EU/dL    Leukocytes, UA Negative Negative   Pregnancy, urine rapid (UPT)   Result Value Ref Range    Preg Test, Ur Negative    Comprehensive metabolic panel   Result Value Ref Range    Sodium 142 136 - 145 mmol/L    Potassium 4.2 3.5 - 5.1 mmol/L    Chloride 110 95 - 110 mmol/L    CO2 24 23 - 29 mmol/L    Glucose 102 70 - 110 mg/dL    BUN 16 6 - 20 mg/dL    Creatinine 0.7 0.5 - 1.4 mg/dL    Calcium 9.2 8.7 - 10.5 mg/dL    Total Protein 7.2 6.0 - 8.4 g/dL    Albumin 3.5 3.5 - 5.2 g/dL    Total Bilirubin 0.2 0.1 - 1.0 mg/dL    Alkaline Phosphatase 91 55 - 135 U/L    AST 21 10 - 40 U/L    ALT 16 10 - 44 U/L    eGFR >60 >60 mL/min/1.73 m^2    Anion Gap 8 8 - 16 mmol/L   Urinalysis Microscopic   Result Value Ref Range    RBC, UA >100 (H) 0 - 4 /hpf    WBC, UA 8 (H) 0 - 5 /hpf    Bacteria None None-Occ /hpf    Squam Epithel, UA 4 /hpf    Hyaline Casts, UA 0 0-1/lpf /lpf    Microscopic Comment SEE COMMENT    CBC auto differential   Result Value Ref Range    WBC 8.02 3.90 - 12.70 K/uL    RBC 3.80 (L) 4.00 - 5.40 M/uL    Hemoglobin 9.8 (L) 12.0 - 16.0 g/dL    Hematocrit 31.9 (L) 37.0 - 48.5 %    MCV 84 82 - 98 fL    MCH 25.8 (L) 27.0 - 31.0 pg    MCHC 30.7 (L) 32.0 - 36.0 g/dL    RDW 16.6 (H) 11.5 - 14.5 %    Platelets 324 150 - 450 K/uL    MPV 9.7 9.2 - 12.9 fL    Immature Granulocytes 0.1 0.0 - 0.5 %    Gran # (ANC) 4.3 1.8 - 7.7 K/uL    Immature Grans (Abs) 0.01 0.00 - 0.04 K/uL    Lymph # 2.7 1.0  - 4.8 K/uL    Mono # 1.0 0.3 - 1.0 K/uL    Eos # 0.1 0.0 - 0.5 K/uL    Baso # 0.01 0.00 - 0.20 K/uL    nRBC 0 0 /100 WBC    Gran % 53.5 38.0 - 73.0 %    Lymph % 33.8 18.0 - 48.0 %    Mono % 11.8 4.0 - 15.0 %    Eosinophil % 0.7 0.0 - 8.0 %    Basophil % 0.1 0.0 - 1.9 %    Differential Method Automated         Imaging Results:  Imaging Results    None                   The Emergency Provider reviewed the vital signs and test results, which are outlined above.     ED Discussion       3:46 AM: Pt tells that her last menstrual period was actually 9/28/23. She did not realize that it was the 9/23/23. She was offered a US but declined, saying she will f/u with OB/GYN.    3:47 AM: Reassessed pt at this time. Discussed with pt all pertinent ED information and results. Discussed pt dx and plan of tx. Gave pt all f/u and return to the ED instructions. All questions and concerns were addressed at this time. Pt expresses understanding of information and instructions, and is comfortable with plan to discharge. Pt is stable for discharge.    I discussed with patient and/or family/caretaker that evaluation in the ED does not suggest any emergent or life threatening medical conditions requiring immediate intervention beyond what was provided in the ED, and I believe patient is safe for discharge.  Regardless, an unremarkable evaluation in the ED does not preclude the development or presence of a serious of life threatening condition. As such, patient was instructed to return immediately for any worsening or change in current symptoms.    Regarding VAGINAL BLEEDING, I explained to patient that a small amount of bleeding (spotting) from the vagina is relatively common and that the bleeding usually stops on its own. I discussed possible causes of bleeding or spotting such as: infection or inflammation of the cervix; growths (polyps) on the cervix; and/or the development of tiny cysts in the uterus.  For home care, I instructed patient  to: watch condition for any changes; follow health care provider's instructions; keep track of the number of pads used each day, how often pads are changed, and how soaked (saturated) they are; avoid using tampons and do not douche; do not have sexual intercourse or orgasms until approved by health care provider; if tissue is passed from vagina, save the tissue and show it to obstetrician/gynecologist; only take over-the-counter or prescription medicines as directed; avoid taking aspirin because it can increase bleeding; and keep all follow-up appointments as directed by health care provider.  Contact and follow up with obstetrician/gynecologist if bleeding increases, severe abdominal cramps develop,  large clots or tissue from expel from vagina, and for any further questions or concerns regarding vaginal bleeding and the treatment plan prescribed.          Medical Decision Making  Amount and/or Complexity of Data Reviewed  Labs: ordered. Decision-making details documented in ED Course.    Risk  OTC drugs.                ED Medication(s):  Medications   acetaminophen tablet 1,000 mg (1,000 mg Oral Given 9/23/23 0156)   sodium chloride 0.9% bolus 1,000 mL 1,000 mL (0 mLs Intravenous Stopped 9/23/23 0403)       Discharge Medication List as of 9/23/2023  3:46 AM           Follow-up Information       Vidhi Cardenas, NP. Schedule an appointment as soon as possible for a visit in 1 week.    Specialty: Family Medicine  Contact information:  [address] [facility] [address]  [phone]               [facility]. Schedule an appointment as soon as possible for a visit in 1 week.    Specialty: Obstetrics and Gynecology  Contact information:  [address] [facility] [address] [phone]  Additional information:  Please park on the [address] side and use the Clinic entrance. Check in on the 2nd floor, to the left.             [facility] - Emergency Dept..    Specialty:  Emergency Medicine  Why: As needed, If symptoms worsen  Contact information:  [address]  [facility] [address] [phone]                               Scribe Attestation:   Scribe #1: I performed the above scribed service and the documentation accurately describes the services I performed. I attest to the accuracy of the note.     Attending:   Physician Attestation Statement for Scribe #1: I, Chon Grant Jr., MD, personally performed the services described in this documentation, as scribed by James Berger, in my presence, and it is both accurate and complete.           Clinical Impression       ICD-10-CM ICD-9-CM   1. Vaginal bleeding  N93.9 623.8       Disposition:   Disposition: Discharged  Condition: Stable         Chon Grant Jr., MD  09/24/23 6669

## 2023-09-23 NOTE — DISCHARGE INSTRUCTIONS
Regarding VAGINAL BLEEDING, I explained to patient that a small amount of bleeding (spotting) from the vagina is relatively common and that the bleeding usually stops on its own. I discussed possible causes of bleeding or spotting such as: infection or inflammation of the cervix; growths (polyps) on the cervix; and/or the development of tiny cysts in the uterus.  For home care, I instructed patient to: watch condition for any changes; follow health care provider's instructions; keep track of the number of pads used each day, how often pads are changed, and how soaked (saturated) they are; avoid using tampons and do not douche; do not have sexual intercourse or orgasms until approved by health care provider; if tissue is passed from vagina, save the tissue and show it to obstetrician/gynecologist; only take over-the-counter or prescription medicines as directed; avoid taking aspirin because it can increase bleeding; and keep all follow-up appointments as directed by health care provider.  Contact and follow up with obstetrician/gynecologist if bleeding increases, severe abdominal cramps develop,  large clots or tissue from expel from vagina, and for any further questions or concerns regarding vaginal bleeding and the treatment plan prescribed.

## 2023-09-23 NOTE — FIRST PROVIDER EVALUATION
Medical screening examination initiated.  I have conducted a focused provider triage encounter, findings are as follows:    Brief history of present illness:  patient presents to ER for left lower abdominal cramping and vaginal bleeding, onset today.    There were no vitals filed for this visit.    Pertinent physical exam:  no acute distress    Brief workup plan:  UA/UPT    Preliminary workup initiated; this workup will be continued and followed by the physician or advanced practice provider that is assigned to the patient when roomed.

## 2023-10-25 ENCOUNTER — HOSPITAL ENCOUNTER (EMERGENCY)
Facility: HOSPITAL | Age: 28
Discharge: HOME | End: 2023-10-26
Attending: EMERGENCY MEDICINE
Payer: MEDICAID

## 2023-10-25 DIAGNOSIS — R07.9 CHEST PAIN: ICD-10-CM

## 2023-10-25 LAB
BASOPHILS # BLD AUTO: 0.02 K/UL (ref 0–0.2)
BASOPHILS NFR BLD: 0.3 % (ref 0–1.9)
DIFFERENTIAL METHOD: ABNORMAL
EOSINOPHIL # BLD AUTO: 0.1 K/UL (ref 0–0.5)
EOSINOPHIL NFR BLD: 1 % (ref 0–8)
ERYTHROCYTE [DISTWIDTH] IN BLOOD BY AUTOMATED COUNT: 16.3 % (ref 11.5–14.5)
HCT VFR BLD AUTO: 36 % (ref 37–48.5)
HGB BLD-MCNC: 11.2 G/DL (ref 12–16)
IMM GRANULOCYTES # BLD AUTO: 0.02 K/UL (ref 0–0.04)
IMM GRANULOCYTES NFR BLD AUTO: 0.3 % (ref 0–0.5)
LYMPHOCYTES # BLD AUTO: 2.6 K/UL (ref 1–4.8)
LYMPHOCYTES NFR BLD: 36.7 % (ref 18–48)
MCH RBC QN AUTO: 26.3 PG (ref 27–31)
MCHC RBC AUTO-ENTMCNC: 31.1 G/DL (ref 32–36)
MCV RBC AUTO: 85 FL (ref 82–98)
MONOCYTES # BLD AUTO: 0.6 K/UL (ref 0.3–1)
MONOCYTES NFR BLD: 8.8 % (ref 4–15)
NEUTROPHILS # BLD AUTO: 3.8 K/UL (ref 1.8–7.7)
NEUTROPHILS NFR BLD: 52.9 % (ref 38–73)
NRBC BLD-RTO: 0 /100 WBC
PLATELET # BLD AUTO: 386 K/UL (ref 150–450)
PMV BLD AUTO: 9.8 FL (ref 9.2–12.9)
RBC # BLD AUTO: 4.26 M/UL (ref 4–5.4)
WBC # BLD AUTO: 7.16 K/UL (ref 3.9–12.7)

## 2023-10-25 PROCEDURE — 80053 COMPREHEN METABOLIC PANEL: CPT | Mod: ER | Performed by: EMERGENCY MEDICINE

## 2023-10-25 PROCEDURE — 93005 ELECTROCARDIOGRAM TRACING: CPT | Mod: ER

## 2023-10-25 PROCEDURE — 93010 EKG 12-LEAD: ICD-10-PCS | Mod: ,,, | Performed by: INTERNAL MEDICINE

## 2023-10-25 PROCEDURE — 93010 ELECTROCARDIOGRAM REPORT: CPT | Mod: ,,, | Performed by: INTERNAL MEDICINE

## 2023-10-25 PROCEDURE — 84484 ASSAY OF TROPONIN QUANT: CPT | Mod: ER | Performed by: EMERGENCY MEDICINE

## 2023-10-25 PROCEDURE — 99285 EMERGENCY DEPT VISIT HI MDM: CPT | Mod: 25,ER

## 2023-10-25 PROCEDURE — 85379 FIBRIN DEGRADATION QUANT: CPT | Mod: ER | Performed by: EMERGENCY MEDICINE

## 2023-10-25 PROCEDURE — 85025 COMPLETE CBC W/AUTO DIFF WBC: CPT | Mod: ER | Performed by: EMERGENCY MEDICINE

## 2023-10-26 VITALS
BODY MASS INDEX: 43.4 KG/M2 | HEIGHT: 69 IN | TEMPERATURE: 99 F | OXYGEN SATURATION: 100 % | WEIGHT: 293 LBS | SYSTOLIC BLOOD PRESSURE: 133 MMHG | HEART RATE: 92 BPM | RESPIRATION RATE: 21 BRPM | DIASTOLIC BLOOD PRESSURE: 70 MMHG

## 2023-10-26 LAB
ALBUMIN SERPL BCP-MCNC: 3.6 G/DL (ref 3.5–5.2)
ALP SERPL-CCNC: 88 U/L (ref 55–135)
ALT SERPL W/O P-5'-P-CCNC: 17 U/L (ref 10–44)
ANION GAP SERPL CALC-SCNC: 10 MMOL/L (ref 8–16)
AST SERPL-CCNC: 17 U/L (ref 10–40)
B-HCG UR QL: NEGATIVE
BILIRUB SERPL-MCNC: 0.3 MG/DL (ref 0.1–1)
BUN SERPL-MCNC: 13 MG/DL (ref 6–20)
CALCIUM SERPL-MCNC: 9 MG/DL (ref 8.7–10.5)
CHLORIDE SERPL-SCNC: 107 MMOL/L (ref 95–110)
CO2 SERPL-SCNC: 24 MMOL/L (ref 23–29)
CREAT SERPL-MCNC: 0.8 MG/DL (ref 0.5–1.4)
D DIMER PPP IA.FEU-MCNC: 0.28 MG/L FEU
EST. GFR  (NO RACE VARIABLE): >60 ML/MIN/1.73 M^2
GLUCOSE SERPL-MCNC: 121 MG/DL (ref 70–110)
POTASSIUM SERPL-SCNC: 3.8 MMOL/L (ref 3.5–5.1)
PROT SERPL-MCNC: 7.4 G/DL (ref 6–8.4)
SODIUM SERPL-SCNC: 141 MMOL/L (ref 136–145)
TROPONIN I SERPL DL<=0.01 NG/ML-MCNC: <0.006 NG/ML (ref 0–0.03)

## 2023-10-26 PROCEDURE — 81025 URINE PREGNANCY TEST: CPT | Mod: ER | Performed by: EMERGENCY MEDICINE

## 2023-10-26 NOTE — ED PROVIDER NOTES
ED Provider Note - 10/25/2023    History     Chief Complaint   Patient presents with    Chest Pain     Started around 1500 today, pt. C/o continuous left sided chest pain with sob. Pt. Reports sharp pains.      Patient currently presents with chief complaint of chest pain.  This began 8-9 hours ago.  This is localized to the left parasternal region of the chest.  Patient reports associated SOB and denies associated diaphoresis, nausea, palpitations, and dizziness.  Patient's pain does not radiate.  Patient denies aspirin use in the last 24 hours. Denies DM, CAD, HLD, HTN, and tobacco abuse.  Patient denies leg swelling, history of VTE, immobilization, malignancy, prolonged travel, recent surgery, and recent trauma.  Patient does note history of anxiety but has been off of her meds for quite some time.      Review of patient's allergies indicates:   Allergen Reactions    Penicillins Hives    Vancomycin analogues     Naproxen Itching and Rash     Past Medical History:   Diagnosis Date    Acanthosis nigricans 3/27/2014    Anemia     Anxiety     Miscarriage within last 12 months     Obesity      Past Surgical History:   Procedure Laterality Date     SECTION      x1    DILATION AND CURETTAGE OF UTERUS      x2    DILATION AND CURETTAGE OF UTERUS USING SUCTION N/A 3/2/2020    Procedure: DILATION AND CURETTAGE, UTERUS, USING SUCTION;  Surgeon: Sherlyn Jesus MD;  Location: [facility] OR;  Service: OB/GYN;  Laterality: N/A;    DILATION AND CURETTAGE OF UTERUS USING SUCTION N/A 2020    Procedure: DILATION AND CURETTAGE, UTERUS, USING SUCTION;  Surgeon: Violetta Pak MD;  Location: [facility] OR;  Service: OB/GYN;  Laterality: N/A;     History reviewed. No pertinent family history.  Social History     Tobacco Use    Smoking status: Never    Smokeless tobacco: Never   Substance Use Topics    Alcohol use: No    Drug use: No     Review of Systems   Constitutional:  Negative for chills and fever.   HENT:  Negative for  "congestion and rhinorrhea.    Respiratory:  Positive for shortness of breath. Negative for cough.    Cardiovascular:  Positive for chest pain. Negative for palpitations.   Gastrointestinal:  Negative for abdominal pain, diarrhea and vomiting.   Genitourinary:  Negative for difficulty urinating and dysuria.   Skin:  Negative for color change and rash.   Neurological:  Negative for dizziness and light-headedness.   Hematological:  Negative for adenopathy. Does not bruise/bleed easily.       Physical Exam     Initial Vitals   BP Pulse Resp Temp SpO2   10/25/23 2320 10/25/23 2320 10/25/23 2320 10/25/23 2325 10/25/23 2320   138/89 103 20 98.5 °F (36.9 °C) 100 %      MAP       --                Vitals:    10/25/23 2317 10/25/23 2320 10/25/23 2325   BP:  138/89    Pulse:  103    Resp:  20    Temp:   98.5 °F (36.9 °C)   TempSrc:   Oral   SpO2:  100%    Weight: (!) 152 kg (335 lb 1.6 oz)     Height: 5' 9" (1.753 m)       Physical Exam    Nursing note and vitals reviewed.  Constitutional: She appears well-developed and well-nourished. She is not diaphoretic. No distress.   HENT:   Head: Normocephalic and atraumatic.   Nose: Nose normal.   Mouth/Throat: Oropharynx is clear and moist.   Eyes: Conjunctivae are normal. No scleral icterus.   Cardiovascular:  Normal rate, regular rhythm, normal heart sounds and intact distal pulses.           Pulmonary/Chest: Breath sounds normal. No respiratory distress.   Abdominal: Abdomen is soft. She exhibits no distension. There is no abdominal tenderness.   Musculoskeletal:         General: No edema. Normal range of motion.     Neurological: She is alert and oriented to person, place, and time. She has normal strength.   Skin: Skin is warm and dry.       ED Course   Procedures                   MDM  Differential Diagnoses   Based on available history, the working differential diagnoses considered during this evaluation include but are not limited to acute coronary syndrome, pulmonary " embolus, esophageal perforation, aortic dissection, pneumonia, and pneumothorax as well as musculoskeletal sources including chest wall strain and costochondritis.      LABS     Labs Reviewed   CBC W/ AUTO DIFFERENTIAL - Abnormal; Notable for the following components:       Result Value    Hemoglobin 11.2 (*)     Hematocrit 36.0 (*)     MCH 26.3 (*)     MCHC 31.1 (*)     RDW 16.3 (*)     All other components within normal limits   COMPREHENSIVE METABOLIC PANEL - Abnormal; Notable for the following components:    Glucose 121 (*)     All other components within normal limits   PREGNANCY TEST, URINE RAPID    Narrative:     Specimen Source->Urine   TROPONIN I   D DIMER, QUANTITATIVE     All available results from the labs ordered were independently reviewed. with findings as follows:  CBC unremarkable, CMP unremarkable, urine pregnancy test negative, troponin negative, D-dimer negative.     Imaging     Imaging Results              X-Ray Chest AP Portable (In process)                        EKG   EKG Readings: (Independently Interpreted)   Initial Reading: No STEMI. Rhythm: Normal Sinus Rhythm. Heart Rate: 98. Ectopy: Frequent PVCs. Conduction: Normal. Axis: Normal.       ED Management/Discussion   Medications - No data to display                The patient's list of active medical problems, social history, medications, and allergies as documented per RN staff has been reviewed.     During the course of the patient's evaluation, CTA CHEST was considered but deferred secondary to CTA Chest Deferral: low risk per clinical assessment and Well's PE Score along with negative D-Dimer.        Given the extended period of sustained symptoms prior to ED presentation, a satisfactory EKG, unremarkable cardiac monitoring, and single troponin level have been used to rule out ACS.  Additionally, I have no considerable suspicion for aortic dissection/aneurysm, esophageal perforation, pneumothorax or acute pulmonary complications based  on the presentation, exam, lab results, and imaging.    On final assessment, the patient appears well and comfortable for discharge.  I see no indication of an emergent process beyond that addressed during our encounter but have duly counseled the patient/family regarding the need for prompt follow-up as well as the indications that should prompt immediate return to the emergency room should new or worrisome developments occur.  The patient/family has been provided with verbal and printed direction regarding our final diagnosis(es) as well as instructions regarding use of OTC and/or Rx medications intended to manage the patient's aforementioned conditions including:  ED Prescriptions    None           Patient has been advised of the following recommended follow-up instructions:  Follow-up Information    None       The patient/family communicates understanding of all this information and all remaining questions and concerns were addressed at this time.      Referrals:  No orders of the defined types were placed in this encounter.      CLINICAL IMPRESSION    ICD-10-CM ICD-9-CM   1. Chest pain  R07.9 786.50                Ihsan Pritchard MD  10/26/23 0050

## 2024-03-03 ENCOUNTER — HOSPITAL ENCOUNTER (EMERGENCY)
Facility: HOSPITAL | Age: 29
Discharge: HOME | End: 2024-03-03
Attending: EMERGENCY MEDICINE
Payer: MEDICAID

## 2024-03-03 VITALS
WEIGHT: 293 LBS | HEART RATE: 108 BPM | OXYGEN SATURATION: 99 % | TEMPERATURE: 99 F | DIASTOLIC BLOOD PRESSURE: 87 MMHG | BODY MASS INDEX: 43.4 KG/M2 | HEIGHT: 69 IN | RESPIRATION RATE: 20 BRPM | SYSTOLIC BLOOD PRESSURE: 182 MMHG

## 2024-03-03 DIAGNOSIS — R35.0 URINARY FREQUENCY: Primary | ICD-10-CM

## 2024-03-03 DIAGNOSIS — R73.03 PREDIABETES: ICD-10-CM

## 2024-03-03 DIAGNOSIS — J06.9 VIRAL UPPER RESPIRATORY INFECTION: ICD-10-CM

## 2024-03-03 LAB
ALBUMIN SERPL BCP-MCNC: 3.7 G/DL (ref 3.5–5.2)
ALP SERPL-CCNC: 91 U/L (ref 55–135)
ALT SERPL W/O P-5'-P-CCNC: 19 U/L (ref 10–44)
ANION GAP SERPL CALC-SCNC: 11 MMOL/L (ref 8–16)
AST SERPL-CCNC: 17 U/L (ref 10–40)
B-HCG UR QL: NEGATIVE
BASOPHILS # BLD AUTO: 0.02 K/UL (ref 0–0.2)
BASOPHILS NFR BLD: 0.3 % (ref 0–1.9)
BILIRUB SERPL-MCNC: 0.4 MG/DL (ref 0.1–1)
BILIRUB UR QL STRIP: NEGATIVE
BUN SERPL-MCNC: 12 MG/DL (ref 6–20)
CALCIUM SERPL-MCNC: 9.1 MG/DL (ref 8.7–10.5)
CHLORIDE SERPL-SCNC: 106 MMOL/L (ref 95–110)
CLARITY UR REFRACT.AUTO: CLEAR
CO2 SERPL-SCNC: 24 MMOL/L (ref 23–29)
COLOR UR AUTO: YELLOW
CREAT SERPL-MCNC: 0.7 MG/DL (ref 0.5–1.4)
CTP QC/QA: YES
CTP QC/QA: YES
DIFFERENTIAL METHOD BLD: ABNORMAL
EOSINOPHIL # BLD AUTO: 0.1 K/UL (ref 0–0.5)
EOSINOPHIL NFR BLD: 1 % (ref 0–8)
ERYTHROCYTE [DISTWIDTH] IN BLOOD BY AUTOMATED COUNT: 16 % (ref 11.5–14.5)
EST. GFR  (NO RACE VARIABLE): >60 ML/MIN/1.73 M^2
GLUCOSE SERPL-MCNC: 110 MG/DL (ref 70–110)
GLUCOSE UR QL STRIP: NEGATIVE
HCT VFR BLD AUTO: 37.8 % (ref 37–48.5)
HEP C VIRUS HOLD SPECIMEN: NORMAL
HGB BLD-MCNC: 11.6 G/DL (ref 12–16)
HGB UR QL STRIP: NEGATIVE
IMM GRANULOCYTES # BLD AUTO: 0.02 K/UL (ref 0–0.04)
IMM GRANULOCYTES NFR BLD AUTO: 0.3 % (ref 0–0.5)
KETONES UR QL STRIP: NEGATIVE
LEUKOCYTE ESTERASE UR QL STRIP: NEGATIVE
LYMPHOCYTES # BLD AUTO: 1.5 K/UL (ref 1–4.8)
LYMPHOCYTES NFR BLD: 22.4 % (ref 18–48)
MCH RBC QN AUTO: 25.8 PG (ref 27–31)
MCHC RBC AUTO-ENTMCNC: 30.7 G/DL (ref 32–36)
MCV RBC AUTO: 84 FL (ref 82–98)
MONOCYTES # BLD AUTO: 0.6 K/UL (ref 0.3–1)
MONOCYTES NFR BLD: 8.3 % (ref 4–15)
NEUTROPHILS # BLD AUTO: 4.7 K/UL (ref 1.8–7.7)
NEUTROPHILS NFR BLD: 67.7 % (ref 38–73)
NITRITE UR QL STRIP: NEGATIVE
NRBC BLD-RTO: 0 /100 WBC
PH UR STRIP: 6 [PH] (ref 5–8)
PLATELET # BLD AUTO: 364 K/UL (ref 150–450)
PMV BLD AUTO: 10.1 FL (ref 9.2–12.9)
POC MOLECULAR INFLUENZA A AGN: NEGATIVE
POC MOLECULAR INFLUENZA B AGN: NEGATIVE
POCT GLUCOSE: 122 MG/DL (ref 70–110)
POTASSIUM SERPL-SCNC: 3.9 MMOL/L (ref 3.5–5.1)
PROT SERPL-MCNC: 7.1 G/DL (ref 6–8.4)
PROT UR QL STRIP: NEGATIVE
RBC # BLD AUTO: 4.5 M/UL (ref 4–5.4)
SARS-COV-2 RDRP RESP QL NAA+PROBE: NEGATIVE
SODIUM SERPL-SCNC: 141 MMOL/L (ref 136–145)
SP GR UR STRIP: 1.02 (ref 1–1.03)
URN SPEC COLLECT METH UR: NORMAL
UROBILINOGEN UR STRIP-ACNC: <2 EU/DL
WBC # BLD AUTO: 6.87 K/UL (ref 3.9–12.7)

## 2024-03-03 PROCEDURE — 86803 HEPATITIS C AB TEST: CPT | Performed by: EMERGENCY MEDICINE

## 2024-03-03 PROCEDURE — 99283 EMERGENCY DEPT VISIT LOW MDM: CPT | Mod: ER

## 2024-03-03 PROCEDURE — 82962 GLUCOSE BLOOD TEST: CPT | Mod: ER

## 2024-03-03 PROCEDURE — 81003 URINALYSIS AUTO W/O SCOPE: CPT | Mod: ER | Performed by: EMERGENCY MEDICINE

## 2024-03-03 PROCEDURE — 87502 INFLUENZA DNA AMP PROBE: CPT | Mod: ER

## 2024-03-03 PROCEDURE — 87635 SARS-COV-2 COVID-19 AMP PRB: CPT | Mod: ER | Performed by: EMERGENCY MEDICINE

## 2024-03-03 PROCEDURE — 87389 HIV-1 AG W/HIV-1&-2 AB AG IA: CPT | Performed by: EMERGENCY MEDICINE

## 2024-03-03 PROCEDURE — 85025 COMPLETE CBC W/AUTO DIFF WBC: CPT | Mod: ER | Performed by: EMERGENCY MEDICINE

## 2024-03-03 PROCEDURE — 80053 COMPREHEN METABOLIC PANEL: CPT | Mod: ER | Performed by: EMERGENCY MEDICINE

## 2024-03-03 PROCEDURE — 81025 URINE PREGNANCY TEST: CPT | Mod: ER | Performed by: EMERGENCY MEDICINE

## 2024-03-03 NOTE — DISCHARGE INSTRUCTIONS
Watch your blood sugar and your blood pressure both, talk to primary care about what your follow-up schedule should be and the non-medication approaches for controlling blood pressure and blood sugar.      No emergency condition or sign of serious infection today.

## 2024-03-03 NOTE — ED PROVIDER NOTES
Encounter Date: 3/3/2024       History     Chief Complaint   Patient presents with    Urinary Frequency     Frequent urination, nauseam, odor to urine, cold and flu s/s, hx of prediabetic     Recent diagnosis of prediabetes, mildly elevated hemoglobin A1c, mild upper respiratory infection symptoms and recent and ongoing urinary frequency, urgency, polyuria, polydipsia, slightly blurred vision.  No actual dysuria.  No recent antibiotics.  Mild nausea but no vomiting, fever, chills, sweats, back pain, or other complaints.  She feels there is a chance that she might be pregnant.  No other complaints.    The history is provided by the patient. No  was used.     Review of patient's allergies indicates:   Allergen Reactions    Penicillins Hives    Vancomycin analogues     Naproxen Itching and Rash     Past Medical History:   Diagnosis Date    Acanthosis nigricans 3/27/2014    Anemia     Anxiety     Miscarriage within last 12 months     Obesity      Past Surgical History:   Procedure Laterality Date     SECTION      x1    DILATION AND CURETTAGE OF UTERUS      x2    DILATION AND CURETTAGE OF UTERUS USING SUCTION N/A 3/2/2020    Procedure: DILATION AND CURETTAGE, UTERUS, USING SUCTION;  Surgeon: Sherlyn Jesus MD;  Location: [facility] OR;  Service: OB/GYN;  Laterality: N/A;    DILATION AND CURETTAGE OF UTERUS USING SUCTION N/A 2020    Procedure: DILATION AND CURETTAGE, UTERUS, USING SUCTION;  Surgeon: Violetta Pak MD;  Location: [facility] OR;  Service: OB/GYN;  Laterality: N/A;     History reviewed. No pertinent family history.  Social History     Tobacco Use    Smoking status: Never    Smokeless tobacco: Never   Substance Use Topics    Alcohol use: No    Drug use: No     Review of Systems   Constitutional:  Negative for activity change, fatigue and fever.   HENT:  Positive for congestion. Negative for ear pain, facial swelling, nosebleeds, sinus pressure and sore throat.    Eyes:  Negative  for pain, discharge, redness and visual disturbance.   Respiratory:  Positive for cough. Negative for choking, chest tightness, shortness of breath and wheezing.    Cardiovascular:  Negative for chest pain, palpitations and leg swelling.   Gastrointestinal:  Negative for abdominal distention, abdominal pain, nausea and vomiting.   Endocrine: Positive for polydipsia and polyuria. Negative for heat intolerance.   Genitourinary:  Positive for frequency and urgency. Negative for difficulty urinating, dysuria, flank pain and hematuria.   Musculoskeletal:  Negative for back pain, gait problem, joint swelling and myalgias.   Skin:  Negative for color change and rash.   Allergic/Immunologic: Negative for environmental allergies and food allergies.   Neurological:  Negative for dizziness, weakness, numbness and headaches.   Hematological:  Negative for adenopathy. Does not bruise/bleed easily.   Psychiatric/Behavioral:  Negative for agitation and behavioral problems. The patient is not nervous/anxious.    All other systems reviewed and are negative.      Physical Exam     Initial Vitals [03/03/24 1345]   BP Pulse Resp Temp SpO2   (!) 182/87 108 20 98.9 °F (37.2 °C) 99 %      MAP       --         Physical Exam    Nursing note and vitals reviewed.  Constitutional: She appears well-developed and well-nourished. She is not diaphoretic. No distress.   Obese   HENT:   Head: Normocephalic and atraumatic.   Mouth/Throat: No oropharyngeal exudate.   Eyes: Conjunctivae and EOM are normal. Pupils are equal, round, and reactive to light. Right eye exhibits no discharge. Left eye exhibits no discharge. No scleral icterus.   Neck: Neck supple. No thyromegaly present. No tracheal deviation present. No JVD present.   Normal range of motion.  Cardiovascular:  Normal rate, regular rhythm, normal heart sounds and intact distal pulses.     Exam reveals no gallop and no friction rub.       No murmur heard.  Pulmonary/Chest: Breath sounds normal.  No stridor. No respiratory distress. She has no wheezes. She has no rhonchi. She has no rales. She exhibits no tenderness.   Abdominal: Abdomen is soft. Bowel sounds are normal. She exhibits no distension and no mass. There is no abdominal tenderness. There is no rebound and no guarding.   Musculoskeletal:         General: No tenderness or edema. Normal range of motion.      Cervical back: Normal range of motion and neck supple.     Neurological: She is alert and oriented to person, place, and time. She has normal strength.   Skin: Skin is warm and dry. No rash and no abscess noted. No erythema.   Psychiatric: She has a normal mood and affect. Her behavior is normal. Judgment and thought content normal.         ED Course   Procedures  Labs Reviewed   CBC W/ AUTO DIFFERENTIAL - Abnormal; Notable for the following components:       Result Value    Hemoglobin 11.6 (*)     MCH 25.8 (*)     MCHC 30.7 (*)     RDW 16.0 (*)     All other components within normal limits    Narrative:     Release to patient->Immediate   POCT GLUCOSE - Abnormal; Notable for the following components:    POCT Glucose 122 (*)     All other components within normal limits   URINALYSIS, REFLEX TO URINE CULTURE    Narrative:     Specimen Source->Urine   PREGNANCY TEST, URINE RAPID    Narrative:     Specimen Source->Urine   COMPREHENSIVE METABOLIC PANEL    Narrative:     Release to patient->Immediate   HIV 1 / 2 ANTIBODY   HEPATITIS C ANTIBODY   HEP C VIRUS HOLD SPECIMEN   SARS-COV-2 RDRP GENE   POCT INFLUENZA A/B MOLECULAR   POCT GLUCOSE MONITORING CONTINUOUS          Imaging Results    None       2:50 PM Stable, counseled in detail, no sign of emergency condition.  She is encouraged to work with her primary care provider on close monitoring and follow-up of prediabetes and elevated blood pressure.       Medications - No data to display  Medical Decision Making  Problems Addressed:  Prediabetes: chronic illness or injury  Urinary frequency: acute  illness or injury  Viral upper respiratory infection: acute illness or injury    Amount and/or Complexity of Data Reviewed  Labs: ordered. Decision-making details documented in ED Course.      Additional MDM:   Differential Diagnosis:   Hyperglycemia, urinary tract infection, COVID, flu, pregnancy, among others                                    Clinical Impression:  Final diagnoses:  [R35.0] Urinary frequency (Primary)  [R73.03] Prediabetes  [J06.9] Viral upper respiratory infection          ED Disposition Condition    Discharge Stable          ED Prescriptions    None       Follow-up Information       Follow up With Specialties Details Why Contact Info    [facility] - Emergency Dept Emergency Medicine  As needed [id number]  [facility] [id number] [phone]    Vidhi Cardenas NP Family Medicine  As needed [address] [facility] [id number] [phone]               Eyal Hinkle MD  03/03/24 6146

## 2024-03-04 LAB
HCV AB SERPL QL IA: NEGATIVE
HIV 1+2 AB+HIV1 P24 AG SERPL QL IA: NEGATIVE

## 2024-07-09 ENCOUNTER — PATIENT MESSAGE (OUTPATIENT)
Dept: RESEARCH | Facility: HOSPITAL | Age: 29
End: 2024-07-09
Payer: MEDICAID

## 2024-08-10 ENCOUNTER — HOSPITAL ENCOUNTER (EMERGENCY)
Facility: HOSPITAL | Age: 29
Discharge: LEFT BEFORE DISCHARGE | End: 2024-08-10
Attending: EMERGENCY MEDICINE
Payer: MEDICAID

## 2024-08-10 VITALS
DIASTOLIC BLOOD PRESSURE: 96 MMHG | OXYGEN SATURATION: 100 % | HEART RATE: 97 BPM | HEIGHT: 69 IN | BODY MASS INDEX: 43.4 KG/M2 | TEMPERATURE: 98 F | WEIGHT: 293 LBS | SYSTOLIC BLOOD PRESSURE: 169 MMHG | RESPIRATION RATE: 20 BRPM

## 2024-08-10 PROCEDURE — 99900041 HC LEFT WITHOUT BEING SEEN- EMERGENCY: Mod: ER

## 2025-01-11 ENCOUNTER — HOSPITAL ENCOUNTER (EMERGENCY)
Facility: HOSPITAL | Age: 30
Discharge: HOME | End: 2025-01-11
Attending: EMERGENCY MEDICINE
Payer: MEDICAID

## 2025-01-11 ENCOUNTER — HOSPITAL ENCOUNTER (EMERGENCY)
Facility: HOSPITAL | Age: 30
Discharge: HOME | End: 2025-01-11
Payer: MEDICAID

## 2025-01-11 VITALS
OXYGEN SATURATION: 100 % | HEART RATE: 93 BPM | DIASTOLIC BLOOD PRESSURE: 70 MMHG | SYSTOLIC BLOOD PRESSURE: 137 MMHG | TEMPERATURE: 98 F | RESPIRATION RATE: 17 BRPM

## 2025-01-11 DIAGNOSIS — R10.9 FLANK PAIN: Primary | ICD-10-CM

## 2025-01-11 DIAGNOSIS — N39.0 URINARY TRACT INFECTION WITH HEMATURIA, SITE UNSPECIFIED: ICD-10-CM

## 2025-01-11 DIAGNOSIS — R31.9 URINARY TRACT INFECTION WITH HEMATURIA, SITE UNSPECIFIED: ICD-10-CM

## 2025-01-11 LAB
ANION GAP SERPL CALC-SCNC: 12 MMOL/L (ref 8–16)
B-HCG UR QL: NEGATIVE
BACTERIA #/AREA URNS AUTO: ABNORMAL /HPF
BASOPHILS # BLD AUTO: 0.02 K/UL (ref 0–0.2)
BASOPHILS NFR BLD: 0.3 % (ref 0–1.9)
BILIRUB UR QL STRIP: NEGATIVE
BUN SERPL-MCNC: 9 MG/DL (ref 6–20)
CALCIUM SERPL-MCNC: 8.9 MG/DL (ref 8.7–10.5)
CHLORIDE SERPL-SCNC: 106 MMOL/L (ref 95–110)
CLARITY UR REFRACT.AUTO: ABNORMAL
CO2 SERPL-SCNC: 22 MMOL/L (ref 23–29)
COLOR UR AUTO: YELLOW
CREAT SERPL-MCNC: 0.7 MG/DL (ref 0.5–1.4)
DIFFERENTIAL METHOD BLD: ABNORMAL
EOSINOPHIL # BLD AUTO: 0 K/UL (ref 0–0.5)
EOSINOPHIL NFR BLD: 0.4 % (ref 0–8)
ERYTHROCYTE [DISTWIDTH] IN BLOOD BY AUTOMATED COUNT: 16 % (ref 11.5–14.5)
EST. GFR  (NO RACE VARIABLE): >60 ML/MIN/1.73 M^2
GLUCOSE SERPL-MCNC: 135 MG/DL (ref 70–110)
GLUCOSE UR QL STRIP: NEGATIVE
HCT VFR BLD AUTO: 36.3 % (ref 37–48.5)
HGB BLD-MCNC: 11.5 G/DL (ref 12–16)
HGB UR QL STRIP: NEGATIVE
IMM GRANULOCYTES # BLD AUTO: 0.01 K/UL (ref 0–0.04)
IMM GRANULOCYTES NFR BLD AUTO: 0.1 % (ref 0–0.5)
KETONES UR QL STRIP: NEGATIVE
LEUKOCYTE ESTERASE UR QL STRIP: ABNORMAL
LYMPHOCYTES # BLD AUTO: 2.7 K/UL (ref 1–4.8)
LYMPHOCYTES NFR BLD: 35.2 % (ref 18–48)
MCH RBC QN AUTO: 26.6 PG (ref 27–31)
MCHC RBC AUTO-ENTMCNC: 31.7 G/DL (ref 32–36)
MCV RBC AUTO: 84 FL (ref 82–98)
MICROSCOPIC COMMENT: ABNORMAL
MONOCYTES # BLD AUTO: 0.7 K/UL (ref 0.3–1)
MONOCYTES NFR BLD: 9.1 % (ref 4–15)
NEUTROPHILS # BLD AUTO: 4.3 K/UL (ref 1.8–7.7)
NEUTROPHILS NFR BLD: 54.9 % (ref 38–73)
NITRITE UR QL STRIP: NEGATIVE
NRBC BLD-RTO: 0 /100 WBC
PH UR STRIP: 6 [PH] (ref 5–8)
PLATELET # BLD AUTO: 404 K/UL (ref 150–450)
PMV BLD AUTO: 10.3 FL (ref 9.2–12.9)
POTASSIUM SERPL-SCNC: 4 MMOL/L (ref 3.5–5.1)
PROT UR QL STRIP: NEGATIVE
RBC # BLD AUTO: 4.33 M/UL (ref 4–5.4)
SODIUM SERPL-SCNC: 140 MMOL/L (ref 136–145)
SP GR UR STRIP: >=1.03 (ref 1–1.03)
SQUAMOUS #/AREA URNS AUTO: 8 /HPF
URN SPEC COLLECT METH UR: ABNORMAL
UROBILINOGEN UR STRIP-ACNC: NEGATIVE EU/DL
WBC # BLD AUTO: 7.78 K/UL (ref 3.9–12.7)
WBC #/AREA URNS AUTO: 5 /HPF (ref 0–5)

## 2025-01-11 PROCEDURE — 99284 EMERGENCY DEPT VISIT MOD MDM: CPT | Mod: 25,ER

## 2025-01-11 PROCEDURE — 80048 BASIC METABOLIC PNL TOTAL CA: CPT | Mod: ER | Performed by: EMERGENCY MEDICINE

## 2025-01-11 PROCEDURE — 85025 COMPLETE CBC W/AUTO DIFF WBC: CPT | Mod: ER | Performed by: EMERGENCY MEDICINE

## 2025-01-11 PROCEDURE — 99900041 HC LEFT WITHOUT BEING SEEN- EMERGENCY: Mod: ER

## 2025-01-11 PROCEDURE — 81000 URINALYSIS NONAUTO W/SCOPE: CPT | Mod: ER | Performed by: EMERGENCY MEDICINE

## 2025-01-11 PROCEDURE — 81025 URINE PREGNANCY TEST: CPT | Mod: ER | Performed by: EMERGENCY MEDICINE

## 2025-01-11 NOTE — ED PROVIDER NOTES
Encounter Date: 2025       History   No chief complaint on file.    Patient presents with left flank pain for the past 3 weeks.  States it worsened today.  States she has been having a cough.  The cough exacerbates the pain.  The pain as sharp, exacerbated by movement.  Denies any fever or chills.  Denies any nausea/vomiting /diarrhea.  Admits to urinary frequency.    The history is provided by the patient.     Review of patient's allergies indicates:   Allergen Reactions    Penicillins Hives    Vancomycin analogues     Naproxen Itching and Rash     Past Medical History:   Diagnosis Date    Acanthosis nigricans 3/27/2014    Anemia     Anxiety     Miscarriage within last 12 months     Obesity      Past Surgical History:   Procedure Laterality Date     SECTION      x1    DILATION AND CURETTAGE OF UTERUS      x2    DILATION AND CURETTAGE OF UTERUS USING SUCTION N/A 3/2/2020    Procedure: DILATION AND CURETTAGE, UTERUS, USING SUCTION;  Surgeon: Sherlyn Jesus MD;  Location: [facility] OR;  Service: OB/GYN;  Laterality: N/A;    DILATION AND CURETTAGE OF UTERUS USING SUCTION N/A 2020    Procedure: DILATION AND CURETTAGE, UTERUS, USING SUCTION;  Surgeon: Violetta Pak MD;  Location: [facility] OR;  Service: OB/GYN;  Laterality: N/A;     No family history on file.  Social History     Tobacco Use    Smoking status: Never    Smokeless tobacco: Never   Substance Use Topics    Alcohol use: No    Drug use: No     Review of Systems   Genitourinary:  Positive for flank pain and frequency.       Physical Exam     Initial Vitals [25 0739]   BP Pulse Resp Temp SpO2   137/70 93 17 98.4 °F (36.9 °C) 100 %      MAP       --         Physical Exam    Vitals reviewed.  Constitutional: She appears well-developed. No distress.   Genitourinary:    Genitourinary Comments:   There is left-sided flank tenderness to palpation with no rigidity or distention       Neurological: She is alert and oriented to person, place, and  time.   Skin: Skin is warm and dry. No rash noted.         ED Course   Procedures  Labs Reviewed   CBC W/ AUTO DIFFERENTIAL - Abnormal       Result Value    WBC 7.78      RBC 4.33      Hemoglobin 11.5 (*)     Hematocrit 36.3 (*)     MCV 84      MCH 26.6 (*)     MCHC 31.7 (*)     RDW 16.0 (*)     Platelets 404      MPV 10.3      Immature Granulocytes 0.1      Gran # (ANC) 4.3      Immature Grans (Abs) 0.01      Lymph # 2.7      Mono # 0.7      Eos # 0.0      Baso # 0.02      nRBC 0      Gran % 54.9      Lymph % 35.2      Mono % 9.1      Eosinophil % 0.4      Basophil % 0.3      Differential Method Automated     BASIC METABOLIC PANEL - Abnormal    Sodium 140      Potassium 4.0      Chloride 106      CO2 22 (*)     Glucose 135 (*)     BUN 9      Creatinine 0.7      Calcium 8.9      Anion Gap 12      eGFR >60.0     URINALYSIS, REFLEX TO URINE CULTURE - Abnormal    Specimen UA Urine, Clean Catch      Color, UA Yellow      Appearance, UA Cloudy (*)     pH, UA 6.0      Specific Gravity, UA >=1.030 (*)     Protein, UA Negative      Glucose, UA Negative      Ketones, UA Negative      Bilirubin (UA) Negative      Occult Blood UA Negative      Nitrite, UA Negative      Urobilinogen, UA Negative      Leukocytes, UA Trace (*)    URINALYSIS MICROSCOPIC - Abnormal    WBC, UA 5      Bacteria Few (*)     Squam Epithel, UA 8      Microscopic Comment SEE COMMENT     PREGNANCY TEST, URINE RAPID    Preg Test, Ur Negative            Imaging Results              X-Ray Chest PA And Lateral (Final result)  Result time 01/11/25 09:12:07      Final result by Brijesh Soto MD (01/11/25 09:12:07)                   Impression:      1.  Negative for acute process involving the chest.    2.  Stable findings as noted above.      Electronically signed by: Brijesh Soto MD  Date:    01/11/2025  Time:    09:12               Narrative:    EXAMINATION:  XR CHEST PA AND LATERAL    CLINICAL HISTORY:  Pain, unspecified    COMPARISON:  Studies dating  back to November 19, 2021    FINDINGS:  The lungs are clear. The cardiac silhouette size is normal. The trachea is midline and the mediastinal width is normal. Negative for focal infiltrate, effusion or pneumothorax. Pulmonary vasculature is normal. Negative for osseous abnormalities. Convex left curvature of the upper thoracic spine with marginal spondylosis and cardiophrenic fat pads again seen.                                       Medications - No data to display  Medical Decision Making  Patient able to tolerate fluids at time of discharge.  Instructed to return immediately for any new or worsening symptoms and she verbalized understanding.    Amount and/or Complexity of Data Reviewed  Labs: ordered. Decision-making details documented in ED Course.  Radiology: ordered.    Risk  Risk Details: Differential diagnosis includes but is not limited to: UTI, musculoskeletal pain, gastroenteritis,               ED Course as of 01/12/25 0621   Sat Jan 11, 2025   0707 Basic Metabolic Panel(!)   Hyperglycemia with other nonspecific findings [CD]   0708 Pregnancy, urine rapid   negative [CD]   0708 CBC Auto Differential(!)   Normocytic anemia noted [CD]   0708 Urinalysis, Reflex to Urine Culture(!)   UTI [CD]   0708 Urinalysis Microscopic(!)   UTI [CD]      ED Course User Index  [CD] Tor Jaramillo DO                           Clinical Impression:  Final diagnoses:  [R10.9] Flank pain (Primary)  [N39.0, R31.9] Urinary tract infection with hematuria, site unspecified          ED Disposition Condition    Discharge           ED Prescriptions    None       Follow-up Information    None          Tor Jaramillo DO  01/12/25 0621

## 2025-01-21 NOTE — PROGRESS NOTES
Pharmacokinetic Follow Up: Gentamicin    Assessment of levels:   Trough level < 0.5mg/L ; continue current dose with next trough 3/6 at 03:30 assuming stable renal function.    Regimen Plan:   Continue current dose: Gentamicin 663.6mg mg IV every 24 hours    Drug levels (last 3 results):  No results for input(s): AMIKACINPEAK, AMIKACINTROU, AMIKACINRAND, AMIKACIN in the last 72 hours.    No results for input(s): GENTAMICIN, GENTPEAK, GENTTROUGH, GENT10, GENT12, GENT8, GENTRANDOM in the last 72 hours.    No results for input(s): TOBRA8, TOBRA10, TOBRA12, TOBRARND, TOBRAMYCIN, TOBRAPEAK, TOBRATROUGH, TOBRAMYCINPE, TOBRAMYCINRA, TOBRAMYCINTR in the last 72 hours.    Pharmacy will continue to monitor.    Please contact pharmacy at extension [phone] with any questions regarding this assessment.    Thank you for the consult,   Brijesh Maldonado      Patient brief summary:  Lise Qureshi is a 24 y.o. female initiated on aminoglycoside therapy for treatment of intra-abdominal infection    Drug Allergies:   Review of patient's allergies indicates:   Allergen Reactions    Penicillins Hives    Vancomycin analogues        Renal Function:   Estimated Creatinine Clearance: 187.1 mL/min (based on SCr of 0.7 mg/dL).,     Dialysis Method (if applicable):  N/A    CBC (last 72 hours):  Recent Labs   Lab Result Units 03/01/20  1600 03/02/20  0535   WBC K/uL 18.26* 11.19   Hemoglobin g/dL 8.5* 8.1*   Hematocrit % 27.2* 26.0*   Platelets K/uL 287 244   Gran% % 89.3* 89.1*   Lymph% % 4.3* 7.0*   Mono% % 6.0 2.8*   Eosinophil% % 0.0 0.0   Basophil% % 0.1 0.2   Differential Method  Automated Automated       Metabolic Panel (last 72 hours):  Recent Labs   Lab Result Units 03/01/20  1534 03/01/20  1600   Sodium mmol/L  --  139   Potassium mmol/L  --  3.5   Chloride mmol/L  --  105   CO2 mmol/L  --  23   Glucose mg/dL  --  98   Glucose, UA  Negative  --    BUN, Bld mg/dL  --  6   Creatinine mg/dL  --  0.7   Albumin g/dL  --  3.1*   Total  Bilirubin mg/dL  --  0.3   Alkaline Phosphatase U/L  --  71   AST U/L  --  21   ALT U/L  --  23       Aminoglycoside Administrations:  aminoglycosides given in last 96 hours                     gentamicin (GARAMYCIN) 663.6 mg in sodium chloride 0.9% 100 mL IVPB (mg) 663.6 mg New Bag 03/03/20 0411    gentamicin (GARAMYCIN) 660.8 mg in sodium chloride 0.9% 100 mL IVPB (mg) 660.8 mg New Bag 03/02/20 0321    gentamicin 80 mg/100ml NACL IVPB IVPB 80 mg (mg) 80 mg New Bag 03/01/20 1830                      Microbiologic Results:  Microbiology Results (last 7 days)       Procedure Component Value Units Date/Time    Blood culture x two cultures. Draw prior to antibiotics. [[id number]] Collected:  03/01/20 1600    Order Status:  Completed Specimen:  Blood from Peripheral, Forearm, Left Updated:  03/02/20 1115     Blood Culture, Routine No Growth to date    Narrative:       Aerobic and anaerobic    Blood culture x two cultures. Draw prior to antibiotics. [[id number]] Collected:  03/01/20 1616    Order Status:  Completed Specimen:  Blood from Peripheral, Antecubital, Left Updated:  03/02/20 1115     Blood Culture, Routine No Growth to date    Narrative:       Aerobic and anaerobic    Urine culture [[id number]] Collected:  03/01/20 1534    Order Status:  No result Specimen:  Urine Updated:  03/01/20 1552    Influenza A & B by Molecular [[id number]] Collected:  03/01/20 1501    Order Status:  Completed Specimen:  Nasopharyngeal Swab Updated:  03/01/20 1544     Influenza A, Molecular Negative     Influenza B, Molecular Negative     Flu A & B Source NP                 resolved   pulmonary help requested

## 2025-05-19 ENCOUNTER — HOSPITAL ENCOUNTER (EMERGENCY)
Facility: HOSPITAL | Age: 30
Discharge: HOME | End: 2025-05-19
Attending: EMERGENCY MEDICINE
Payer: MEDICAID

## 2025-05-19 VITALS
HEART RATE: 83 BPM | DIASTOLIC BLOOD PRESSURE: 68 MMHG | OXYGEN SATURATION: 100 % | SYSTOLIC BLOOD PRESSURE: 125 MMHG | WEIGHT: 293 LBS | RESPIRATION RATE: 18 BRPM | TEMPERATURE: 98 F | BODY MASS INDEX: 51.27 KG/M2

## 2025-05-19 DIAGNOSIS — M25.561 RIGHT KNEE PAIN: ICD-10-CM

## 2025-05-19 DIAGNOSIS — E66.01 MORBID OBESITY: ICD-10-CM

## 2025-05-19 DIAGNOSIS — I10 HYPERTENSION, UNSPECIFIED TYPE: ICD-10-CM

## 2025-05-19 DIAGNOSIS — R07.9 CHEST PAIN: ICD-10-CM

## 2025-05-19 DIAGNOSIS — M25.561 ARTHRALGIA OF RIGHT KNEE: ICD-10-CM

## 2025-05-19 DIAGNOSIS — R60.0 PEDAL EDEMA: Primary | ICD-10-CM

## 2025-05-19 LAB
ABSOLUTE EOSINOPHIL (OHS): 0.04 K/UL
ABSOLUTE MONOCYTE (OHS): 0.55 K/UL (ref 0.3–1)
ABSOLUTE NEUTROPHIL COUNT (OHS): 2.76 K/UL (ref 1.8–7.7)
ALBUMIN SERPL BCP-MCNC: 3.5 G/DL (ref 3.5–5.2)
ALP SERPL-CCNC: 85 UNIT/L (ref 40–150)
ALT SERPL W/O P-5'-P-CCNC: 18 UNIT/L (ref 10–44)
ANION GAP (OHS): 9 MMOL/L (ref 8–16)
AST SERPL-CCNC: 18 UNIT/L (ref 11–45)
BASOPHILS # BLD AUTO: 0.03 K/UL
BASOPHILS NFR BLD AUTO: 0.5 %
BILIRUB SERPL-MCNC: 0.4 MG/DL (ref 0.1–1)
BNP SERPL-MCNC: 19 PG/ML (ref 0–99)
BUN SERPL-MCNC: 9 MG/DL (ref 6–20)
CALCIUM SERPL-MCNC: 8.8 MG/DL (ref 8.7–10.5)
CHLORIDE SERPL-SCNC: 106 MMOL/L (ref 95–110)
CO2 SERPL-SCNC: 23 MMOL/L (ref 23–29)
CREAT SERPL-MCNC: 0.7 MG/DL (ref 0.5–1.4)
ERYTHROCYTE [DISTWIDTH] IN BLOOD BY AUTOMATED COUNT: 15.8 % (ref 11.5–14.5)
GFR SERPLBLD CREATININE-BSD FMLA CKD-EPI: >60 ML/MIN/1.73/M2
GLUCOSE SERPL-MCNC: 100 MG/DL (ref 70–110)
HCT VFR BLD AUTO: 36.3 % (ref 37–48.5)
HCV AB SERPL QL IA: NEGATIVE
HGB BLD-MCNC: 11.2 GM/DL (ref 12–16)
HIV 1+2 AB+HIV1 P24 AG SERPL QL IA: NEGATIVE
HOLD SPECIMEN: NORMAL
IMM GRANULOCYTES # BLD AUTO: 0.01 K/UL (ref 0–0.04)
IMM GRANULOCYTES NFR BLD AUTO: 0.2 % (ref 0–0.5)
LYMPHOCYTES # BLD AUTO: 2.35 K/UL (ref 1–4.8)
MCH RBC QN AUTO: 26 PG (ref 27–31)
MCHC RBC AUTO-ENTMCNC: 30.9 G/DL (ref 32–36)
MCV RBC AUTO: 84 FL (ref 82–98)
NUCLEATED RBC (/100WBC) (OHS): 0 /100 WBC
OHS QRS DURATION: 78 MS
OHS QTC CALCULATION: 467 MS
PLATELET # BLD AUTO: 348 K/UL (ref 150–450)
PMV BLD AUTO: 10 FL (ref 9.2–12.9)
POTASSIUM SERPL-SCNC: 3.8 MMOL/L (ref 3.5–5.1)
PROT SERPL-MCNC: 7.2 GM/DL (ref 6–8.4)
RBC # BLD AUTO: 4.31 M/UL (ref 4–5.4)
RELATIVE EOSINOPHIL (OHS): 0.7 %
RELATIVE LYMPHOCYTE (OHS): 40.9 % (ref 18–48)
RELATIVE MONOCYTE (OHS): 9.6 % (ref 4–15)
RELATIVE NEUTROPHIL (OHS): 48.1 % (ref 38–73)
SODIUM SERPL-SCNC: 138 MMOL/L (ref 136–145)
TROPONIN I SERPL DL<=0.01 NG/ML-MCNC: <0.006 NG/ML
WBC # BLD AUTO: 5.74 K/UL (ref 3.9–12.7)

## 2025-05-19 PROCEDURE — 99285 EMERGENCY DEPT VISIT HI MDM: CPT | Mod: 25

## 2025-05-19 PROCEDURE — 93010 ELECTROCARDIOGRAM REPORT: CPT | Mod: ,,, | Performed by: INTERNAL MEDICINE

## 2025-05-19 PROCEDURE — 83880 ASSAY OF NATRIURETIC PEPTIDE: CPT | Performed by: REGISTERED NURSE

## 2025-05-19 PROCEDURE — 84484 ASSAY OF TROPONIN QUANT: CPT | Performed by: REGISTERED NURSE

## 2025-05-19 PROCEDURE — 87389 HIV-1 AG W/HIV-1&-2 AB AG IA: CPT | Performed by: EMERGENCY MEDICINE

## 2025-05-19 PROCEDURE — 82040 ASSAY OF SERUM ALBUMIN: CPT | Performed by: REGISTERED NURSE

## 2025-05-19 PROCEDURE — 85025 COMPLETE CBC W/AUTO DIFF WBC: CPT | Performed by: REGISTERED NURSE

## 2025-05-19 PROCEDURE — 86803 HEPATITIS C AB TEST: CPT | Performed by: EMERGENCY MEDICINE

## 2025-05-19 PROCEDURE — 93005 ELECTROCARDIOGRAM TRACING: CPT

## 2025-05-19 RX ORDER — HYDROCHLOROTHIAZIDE 12.5 MG/1
12.5 TABLET ORAL DAILY
Qty: 30 TABLET | Refills: 0 | Status: SHIPPED | OUTPATIENT
Start: 2025-05-19 | End: 2026-05-19

## 2025-05-19 NOTE — FIRST PROVIDER EVALUATION
Medical screening examination initiated.  I have conducted a focused provider triage encounter, findings are as follows:    Brief history of present illness:  Bilateral lower extremity swelling and chest pain    There were no vitals filed for this visit.    Pertinent physical exam:  No acute distress, vital signs stable    Brief workup plan:  Workup    Preliminary workup initiated; this workup will be continued and followed by the physician or advanced practice provider that is assigned to the patient when roomed.

## 2025-05-19 NOTE — ED PROVIDER NOTES
"SCRIBE #1 NOTE: I, Scotty Fitzgerald, am scribing for, and in the presence of, Maida Mishra MD. I have scribed the entire note.       History     Chief Complaint   Patient presents with    Leg Swelling     Bilateral leg swelling started yesterday, also developed chest pain, denies shortness of breath. Hx of removed thyroid (cancer).      Review of patient's allergies indicates:   Allergen Reactions    Penicillins Hives    Vancomycin analogues     Naproxen Itching and Rash         History of Present Illness     HPI    2025, 10:55 AM  History obtained from the patient and medical records      History of Present Illness: Lise Qureshi is a 29 y.o. female patient with a PMHx of anemia, morbid obesity, thyroid cancer, and a SMHx of a thyroidectomy who presents to the Emergency Department for evaluation of bilateral leg and feet swelling which began yesterday. Associated sxs include leg tightness, leg pain described as "pins and needles," l and CP, no pleuritic pain. Pt also c/o a R knee "popping" sound that occurred a few days ago when she was ambulating and states her R knee has been "funny ever since." . Symptoms are constant and moderate in severity. No mitigating or exacerbating factors reported. Pt reports she been walking more lately due to her job. .  Patient denies any past history of HTN. Pt states she is complaint with her daily thyroid medication.No prior Tx specified.  No further complaints or concerns at this time.       Arrival mode: Personal Transportation    PCP: Vidhi Cardenas NP        Past Medical History:  Past Medical History:   Diagnosis Date    Acanthosis nigricans 3/27/2014    Anemia     Anxiety     Miscarriage within last 12 months     Obesity        Past Surgical History:  Past Surgical History:   Procedure Laterality Date     SECTION      x1    DILATION AND CURETTAGE OF UTERUS      x2    DILATION AND CURETTAGE OF UTERUS USING SUCTION N/A 3/2/2020    Procedure: DILATION " AND CURETTAGE, UTERUS, USING SUCTION;  Surgeon: Sherlyn Jesus MD;  Location: [facility] OR;  Service: OB/GYN;  Laterality: N/A;    DILATION AND CURETTAGE OF UTERUS USING SUCTION N/A 7/30/2020    Procedure: DILATION AND CURETTAGE, UTERUS, USING SUCTION;  Surgeon: Violetta Pak MD;  Location: [facility] OR;  Service: OB/GYN;  Laterality: N/A;         Family History:  No family history on file.    Social History:  Social History     Tobacco Use    Smoking status: Never    Smokeless tobacco: Never   Substance and Sexual Activity    Alcohol use: No    Drug use: No    Sexual activity: Yes     Partners: Male        Review of Systems     Review of Systems   Constitutional:  Negative for fever.   HENT:  Negative for sore throat.    Respiratory:  Negative for shortness of breath.    Cardiovascular:  Positive for chest pain and leg swelling (bilateral).   Gastrointestinal:  Negative for nausea.   Genitourinary:  Negative for dysuria.   Musculoskeletal:  Negative for back pain.        (+) bilateral leg pain  (+) bilateral feet swelling  (+) R knee pain   Skin:  Negative for rash.   Neurological:  Positive for numbness (bilateral legs). Negative for weakness.   Hematological:  Does not bruise/bleed easily.   All other systems reviewed and are negative.       Physical Exam     Initial Vitals [05/19/25 0911]   BP Pulse Resp Temp SpO2   (!) 139/95 94 20 97.7 °F (36.5 °C) 100 %      MAP       --          Physical Exam  Nursing Notes and Vital Signs Reviewed.  Constitutional: Patient is in no apparent distress. Pt is morbidly obese.  Head: Atraumatic. Normocephalic.  Eyes: PERRL. EOM intact. Conjunctivae are not pale. No scleral icterus.  ENT: Mucous membranes are moist. Oropharynx is clear and symmetric.    Neck: Supple. Full ROM. No lymphadenopathy.  Cardiovascular: Regular rate. Regular rhythm. No murmurs, rubs, or gallops. Distal pulses are 2+ and symmetric.  Pulmonary/Chest: No respiratory distress. Clear to auscultation  bilaterally. No wheezing or rales.  Abdominal: Soft and non-distended.  There is no tenderness.  No rebound, guarding, or rigidity. Good bowel sounds.  Genitourinary: No CVA tenderness.  Musculoskeletal: Moves all extremities. No obvious bony deformities. No edema to pt's legs or feet. No calf tenderness. No obvious swelling or tenderness to R knee body habitus limits exam. No overlying erythema or warmth.    Skin: Warm and dry.  Neurological:  Alert, awake, and appropriate.  Normal speech.  No acute focal neurological deficits are appreciated.  Psychiatric: Normal affect. Good eye contact. Appropriate in content.     ED Course   Procedures  ED Vital Signs:  Vitals:    05/19/25 0911 05/19/25 1100   BP: (!) 139/95 (!) 144/78   Pulse: 94 86   Resp: 20 18   Temp: 97.7 °F (36.5 °C)    TempSrc: Oral    SpO2: 100% 95%   Weight: (!) 157.5 kg (347 lb 3.2 oz)        Abnormal Lab Results:  Labs Reviewed   CBC WITH DIFFERENTIAL - Abnormal       Result Value    WBC 5.74      RBC 4.31      HGB 11.2 (*)     HCT 36.3 (*)     MCV 84      MCH 26.0 (*)     MCHC 30.9 (*)     RDW 15.8 (*)     Platelet Count 348      MPV 10.0      Nucleated RBC 0      Neut % 48.1      Lymph % 40.9      Mono % 9.6      Eos % 0.7      Basophil % 0.5      Imm Grans % 0.2      Neut # 2.76      Lymph # 2.35      Mono # 0.55      Eos # 0.04      Baso # 0.03      Imm Grans # 0.01     COMPREHENSIVE METABOLIC PANEL - Normal    Sodium 138      Potassium 3.8      Chloride 106      CO2 23      Glucose 100      BUN 9      Creatinine 0.7      Calcium 8.8      Protein Total 7.2      Albumin 3.5      Bilirubin Total 0.4      ALP 85      AST 18      ALT 18      Anion Gap 9      eGFR >60     TROPONIN I - Normal    Troponin-I <0.006     B-TYPE NATRIURETIC PEPTIDE - Normal    BNP 19     HEPATITIS C ANTIBODY - Normal    Hep C Ab Interp Negative     HIV 1 / 2 ANTIBODY - Normal    HIV 1/2 Ag/Ab Negative     CBC W/ AUTO DIFFERENTIAL    Narrative:     The following orders were  created for panel order CBC auto differential.  Procedure                               Abnormality         Status                     ---------                               -----------         ------                     CBC with Differential[[id number]]       Abnormal            Final result                 Please view results for these tests on the individual orders.   HEP C VIRUS HOLD SPECIMEN    Extra Tube Hold for add-ons.          All Lab Results:  Results for orders placed or performed during the hospital encounter of 05/19/25   EKG 12-lead    Collection Time: 05/19/25  9:13 AM   Result Value Ref Range    QRS Duration 78 ms    [facility] QTC Calculation 467 ms   Comprehensive metabolic panel    Collection Time: 05/19/25 10:52 AM   Result Value Ref Range    Sodium 138 136 - 145 mmol/L    Potassium 3.8 3.5 - 5.1 mmol/L    Chloride 106 95 - 110 mmol/L    CO2 23 23 - 29 mmol/L    Glucose 100 70 - 110 mg/dL    BUN 9 6 - 20 mg/dL    Creatinine 0.7 0.5 - 1.4 mg/dL    Calcium 8.8 8.7 - 10.5 mg/dL    Protein Total 7.2 6.0 - 8.4 gm/dL    Albumin 3.5 3.5 - 5.2 g/dL    Bilirubin Total 0.4 0.1 - 1.0 mg/dL    ALP 85 40 - 150 unit/L    AST 18 11 - 45 unit/L    ALT 18 10 - 44 unit/L    Anion Gap 9 8 - 16 mmol/L    eGFR >60 >60 mL/min/1.73/m2   Troponin I #1    Collection Time: 05/19/25 10:52 AM   Result Value Ref Range    Troponin-I <0.006 <=0.026 ng/mL   BNP    Collection Time: 05/19/25 10:52 AM   Result Value Ref Range    BNP 19 0 - 99 pg/mL   CBC with Differential    Collection Time: 05/19/25 10:52 AM   Result Value Ref Range    WBC 5.74 3.90 - 12.70 K/uL    RBC 4.31 4.00 - 5.40 M/uL    HGB 11.2 (L) 12.0 - 16.0 gm/dL    HCT 36.3 (L) 37.0 - 48.5 %    MCV 84 82 - 98 fL    MCH 26.0 (L) 27.0 - 31.0 pg    MCHC 30.9 (L) 32.0 - 36.0 g/dL    RDW 15.8 (H) 11.5 - 14.5 %    Platelet Count 348 150 - 450 K/uL    MPV 10.0 9.2 - 12.9 fL    Nucleated RBC 0 <=0 /100 WBC    Neut % 48.1 38 - 73 %    Lymph % 40.9 18 - 48 %    Mono % 9.6 4 - 15 %     Eos % 0.7 <=8 %    Basophil % 0.5 <=1.9 %    Imm Grans % 0.2 0.0 - 0.5 %    Neut # 2.76 1.8 - 7.7 K/uL    Lymph # 2.35 1 - 4.8 K/uL    Mono # 0.55 0.3 - 1 K/uL    Eos # 0.04 <=0.5 K/uL    Baso # 0.03 <=0.2 K/uL    Imm Grans # 0.01 0.00 - 0.04 K/uL   Hepatitis C Antibody    Collection Time: 05/19/25 10:52 AM   Result Value Ref Range    Hep C Ab Interp Negative Negative   HCV Virus Hold Specimen    Collection Time: 05/19/25 10:52 AM   Result Value Ref Range    Extra Tube Hold for add-ons.    HIV 1/2 Ag/Ab (4th Gen)    Collection Time: 05/19/25 10:52 AM   Result Value Ref Range    HIV 1/2 Ag/Ab Negative Negative       Imaging Results:  Imaging Results              X-Ray Knee Complete 4 Or More Views Right (Final result)  Result time 05/19/25 11:55:37      Final result by Ashutosh Angeles MD (05/19/25 11:55:37)                   Impression:      As above.      Electronically signed by: Ashutosh Angeles  Date:    05/19/2025  Time:    11:55               Narrative:    EXAMINATION:  XR KNEE COMP 4 OR MORE VIEWS RIGHT    CLINICAL HISTORY:  Pain in right knee    TECHNIQUE:  Four views right knee radiographs    COMPARISON:  None    FINDINGS:  No fracture.  No traumatic malalignment.  No osseous destructive process.  Soft tissue swelling.  No joint effusion.                                       X-Ray Chest AP Portable (Final result)  Result time 05/19/25 09:47:10      Final result by ANJUM Logan Sr., MD (05/19/25 09:47:10)                   Impression:      Normal study.      Electronically signed by: Romeo Logan MD  Date:    05/19/2025  Time:    09:47               Narrative:    EXAMINATION:  XR CHEST AP PORTABLE    CLINICAL HISTORY:  Chest Pain;    COMPARISON:  01/11/2025    FINDINGS:  The size of the heart is normal. The lungs are clear. There is no pneumothorax.  The costophrenic angles are sharp.                                       The EKG was ordered, reviewed, and independently interpreted by the ED  provider.  Interpretation time: 9:13  Rate: 90 BPM  Rhythm: normal sinus rhythm  Interpretation: No acute ST changes. No STEMI.         The Emergency Provider reviewed the vital signs and test results, which are outlined above.     ED Discussion     12:02 PM: Reassessed pt at this time. Discussed with patient and/or family/caretaker all pertinent ED information and results. Discussed pt dx and plan of tx. Gave the patient all f/u and return to the ED instructions. All questions and concerns were addressed at this time. Patient and/or family/caretaker expresses understanding of information and instructions, and is comfortable with plan to discharge. Pt is stable for discharge.     I discussed with patient and/or family/caretaker that evaluation in the ED does not suggest any emergent or life threatening medical conditions requiring immediate intervention beyond what was provided in the ED, and I believe patient is safe for discharge.  Regardless, an unremarkable evaluation in the ED does not preclude the development or presence of a serious of life threatening condition. As such, I instructed that the patient is to return immediately for any worsening or change in current symptoms.       Medical Decision Making  DDX: 1. Pedal Edema 2. Obesity 3. ACS 4. CHF    ECG NSR no acute ischemic changes, CXR no acute findings, xray right shows mild soft tissue swelling, lab work otherwise normal, no obvious swelling on exam, no concerns for ACS, no concerns for PE, overall likely related to body habitus and or standing too long.     Amount and/or Complexity of Data Reviewed  Labs: ordered. Decision-making details documented in ED Course.  Radiology: ordered. Decision-making details documented in ED Course.  ECG/medicine tests: ordered and independent interpretation performed. Decision-making details documented in ED Course.    Risk  Prescription drug management.                ED Medication(s):  Medications - No data to  display    New Prescriptions    HYDROCHLOROTHIAZIDE 12.5 MG TAB    Take 1 tablet (12.5 mg total) by mouth once daily.        Follow-up Information       Vidhi Cardenas, CHRIS. Schedule an appointment as soon as possible for a visit in 2 days.    Specialty: Family Medicine  Why: Return to the Emergency Room, If symptoms worsen  Contact information:  [address] [facility] [address]  [phone]                                 Scribe Attestation:   Scribe #1: I performed the above scribed service and the documentation accurately describes the services I performed. I attest to the accuracy of the note.     Attending:   Physician Attestation Statement for Scribe #1: I, Maida Mishra MD, personally performed the services described in this documentation, as scribed by Scotty Fitzgerald, in my presence, and it is both accurate and complete.           Clinical Impression       ICD-10-CM ICD-9-CM   1. Pedal edema  R60.0 782.3   2. Chest pain  R07.9 786.50   3. Right knee pain  M25.561 719.46   4. Arthralgia of right knee  M25.561 719.46   5. Hypertension, unspecified type  I10 401.9   6. Morbid obesity  E66.01 278.01       Disposition:   Disposition: Discharged  Condition: Stable        Maida Mishra MD  05/26/25 7241

## 2025-06-15 ENCOUNTER — HOSPITAL ENCOUNTER (EMERGENCY)
Facility: HOSPITAL | Age: 30
Discharge: HOME | End: 2025-06-15
Attending: EMERGENCY MEDICINE
Payer: MEDICAID

## 2025-06-15 VITALS
HEART RATE: 90 BPM | HEIGHT: 69 IN | RESPIRATION RATE: 18 BRPM | DIASTOLIC BLOOD PRESSURE: 98 MMHG | WEIGHT: 293 LBS | TEMPERATURE: 99 F | BODY MASS INDEX: 43.4 KG/M2 | SYSTOLIC BLOOD PRESSURE: 180 MMHG | OXYGEN SATURATION: 99 %

## 2025-06-15 DIAGNOSIS — L91.8 SKIN TAG: Primary | ICD-10-CM

## 2025-06-15 PROCEDURE — 99284 EMERGENCY DEPT VISIT MOD MDM: CPT | Mod: 25

## 2025-06-15 PROCEDURE — 63600175 PHARM REV CODE 636 W HCPCS: Mod: JZ,TB

## 2025-06-15 PROCEDURE — 96372 THER/PROPH/DIAG INJ SC/IM: CPT

## 2025-06-15 RX ORDER — KETOROLAC TROMETHAMINE 10 MG/1
10 TABLET, FILM COATED ORAL EVERY 6 HOURS
Qty: 20 TABLET | Refills: 0 | Status: SHIPPED | OUTPATIENT
Start: 2025-06-15 | End: 2025-06-20

## 2025-06-15 RX ORDER — KETOROLAC TROMETHAMINE 30 MG/ML
30 INJECTION, SOLUTION INTRAMUSCULAR; INTRAVENOUS
Status: COMPLETED | OUTPATIENT
Start: 2025-06-15 | End: 2025-06-15

## 2025-06-15 RX ADMIN — KETOROLAC TROMETHAMINE 30 MG: 30 INJECTION, SOLUTION INTRAMUSCULAR; INTRAVENOUS at 09:06

## 2025-06-15 NOTE — Clinical Note
"Lise"INGRIS Qureshi was seen and treated in our emergency department on 6/15/2025.  She may return to work on 06/16/2025.       If you have any questions or concerns, please don't hesitate to call.      Chioma Vaca PA-C"

## 2025-06-16 NOTE — ED PROVIDER NOTES
Encounter Date: 6/15/2025       History     Chief Complaint   Patient presents with    General Illness     Pt reports skin tag under her right arm and a knot     29-year-old female with a PMHx significant of anemia, acanthosis nigricans, obesity, and anxiety presenting to the emergency department with complaints of skin tag under the right armpit.  Patient reports that she has beginning to have darkening of the skin around it as well as induration.  Patient reports that skin tag is in the line of her bra strap; she has been getting to have increasing pain secondary to close/bra strap rubbing of the skin intact.  Pain is intermittent moderate in severity.  Patient reports her pain is 9/10 on a pain scale.  She has not had any prior treatment; has never been evaluated for this before.  Denies fever, chills, spontaneous drainage, purulence, chest pain, palpitations, lightheadedness, dizziness, and all other symptoms at this time.    The history is provided by the patient.     Review of patient's allergies indicates:   Allergen Reactions    Penicillins Hives    Vancomycin analogues     Naproxen Itching and Rash     Past Medical History:   Diagnosis Date    Acanthosis nigricans 3/27/2014    Anemia     Anxiety     Miscarriage within last 12 months     Obesity      Past Surgical History:   Procedure Laterality Date     SECTION      x1    DILATION AND CURETTAGE OF UTERUS      x2    DILATION AND CURETTAGE OF UTERUS USING SUCTION N/A 3/2/2020    Procedure: DILATION AND CURETTAGE, UTERUS, USING SUCTION;  Surgeon: Sherlyn Jesus MD;  Location: [facility] OR;  Service: OB/GYN;  Laterality: N/A;    DILATION AND CURETTAGE OF UTERUS USING SUCTION N/A 2020    Procedure: DILATION AND CURETTAGE, UTERUS, USING SUCTION;  Surgeon: Violetta Pak MD;  Location: [facility] OR;  Service: OB/GYN;  Laterality: N/A;     No family history on file.  Social History[1]  Review of Systems   Constitutional:  Negative for fever.   HENT:   Negative for sore throat.    Respiratory:  Negative for shortness of breath.    Cardiovascular:  Negative for chest pain.   Gastrointestinal:  Negative for nausea.   Genitourinary:  Negative for dysuria.   Musculoskeletal:  Negative for back pain.   Skin:  Negative for rash.        +skin tag   Neurological:  Negative for weakness.   Hematological:  Does not bruise/bleed easily.   All other systems reviewed and are negative.      Physical Exam     Initial Vitals [06/15/25 2146]   BP Pulse Resp Temp SpO2   (!) 180/98 90 18 98.6 °F (37 °C) 99 %      MAP       --         Physical Exam    Nursing note reviewed.  Constitutional: She appears well-developed and well-nourished.  Non-toxic appearance. She does not have a sickly appearance. She does not appear ill. No distress.   HENT:   Head: Normocephalic and atraumatic.   Right Ear: Hearing normal.   Left Ear: Hearing normal.   Nose: Nose normal. Mouth/Throat: Uvula is midline and oropharynx is clear and moist.   Eyes: Conjunctivae, EOM and lids are normal. Pupils are equal, round, and reactive to light.   Neck: Trachea normal. Neck supple.   Normal range of motion.   Full passive range of motion without pain.     Cardiovascular:  Normal rate, regular rhythm, normal heart sounds, intact distal pulses and normal pulses.           Pulmonary/Chest: Effort normal and breath sounds normal.   Abdominal: Abdomen is soft. Bowel sounds are normal. There is no abdominal tenderness. There is no rebound and no guarding.   Musculoskeletal:         General: Normal range of motion.      Cervical back: Normal, full passive range of motion without pain, normal range of motion and neck supple.     Neurological: She is alert and oriented to person, place, and time. She has normal strength and normal reflexes. No cranial nerve deficit or sensory deficit. GCS eye subscore is 4. GCS verbal subscore is 5. GCS motor subscore is 6.   Skin: Skin is warm and dry.   0.5 cm skin tag noted to the right  armpit; may have some induration around it.  There is no surrounding redness, fluctuance, or signs of infection.   Psychiatric: She has a normal mood and affect. Her behavior is normal. Thought content normal.         ED Course   Procedures  Labs Reviewed - No data to display       Imaging Results    None          Medications   ketorolac injection 30 mg (has no administration in time range)     Medical Decision Making  Risk  Prescription drug management.  Risk Details: 29-year-old female presenting to the emergency department with complaints of a irritated skin tag under her right armpit; skin tag is in the way of her bra strap and gets increasingly irritated whenever her clothes rub on it.  Discussed with the patient that I do not recommend removal of any lesions in the emergency department as there is not good follow up in the emergency department.  Recommended that she follows up with either her primary care provider or Dermatology; therefore, Dermatology referral has been were placed at this time.  There are no signs of infection, therefore no antibiotics were ordered; and there is no sign of the emergency at this time.  Patient verbalizes understanding and is comfortable with discharge.  Her pain will be managed with Toradol; patient reports tolerance with Toradol despite naproxen allergy.  Patient is stable for discharge from the emergency department.    I discussed with patient that evaluation in the ED does not suggest any emergent or life threatening medical conditions requiring immediate intervention beyond what was provided in the ED, and I believe patient is safe for discharge. Regardless, an unremarkable evaluation in the ED does not preclude the development or presence of a serious of life threatening condition. As such, patient was instructed to return immediately for any worsening or change in current symptoms.                                       Clinical Impression:  Final diagnoses:  [L91.8] Skin  tag (Primary)          ED Disposition Condition    Discharge Stable          ED Prescriptions       Medication Sig Dispense Start Date End Date Auth. Provider    ketorolac (TORADOL) 10 mg tablet Take 1 tablet (10 mg total) by mouth every 6 (six) hours. for 5 days 20 tablet 6/15/2025 6/20/2025 Chioma Vaca PA-C          Follow-up Information       Follow up With Specialties Details Why Contact Info    [facility] - Emergency Dept. Emergency Medicine  If symptoms worsen [address] [facility] [id number] [phone]    Vidhi Cardenas NP Family Medicine In 2 days  [address] [facility] [address] [phone]                     [1]   Social History  Tobacco Use    Smoking status: Never    Smokeless tobacco: Never   Substance Use Topics    Alcohol use: No    Drug use: No        Chioma Vaca PA-C  06/15/25 0970

## (undated) DEVICE — SEE L#152161

## (undated) DEVICE — UNDERGLOVES BIOGEL PI SZ 7 LF

## (undated) DEVICE — TUBING COLLECTION PVC D AND C

## (undated) DEVICE — SYR 10CC LUER LOCK

## (undated) DEVICE — SEE MEDLINE ITEM 154981

## (undated) DEVICE — CATH URETHRAL 16FR RED

## (undated) DEVICE — Device

## (undated) DEVICE — GLOVE SURG BIOGEL LATEX SZ 7.5

## (undated) DEVICE — CONTAINER SPECIMEN STRL 4OZ

## (undated) DEVICE — GLOVE SURGICAL LATEX SZ 7

## (undated) DEVICE — SEE MEDLINE ITEM 152622

## (undated) DEVICE — DRESSING TELFA N ADH 3X8

## (undated) DEVICE — MANIFOLD 4 PORT

## (undated) DEVICE — GLOVE SURGICAL LATEX SZ 6

## (undated) DEVICE — SEE MEDLINE ITEM 157181

## (undated) DEVICE — COVER OVERHEAD SURG LT BLUE

## (undated) DEVICE — PAD PERI POST REPLACEMNT

## (undated) DEVICE — SEE MEDLINE ITEM 157027

## (undated) DEVICE — PACK DRAPE PERI/GYN TIBURON

## (undated) DEVICE — TRAP TISSUE COLLECTION BERKLE

## (undated) DEVICE — SOL NS 1000CC

## (undated) DEVICE — SEE MEDLINE ITEM 152739

## (undated) DEVICE — CATH 16FR URETHRL RED RUB

## (undated) DEVICE — UNDERGLOVES BIOGEL PI SZ 6 LF